# Patient Record
Sex: MALE | Race: WHITE | HISPANIC OR LATINO | Employment: PART TIME | URBAN - METROPOLITAN AREA
[De-identification: names, ages, dates, MRNs, and addresses within clinical notes are randomized per-mention and may not be internally consistent; named-entity substitution may affect disease eponyms.]

---

## 2017-10-11 ENCOUNTER — ALLSCRIPTS OFFICE VISIT (OUTPATIENT)
Dept: OTHER | Facility: OTHER | Age: 37
End: 2017-10-11

## 2017-10-11 ENCOUNTER — APPOINTMENT (OUTPATIENT)
Dept: LAB | Facility: CLINIC | Age: 37
End: 2017-10-11

## 2017-10-11 DIAGNOSIS — R10.13 EPIGASTRIC PAIN: ICD-10-CM

## 2017-10-11 DIAGNOSIS — R80.9 PROTEINURIA: ICD-10-CM

## 2017-10-11 DIAGNOSIS — E66.3 OVERWEIGHT: ICD-10-CM

## 2017-10-11 DIAGNOSIS — R39.9 UNSPECIFIED SYMPTOMS AND SIGNS INVOLVING THE GENITOURINARY SYSTEM: ICD-10-CM

## 2017-10-11 DIAGNOSIS — Z13.220 ENCOUNTER FOR SCREENING FOR LIPOID DISORDERS: ICD-10-CM

## 2017-10-11 DIAGNOSIS — R73.01 IMPAIRED FASTING GLUCOSE: ICD-10-CM

## 2017-10-11 LAB
ALBUMIN SERPL BCP-MCNC: 4.1 G/DL (ref 3.5–5)
ALP SERPL-CCNC: 101 U/L (ref 46–116)
ALT SERPL W P-5'-P-CCNC: 74 U/L (ref 12–78)
ANION GAP SERPL CALCULATED.3IONS-SCNC: 9 MMOL/L (ref 4–13)
AST SERPL W P-5'-P-CCNC: 42 U/L (ref 5–45)
BACTERIA UR QL AUTO: NORMAL /HPF
BILIRUB SERPL-MCNC: 0.94 MG/DL (ref 0.2–1)
BILIRUB UR QL STRIP: NEGATIVE
BUN SERPL-MCNC: 13 MG/DL (ref 5–25)
CALCIUM SERPL-MCNC: 9.1 MG/DL (ref 8.3–10.1)
CHLORIDE SERPL-SCNC: 106 MMOL/L (ref 100–108)
CHOLEST SERPL-MCNC: 157 MG/DL (ref 50–200)
CLARITY UR: ABNORMAL
CO2 SERPL-SCNC: 23 MMOL/L (ref 21–32)
COLOR UR: YELLOW
CREAT SERPL-MCNC: 0.73 MG/DL (ref 0.6–1.3)
GFR SERPL CREATININE-BSD FRML MDRD: 118 ML/MIN/1.73SQ M
GLUCOSE P FAST SERPL-MCNC: 111 MG/DL (ref 65–99)
GLUCOSE UR STRIP-MCNC: NEGATIVE MG/DL
HDLC SERPL-MCNC: 40 MG/DL (ref 40–60)
HGB UR QL STRIP.AUTO: NEGATIVE
HYALINE CASTS #/AREA URNS LPF: NORMAL /LPF
KETONES UR STRIP-MCNC: NEGATIVE MG/DL
LDLC SERPL CALC-MCNC: 88 MG/DL (ref 0–100)
LEUKOCYTE ESTERASE UR QL STRIP: NEGATIVE
NITRITE UR QL STRIP: NEGATIVE
NON-SQ EPI CELLS URNS QL MICRO: NORMAL /HPF
PH UR STRIP.AUTO: 6 [PH] (ref 4.5–8)
POTASSIUM SERPL-SCNC: 3.5 MMOL/L (ref 3.5–5.3)
PROT SERPL-MCNC: 8.8 G/DL (ref 6.4–8.2)
PROT UR STRIP-MCNC: ABNORMAL MG/DL
RBC #/AREA URNS AUTO: NORMAL /HPF
SODIUM SERPL-SCNC: 138 MMOL/L (ref 136–145)
SP GR UR STRIP.AUTO: 1.03 (ref 1–1.03)
T4 FREE SERPL-MCNC: 1.1 NG/DL (ref 0.76–1.46)
TRIGL SERPL-MCNC: 146 MG/DL
TSH SERPL DL<=0.05 MIU/L-ACNC: 4.5 UIU/ML (ref 0.36–3.74)
UROBILINOGEN UR QL STRIP.AUTO: 0.2 E.U./DL
WBC #/AREA URNS AUTO: NORMAL /HPF

## 2017-10-11 PROCEDURE — 80053 COMPREHEN METABOLIC PANEL: CPT

## 2017-10-11 PROCEDURE — 81001 URINALYSIS AUTO W/SCOPE: CPT

## 2017-10-11 PROCEDURE — 80061 LIPID PANEL: CPT

## 2017-10-11 PROCEDURE — 84439 ASSAY OF FREE THYROXINE: CPT

## 2017-10-11 PROCEDURE — 36415 COLL VENOUS BLD VENIPUNCTURE: CPT

## 2017-10-11 PROCEDURE — 84443 ASSAY THYROID STIM HORMONE: CPT

## 2017-10-13 NOTE — PROGRESS NOTES
Assessment  1  Pterygium of left eye (372 40) (H11 002)   2  Dyspepsia (536 8) (R10 13)   3  Symptoms involving urinary system (788 99) (R39 9)   4  Overweight (278 02) (E66 3)   5  Screening for lipoid disorders (V77 91) (Z13 220)   6  Acute bilateral low back pain without sciatica (724 2,338 19) (M54 5)   7  Decreased vision (369 9) (H54 7)    Plan   Dyspepsia    · RaNITidine HCl - 150 MG Oral Tablet; Take 1 tablet twice daily   · (1) COMPREHENSIVE METABOLIC PANEL; Status:Active; Requested for:11Oct2017; Overweight    · (1) TSH WITH FT4 REFLEX; Status:Active; Requested for:11Oct2017;   Pterygium of left eye    · Ophthalmology Referral Other Co-Management  *  Status: Hold For - Scheduling   Requested for: [id number]  are Referring to a non- Preferred Provider : Provider not listed in Allscripts  Care Summary provided  : Yes  Screening for lipoid disorders    · (1) LIPID PANEL FASTING W DIRECT LDL REFLEX; Status:Active; Requested  for:11Oct2017;   Symptoms involving urinary system    · (1) URINALYSIS w URINE C/S REFLEX (will reflex a microscopy if leukocytes, occult  blood, or nitrites are not within normal limits); Status:Active; Requested for:11Oct2017;     Flulaval Quadrivalent Intramuscular Suspension; INJECT 0 5  ML Intramuscular; To Be Done: [id number]; Status: Hold For - Administration, Retrospective By Protocol Authorization;  Ordered  For: Need for influenza vaccination; Ordered By:Leon Davenport; Effective Date:11Oct2017; Last Updated By: Grisel Garcia; 10/11/2017 11:50:39 AM     Discussion/Summary  Discussion Summary:   *Low back paintylenol prn, 650 mg PO  No NSAIDS (motrin/ibuprofen) until we r/o kidney disease  taking ranitidine 1-2 tabs PO BIDsymptoms improve   consider taking it 1 tablet daily at bedtimeto continue losing weight  visionto make appt with ophthalmologist  Counseling Documentation With Imm: The patient was counseled regarding instructions for [facility] factor reductions,-risks and benefits of treatment options,-importance of compliance with treatment  Patient Education: Educational resources provided:   Medication SE Review and Pt Understands Tx: Possible side effects of new medications were reviewed with the patient/guardian today  The treatment plan was reviewed with the patient/guardian  The patient/guardian understands and agrees with the treatment plan      Chief Complaint  Chief Complaint Free Text Note Form: Presents to the clinic to establish care      History of Present Illness  HPI: as aboveurinary symptoms including frequency, and discolored urine more like orange color  x 1 month  No dysuria, no hematuria  Occurred in the past about 5 months ago  Mother has a Hx of kidney disease and DM,  from complication from diabetes and kidney failure  [facility] Based Practices Required Assessment:   Pain Assessment   the patient states they have pain  The pain is located in the parveen back, epigastric  The patient describes the pain as aching and burning  (on a scale of 0 to 10, the patient rates the pain at 8 )       Review of Systems  Complete-Male:   Constitutional: No fever or chills, feels well, no tiredness, no recent weight gain or weight loss  Eyes: eyesight problems-and-decreased vision, more on the left side, but-as noted in HPI,-no eye pain,-eyes not red-and-no purulent discharge from the eyes  ENT: no complaints of earache, no hearing loss, no nosebleeds, no nasal discharge, no sore throat, no hoarseness  Cardiovascular: No complaints of slow heart rate, no fast heart rate, no chest pain, no palpitations, no leg claudication, no lower extremity  Respiratory: No complaints of shortness of breath, no wheezing, no cough, no SOB on exertion, no orthopnea or PND  Gastrointestinal: as noted in HPI,-no abdominal pain,-no nausea,-no vomiting,-no constipation,-no diarrhea-and-no blood in stools     Musculoskeletal: myalgias-and-low back pain, B/L, but-as noted in HPI,-no arthralgias,-no joint swelling-and-no joint stiffness  Integumentary: No complaints of skin rash or skin lesions, no itching, no skin wound, no dry skin  Psychiatric: Is not suicidal, no sleep disturbances, no anxiety or depression, no change in personality, no emotional problems  ROS Reviewed:   ROS reviewed  Active Problems  1  Pterygium of left eye (372 40) (H11 002)    Past Medical History  1  History of cardiomegaly (V12 59) (Z86 79)  Active Problems And Past Medical History Reviewed: The active problems and past medical history were reviewed and updated today  Surgical History  1  History of Eye Surgery  Surgical History Reviewed: The surgical history was reviewed and updated today  Family History  Mother    1  Family history of diabetes mellitus (V18 0) (Z83 3)   2  Family history of kidney disease (V18 69) (Z84 1)   3  Family history of Uterine cyst  Father    4  Family history unknown (V49 89) (Z78 9)  Family History    5  Denied: Family history of drug abuse   6  No family history of mental disorder  Family History Reviewed: The family history was reviewed and updated today  Social History   · Never a smoker  Social History Reviewed: The social history was reviewed and updated today  Current Meds   1  No Reported Medications Recorded    Allergies  1  No Known Drug Allergies    Vitals  Signs   Recorded: 50MYC6739 08:10AM   Temperature: 97 8 F  Heart Rate: 72  Systolic: 191  Diastolic: 82  Height: 5 ft 5 75 in  Weight: 183 lb   BMI Calculated: 29 76  BSA Calculated: 1 92    Physical Exam    Constitutional   General appearance: Abnormal   well developed,-appears healthy,-comfortable,-normal body odor,-does not smell of feces,-does not smell of urine,-well nourished,-overweight,-clothing appropriate-and-well groomed  Head and Face   Head and face: Normal     Palpation of the face and sinuses: No sinus tenderness      Eyes   Conjunctiva and lids: No erythema, swelling or discharge  Pupils and irises: Equal, round, reactive to light  Ears, Nose, Mouth, and Throat   External inspection of ears and nose: Normal     Otoscopic examination: Tympanic membranes translucent with normal light reflex  Canals patent without erythema  Nasal mucosa, septum, and turbinates: Normal without edema or erythema  Lips, teeth, and gums: Abnormal  -dental caries, some missing teeth  Oropharynx: Normal with no erythema, edema, exudate or lesions  Neck   Neck: Supple, symmetric, trachea midline, no masses  Thyroid: Normal, no thyromegaly  Pulmonary   Respiratory effort: No increased work of breathing or signs of respiratory distress  Auscultation of lungs: Clear to auscultation  Cardiovascular   Auscultation of heart: Normal rate and rhythm, normal S1 and S2, no murmurs  Pedal pulses: 2+ bilaterally  Abdomen   Abdomen: Abnormal  -mild tenderness on the epigatric area  Liver and spleen: No hepatomegaly or splenomegaly  Musculoskeletal   Gait and station: Normal     Inspection/palpation of joints, bones, and muscles: Normal     Range of motion: Normal     Muscle strength/tone: Normal     Psychiatric   Judgment and insight: Normal     Orientation to person, place and time: Normal     Recent and remote memory: Intact  Mood and affect: Normal        Results/Data  PHQ-2 Adult Depression Screening 11Oct2017 08:44AM User, Ahs     Test Name Result Flag Reference   PHQ-2 Adult Depression Score 0     Over the last two weeks, how often have you been bothered by any of the following problems? Little interest or pleasure in doing things: Not at all - 0  Feeling down, depressed, or hopeless: Not at all - 0   PHQ-2 Adult Depression Screening Negative         Attending Note  Collaborating Physician Note: Collaborating Physician: I supervised the Advanced Practitioner-and-I agree with the Advanced Practitioner note        Future Appointments    Date/Time Provider Specialty Site   10/18/2017 08:00 AM Lois Boyle 6 PCP     Signatures   Electronically signed by : JUVENTINO Shrestha; Oct 11 2017 11:47AM EST                       (Author)    Electronically signed by : Kaleb Rosa DO; Oct 11 2017 12:06PM EST                       (Review)

## 2017-10-18 ENCOUNTER — GENERIC CONVERSION - ENCOUNTER (OUTPATIENT)
Dept: OTHER | Facility: OTHER | Age: 37
End: 2017-10-18

## 2017-10-18 ENCOUNTER — APPOINTMENT (OUTPATIENT)
Dept: LAB | Facility: CLINIC | Age: 37
End: 2017-10-18

## 2017-10-18 DIAGNOSIS — R80.9 PROTEINURIA: ICD-10-CM

## 2017-10-18 DIAGNOSIS — R73.01 IMPAIRED FASTING GLUCOSE: ICD-10-CM

## 2017-10-18 LAB
EST. AVERAGE GLUCOSE BLD GHB EST-MCNC: 128 MG/DL
HBA1C MFR BLD: 6.1 % (ref 4.2–6.3)

## 2017-10-18 PROCEDURE — 86705 HEP B CORE ANTIBODY IGM: CPT

## 2017-10-18 PROCEDURE — 86038 ANTINUCLEAR ANTIBODIES: CPT

## 2017-10-18 PROCEDURE — 87340 HEPATITIS B SURFACE AG IA: CPT

## 2017-10-18 PROCEDURE — 83036 HEMOGLOBIN GLYCOSYLATED A1C: CPT

## 2017-10-18 PROCEDURE — 86704 HEP B CORE ANTIBODY TOTAL: CPT

## 2017-10-18 PROCEDURE — 86803 HEPATITIS C AB TEST: CPT

## 2017-10-18 PROCEDURE — 36415 COLL VENOUS BLD VENIPUNCTURE: CPT

## 2017-10-18 PROCEDURE — 87389 HIV-1 AG W/HIV-1&-2 AB AG IA: CPT

## 2017-10-19 LAB
HBV CORE AB SER QL: NORMAL
HBV CORE IGM SER QL: NORMAL
HBV SURFACE AG SER QL: NORMAL
HCV AB SER QL: NORMAL

## 2017-10-20 LAB
HIV 1+2 AB+HIV1 P24 AG SERPL QL IA: NORMAL
RYE IGE QN: NEGATIVE

## 2017-10-25 ENCOUNTER — HOSPITAL ENCOUNTER (OUTPATIENT)
Dept: RADIOLOGY | Facility: HOSPITAL | Age: 37
Discharge: HOME | End: 2017-10-25

## 2017-10-25 ENCOUNTER — TRANSCRIBE ORDERS (OUTPATIENT)
Dept: RADIOLOGY | Facility: HOSPITAL | Age: 37
End: 2017-10-25

## 2017-10-25 DIAGNOSIS — R80.9 PROTEINURIA: ICD-10-CM

## 2017-10-25 PROCEDURE — 74000 HB X-RAY EXAM OF ABDOMEN (SINGLE ANTEROPOSTERIOR VIEW): CPT

## 2017-11-01 ENCOUNTER — ALLSCRIPTS OFFICE VISIT (OUTPATIENT)
Dept: OTHER | Facility: OTHER | Age: 37
End: 2017-11-01

## 2017-11-01 LAB
BILIRUB UR QL STRIP: NEGATIVE
CLARITY UR: NORMAL
COLOR UR: YELLOW
GLUCOSE (HISTORICAL): NEGATIVE
HGB UR QL STRIP.AUTO: NORMAL
KETONES UR STRIP-MCNC: NEGATIVE MG/DL
LEUKOCYTE ESTERASE UR QL STRIP: NEGATIVE
NITRITE UR QL STRIP: NEGATIVE
PH UR STRIP.AUTO: 5 [PH]
PROT UR STRIP-MCNC: NORMAL MG/DL
SP GR UR STRIP.AUTO: 1.03
UROBILINOGEN UR QL STRIP.AUTO: 0.2

## 2017-11-08 ENCOUNTER — APPOINTMENT (OUTPATIENT)
Dept: LAB | Facility: CLINIC | Age: 37
End: 2017-11-08

## 2017-11-08 DIAGNOSIS — R80.9 PROTEINURIA: ICD-10-CM

## 2017-11-08 LAB
CREAT UR-MCNC: 285 MG/DL
MICROALBUMIN UR-MCNC: 311 MG/L (ref 0–20)
MICROALBUMIN/CREAT 24H UR: 109 MG/G CREATININE (ref 0–30)

## 2017-11-08 PROCEDURE — 82043 UR ALBUMIN QUANTITATIVE: CPT

## 2017-11-08 PROCEDURE — 82570 ASSAY OF URINE CREATININE: CPT

## 2018-01-13 VITALS
DIASTOLIC BLOOD PRESSURE: 82 MMHG | TEMPERATURE: 97.8 F | BODY MASS INDEX: 29.41 KG/M2 | HEIGHT: 66 IN | HEART RATE: 72 BPM | WEIGHT: 183 LBS | SYSTOLIC BLOOD PRESSURE: 118 MMHG

## 2018-01-22 VITALS
TEMPERATURE: 98 F | SYSTOLIC BLOOD PRESSURE: 122 MMHG | DIASTOLIC BLOOD PRESSURE: 84 MMHG | HEIGHT: 65 IN | HEART RATE: 62 BPM | BODY MASS INDEX: 31.66 KG/M2 | WEIGHT: 190.04 LBS

## 2018-01-22 VITALS
HEART RATE: 60 BPM | SYSTOLIC BLOOD PRESSURE: 96 MMHG | HEIGHT: 65 IN | BODY MASS INDEX: 31.44 KG/M2 | WEIGHT: 188.71 LBS | DIASTOLIC BLOOD PRESSURE: 80 MMHG | TEMPERATURE: 97.4 F

## 2018-02-27 RX ORDER — RANITIDINE 150 MG/1
1 TABLET ORAL 2 TIMES DAILY
COMMUNITY
Start: 2017-10-11 | End: 2018-02-28 | Stop reason: SDUPTHER

## 2018-02-28 ENCOUNTER — OFFICE VISIT (OUTPATIENT)
Dept: INTERNAL MEDICINE CLINIC | Facility: CLINIC | Age: 38
End: 2018-02-28

## 2018-02-28 VITALS
TEMPERATURE: 98.2 F | BODY MASS INDEX: 31.81 KG/M2 | HEART RATE: 74 BPM | DIASTOLIC BLOOD PRESSURE: 90 MMHG | SYSTOLIC BLOOD PRESSURE: 110 MMHG | HEIGHT: 65 IN | WEIGHT: 190.92 LBS

## 2018-02-28 DIAGNOSIS — R73.01 IMPAIRED FASTING GLUCOSE: ICD-10-CM

## 2018-02-28 DIAGNOSIS — R10.13 DYSPEPSIA: Primary | ICD-10-CM

## 2018-02-28 PROBLEM — H11.002 PTERYGIUM OF LEFT EYE: Status: ACTIVE | Noted: 2017-10-11

## 2018-02-28 PROBLEM — R80.9 PROTEINURIA: Status: ACTIVE | Noted: 2017-10-18

## 2018-02-28 PROBLEM — H54.7 DECREASED VISION: Status: ACTIVE | Noted: 2017-10-11

## 2018-02-28 PROBLEM — M54.50 ACUTE BILATERAL LOW BACK PAIN WITHOUT SCIATICA: Status: ACTIVE | Noted: 2017-10-11

## 2018-02-28 PROBLEM — E66.3 OVERWEIGHT: Status: ACTIVE | Noted: 2017-10-11

## 2018-02-28 PROBLEM — E03.8 SUBCLINICAL HYPOTHYROIDISM: Status: ACTIVE | Noted: 2017-10-12

## 2018-02-28 PROBLEM — R39.9 SYMPTOMS INVOLVING URINARY SYSTEM: Status: ACTIVE | Noted: 2017-10-11

## 2018-02-28 PROCEDURE — 99213 OFFICE O/P EST LOW 20 MIN: CPT | Performed by: NURSE PRACTITIONER

## 2018-02-28 RX ORDER — RANITIDINE 150 MG/1
150 TABLET ORAL 2 TIMES DAILY
Qty: 60 TABLET | Refills: 5 | Status: SHIPPED | OUTPATIENT
Start: 2018-02-28 | End: 2018-07-16 | Stop reason: SDUPTHER

## 2018-02-28 NOTE — PATIENT INSTRUCTIONS
Enfermedad por reflujo gastroesofágico   LO QUE NECESITA SABER:   La enfermedad por reflujo gastroesofágico ([place]) ocurre cuando el ácido y los alimentos en el estómago regresan al esófago  La enfermedad por reflujo gastroesofágico es el reflujo que se produce más de [address] a la semana alma varias semanas  Generalmente causa acidez y otros síntomas  La ERGE puede causar otros problemas de nathaniel con el tiempo si no es tratada  INSTRUCCIONES SOBRE EL [facility]NG HOSPITALARIA:   Regrese a la rosalva de emergencias si:   · Usted siente [place] y no puede eructar o vomitar  · Usted siente dolor estella en el pecho y dificultad repentina para respirar  · Imtiaz evacuaciones intestinales son de color rubio, con [place], o de apariencia alquitranada  · Bunch vómito parece paz café molido o contiene jasmine  Pregúntele a bunch [place] vitaminas y minerales son adecuados para usted  · Vomita grandes cantidades, o vomita con frecuencia  · Tiene dificultad para respirar después de vomitar  · Tiene dificultad para tragar o dolor al tragar  · Usted pierde peso sin proponérselo  · Imtiaz síntomas empeoran o no mejoran con el tratamiento  · Usted tiene preguntas o inquietudes acerca de bunch condición o cuidado  Medicamentos:   · Medicamentos,  se utilizan para disminuir el ácido [place] medicamentos también podrían usarse para ayudar a que bunch esfínter esofágico inferior y bunch estómago se contraigan (estrechen) más  · [place] imtiaz medicamentos paz se le haya indicado  Consulte con bunch médico si usted aye que bunch medicamento no le está ayudando o si presenta efectos secundarios  Infórmele si es alérgico a algún medicamento  Mantenga candido lista actualizada de los [place], las vitaminas y los productos herbales que jenae  Incluya los siguientes datos de los medicamentos: cantidad, frecuencia y motivo de administración  Traiga con usted la lista o los envases de la píldoras a imtiaz citas de seguimiento   Lleve la lista de los medicamentos con usted en susi de candido emergencia  Control de la ERGE:   · No consuma alimentos o bebidas que puedan aumentar la [place]  Estos incluyen chocolate, menta, comidas fritas o grasosas, bebidas que contienen cafeína o bebidas gaseosas  Otros alimentos incluyen comidas picantes, cebollas, tomates y alimentos a base de tomate  No consuma alimentos y bebidas que puedan irritar bunch esófago, paz las frutas cítricas, los jugos y las bebidas alcohólicas  · No ingiera comidas abundantes  Cuando usted come [employer] a la vez, bunch estómago necesita más ácido para digerirla  Consuma 6 comidas pequeñas al día en vez de 3 comidas grandes y coma lentamente  No consuma alimentos entre 2 y 3 horas antes de [place]  · Eleve la cabecera de bunch cama  Coloque bloques de 6 pulgadas debajo de la cabecera de la estructura de bunch cama  También podría usar más candido almohada para apoyar bunch christian y hombros mientras duerme  · Mantenga un peso saludable  Si usted tiene sobrepeso, la pérdida de peso podría ayudar a aliviar los síntomas de la [place]  · No fume  Fumar debilita el esfínter esofágico inferior y [place] el riesgo de [place]  Pida información a bunch médico si usted actualmente fuma y necesita ayuda para dejar de fumar  Los cigarrillos electrónicos o tabaco sin humo todavía contienen nicotina  Consulte con bunch médico antes de [employer]  · No use ropa que [place] alrededor de la cintura  La ropa apretada puede ejercer presión [employer] y causar o empeorar los síntomas de la [place]  Acuda a imtiaz consultas de control con bunch médico según le indicaron  Anote imtiaz preguntas para que se acuerde de hacerlas alma imtiaz visitas  © 2017 [address] Information is for End User's use only and may not be sold, redistributed or otherwise used for commercial purposes   All illustrations and images included in CareNotes® are the copyrighted property of [employer]  or Guy Dominguez  Esta información es sólo para uso en educación  Bunch intención no es darle un consejo médico sobre enfermedades o tratamientos  Colsulte con bunch Trudy Barclay farmacéutico antes de seguir cualquier régimen médico para saber si es seguro y efectivo para usted

## 2018-02-28 NOTE — PROGRESS NOTES
Assessment/Plan:     Diagnoses and all orders for this visit:    Dyspepsia       -     ranitidine (ZANTAC) 150 mg tablet; Take 1 tablet (150 mg total) by mouth 2 (two) times a day    Impaired fasting glucose  -     metFORMIN (GLUCOPHAGE) 500 mg tablet; Take 1 tablet (500 mg total) by mouth daily with breakfast          Subjective: Presents to the clinic for f/u abd pain     Patient ID: Lakshmi Castro is a 43 y o  male  HPI:  Patient was diagnosed with dyspepsia and started on ranitidine a few months ago  He reports the experienced about 80% improvement with taking the ranitidine 150 mg daily  He is made some changes to his diet, decreasing friend/fatty and processed foods  Also decreasing sugary drinks  Abd pain has completely stopped but he sometimes will still experience a burning sensation after eating  He was also diagnosed with prediabetes with an A1C of 6 1%, in October 2018  He reports taking the metformin 500 mg daily makes him to week  He is advised to take it every other day, instead of daily  Denies tremors, abnormal sweating or dizziness  Requesting refill of metformin  Abdominal Pain   Pertinent negatives include no constipation, diarrhea, frequency, headaches, hematuria, nausea or vomiting  The following portions of the patient's history were reviewed and updated as appropriate: allergies, current medications, past family history, past medical history, past social history, past surgical history and problem list     Review of Systems   Constitutional: Negative for appetite change, diaphoresis, fatigue and unexpected weight change  Respiratory: Negative for cough, chest tightness, shortness of breath and wheezing  Cardiovascular: Negative for chest pain and palpitations  Gastrointestinal: Positive for abdominal pain (burning pain after eating)  Negative for blood in stool, constipation, diarrhea, nausea and vomiting     Genitourinary: Negative for flank pain, frequency, hematuria and scrotal swelling  Neurological: Negative for dizziness, syncope, numbness and headaches  Objective:    /90 (BP Location: Right arm, Patient Position: Sitting, Cuff Size: Adult)   Pulse 74   Temp 98 2 °F (36 8 °C) (Oral)   Ht 5' 5" (1 651 m)   Wt 86 6 kg (190 lb 14 7 oz)   BMI 31 77 kg/m²        Physical Exam   Constitutional: He is oriented to person, place, and time  He appears well-developed and well-nourished  No distress  Cardiovascular: Normal rate, regular rhythm and normal heart sounds  No murmur heard  Pulmonary/Chest: Effort normal and breath sounds normal  No respiratory distress  He has no wheezes  Abdominal: Soft  Bowel sounds are normal  He exhibits no distension  There is no tenderness  There is no guarding  Neurological: He is alert and oriented to person, place, and time  Skin: Skin is warm and dry  He is not diaphoretic

## 2018-07-16 ENCOUNTER — TELEPHONE (OUTPATIENT)
Dept: INTERNAL MEDICINE CLINIC | Facility: CLINIC | Age: 38
End: 2018-07-16

## 2018-07-16 ENCOUNTER — APPOINTMENT (OUTPATIENT)
Dept: LAB | Facility: CLINIC | Age: 38
End: 2018-07-16

## 2018-07-16 ENCOUNTER — OFFICE VISIT (OUTPATIENT)
Dept: INTERNAL MEDICINE CLINIC | Facility: CLINIC | Age: 38
End: 2018-07-16

## 2018-07-16 VITALS
DIASTOLIC BLOOD PRESSURE: 88 MMHG | WEIGHT: 189.6 LBS | HEART RATE: 60 BPM | HEIGHT: 65 IN | TEMPERATURE: 97.8 F | BODY MASS INDEX: 31.59 KG/M2 | SYSTOLIC BLOOD PRESSURE: 130 MMHG

## 2018-07-16 DIAGNOSIS — H61.22 IMPACTED CERUMEN, LEFT EAR: ICD-10-CM

## 2018-07-16 DIAGNOSIS — R73.01 IMPAIRED FASTING GLUCOSE: ICD-10-CM

## 2018-07-16 DIAGNOSIS — R42 DIZZINESS: ICD-10-CM

## 2018-07-16 DIAGNOSIS — R80.9 PROTEINURIA, UNSPECIFIED TYPE: Primary | ICD-10-CM

## 2018-07-16 DIAGNOSIS — R74.8 ELEVATED LIVER ENZYMES: Primary | ICD-10-CM

## 2018-07-16 LAB
ALBUMIN SERPL BCP-MCNC: 4.1 G/DL (ref 3.5–5)
ALP SERPL-CCNC: 108 U/L (ref 46–116)
ALT SERPL W P-5'-P-CCNC: 126 U/L (ref 12–78)
ANION GAP SERPL CALCULATED.3IONS-SCNC: 9 MMOL/L (ref 4–13)
AST SERPL W P-5'-P-CCNC: 49 U/L (ref 5–45)
BILIRUB SERPL-MCNC: 0.53 MG/DL (ref 0.2–1)
BUN SERPL-MCNC: 12 MG/DL (ref 5–25)
CALCIUM SERPL-MCNC: 9 MG/DL (ref 8.3–10.1)
CHLORIDE SERPL-SCNC: 104 MMOL/L (ref 100–108)
CO2 SERPL-SCNC: 23 MMOL/L (ref 21–32)
CREAT SERPL-MCNC: 0.72 MG/DL (ref 0.6–1.3)
CREAT UR-MCNC: 263 MG/DL
ERYTHROCYTE [DISTWIDTH] IN BLOOD BY AUTOMATED COUNT: 12.3 % (ref 11.6–15.1)
EST. AVERAGE GLUCOSE BLD GHB EST-MCNC: 154 MG/DL
GFR SERPL CREATININE-BSD FRML MDRD: 118 ML/MIN/1.73SQ M
GLUCOSE P FAST SERPL-MCNC: 137 MG/DL (ref 65–99)
HBA1C MFR BLD: 7 % (ref 4.2–6.3)
HCT VFR BLD AUTO: 42.7 % (ref 36.5–49.3)
HGB BLD-MCNC: 14.3 G/DL (ref 12–17)
MCH RBC QN AUTO: 30.1 PG (ref 26.8–34.3)
MCHC RBC AUTO-ENTMCNC: 33.5 G/DL (ref 31.4–37.4)
MCV RBC AUTO: 90 FL (ref 82–98)
MICROALBUMIN UR-MCNC: 292 MG/L (ref 0–20)
MICROALBUMIN/CREAT 24H UR: 111 MG/G CREATININE (ref 0–30)
PLATELET # BLD AUTO: 271 THOUSANDS/UL (ref 149–390)
PMV BLD AUTO: 12.1 FL (ref 8.9–12.7)
POTASSIUM SERPL-SCNC: 3.7 MMOL/L (ref 3.5–5.3)
PROT SERPL-MCNC: 8.2 G/DL (ref 6.4–8.2)
RBC # BLD AUTO: 4.75 MILLION/UL (ref 3.88–5.62)
SODIUM SERPL-SCNC: 136 MMOL/L (ref 136–145)
WBC # BLD AUTO: 9.18 THOUSAND/UL (ref 4.31–10.16)

## 2018-07-16 PROCEDURE — 82043 UR ALBUMIN QUANTITATIVE: CPT | Performed by: NURSE PRACTITIONER

## 2018-07-16 PROCEDURE — 99214 OFFICE O/P EST MOD 30 MIN: CPT | Performed by: NURSE PRACTITIONER

## 2018-07-16 PROCEDURE — 83036 HEMOGLOBIN GLYCOSYLATED A1C: CPT

## 2018-07-16 PROCEDURE — 85027 COMPLETE CBC AUTOMATED: CPT

## 2018-07-16 PROCEDURE — 69209 REMOVE IMPACTED EAR WAX UNI: CPT | Performed by: NURSE PRACTITIONER

## 2018-07-16 PROCEDURE — 82570 ASSAY OF URINE CREATININE: CPT | Performed by: NURSE PRACTITIONER

## 2018-07-16 PROCEDURE — 36415 COLL VENOUS BLD VENIPUNCTURE: CPT

## 2018-07-16 PROCEDURE — 80053 COMPREHEN METABOLIC PANEL: CPT

## 2018-07-16 RX ORDER — RANITIDINE 150 MG/1
150 TABLET ORAL 2 TIMES DAILY
Qty: 60 TABLET | Refills: 0 | Status: SHIPPED | OUTPATIENT
Start: 2018-07-16 | End: 2018-09-18 | Stop reason: SDUPTHER

## 2018-07-16 NOTE — PROGRESS NOTES
Assessment/Plan:     Diagnoses and all orders for this visit:    Proteinuria, unspecified type  -     Microalbumin / creatinine urine ratio    Impaired fasting glucose  -     ranitidine (ZANTAC) 150 mg tablet; Take 1 tablet (150 mg total) by mouth 2 (two) times a day  -     metFORMIN (GLUCOPHAGE) 500 mg tablet; Take 1 tablet (500 mg total) by mouth daily with breakfast  -     CBC; Future  -     Comprehensive metabolic panel; Future    Dizziness  -     Hemoglobin A1C; Future    Impacted cerumen  -     Ear cerumen removal        Subjective: Patient presents to the clinic for f/u prediabetes     Patient ID: Tori Bryant is a 43 y o  male  HPI  He was diagnosed with prediabetes about 10 months ago with an A1C of 6 1%  Was started on metformin 500 mg daily  Also has diagnosis of dyspepsia and took ranitidine for a few months, accompanied with changes in his diet  He reports in the last few weeks the symptoms have returned, with burning pain in the upper abdomen after eating  No N/V/D  No CP or SOB  Does report intermittent dizziness, and rare mild headaches  He had previously been diagnoses with proteinuria  Will recheck now that his A1C has likely improved  We will treat based on results  The following portions of the patient's history were reviewed and updated as appropriate: allergies, current medications, past family history, past medical history, past social history, past surgical history and problem list     Review of Systems   Constitutional: Negative for appetite change, chills, fatigue and fever  HENT: Negative for congestion, ear pain, postnasal drip, sinus pressure, sore throat and trouble swallowing  Respiratory: Negative for cough, chest tightness, shortness of breath and wheezing  Cardiovascular: Negative for chest pain and palpitations  Gastrointestinal: Positive for abdominal pain (as above)  Negative for constipation, diarrhea, nausea and vomiting     Neurological: Positive for dizziness (as above) and headaches (as above  none at this time  when they occur are achy, mild, 2/10, generalized, gets better with OTC med  lasting less than 1 hr, no aura, no sensivity to light or noise  )  Negative for seizures, syncope, weakness and numbness  Objective:      /88 (BP Location: Left arm, Patient Position: Sitting, Cuff Size: Adult)   Pulse 60   Temp 97 8 °F (36 6 °C) (Oral)   Ht 5' 5" (1 651 m)   Wt 86 kg (189 lb 9 5 oz)   BMI 31 55 kg/m²            Physical Exam   Constitutional: He appears well-developed and well-nourished  No distress  HENT:   Head: Normocephalic and atraumatic  Right Ear: External ear normal    Nose: Nose normal    The left ear has impacted cerumen  R ear WNL  Eyes: Conjunctivae and EOM are normal  Pupils are equal, round, and reactive to light  Right eye exhibits no discharge  Left eye exhibits no discharge  Cardiovascular: Normal rate, regular rhythm and normal heart sounds  No murmur heard  Pulmonary/Chest: Effort normal and breath sounds normal  No respiratory distress  He has no wheezes  Abdominal: Soft  Bowel sounds are normal  He exhibits no distension  There is tenderness (mild TTP of the epigastric area  )  Skin: He is not diaphoretic  Psychiatric: He has a normal mood and affect  His behavior is normal  Judgment and thought content normal        Ear cerumen removal  Date/Time: 7/16/2018 8:34 AM  Performed by: Diamante Morgan by: Maxi Jones     Patient location:  Clinic  Consent:     Consent obtained:  Verbal    Consent given by:  Patient    Risks discussed:  Bleeding, infection, pain, incomplete removal, TM perforation and dizziness    Alternatives discussed:  No treatment  Procedure details:     Location:  L ear    Procedure type: irrigation    Post-procedure details:     Complication:  None    Hearing quality:  Normal    Patient tolerance of procedure:   Tolerated well, no immediate complications  Comments: Complete removal of cerumen in the left ear   Advised to avoid Q tips

## 2018-07-17 ENCOUNTER — TELEPHONE (OUTPATIENT)
Dept: INTERNAL MEDICINE CLINIC | Facility: CLINIC | Age: 38
End: 2018-07-17

## 2018-07-17 DIAGNOSIS — E08.21 DIABETES MELLITUS DUE TO UNDERLYING CONDITION WITH DIABETIC NEPHROPATHY, WITHOUT LONG-TERM CURRENT USE OF INSULIN (HCC): ICD-10-CM

## 2018-07-17 DIAGNOSIS — E11.9 TYPE 2 DIABETES MELLITUS WITHOUT COMPLICATION, WITHOUT LONG-TERM CURRENT USE OF INSULIN (HCC): Primary | ICD-10-CM

## 2018-07-17 PROBLEM — R73.01 IMPAIRED FASTING GLUCOSE: Status: RESOLVED | Noted: 2018-07-16 | Resolved: 2018-07-17

## 2018-07-17 RX ORDER — LISINOPRIL 2.5 MG/1
2.5 TABLET ORAL DAILY
Qty: 30 TABLET | Refills: 5 | Status: SHIPPED | OUTPATIENT
Start: 2018-07-17 | End: 2018-09-18 | Stop reason: SDUPTHER

## 2018-07-17 NOTE — TELEPHONE ENCOUNTER
FIRST ATTEMPT TO CONTACT PT BUT THERE WAS NO ANSWER SO VM WAS LEFT TO PT IN [place] TO CALL US BACK

## 2018-07-18 NOTE — TELEPHONE ENCOUNTER
PATIENT MADE AWARE OF ELEVATED LIVER ENZYMES AND TO COMPLETE LAB TEST FOR HEPATITIS  PT STATED HE WILL COMPLETE IT TOMORROW

## 2018-07-19 ENCOUNTER — APPOINTMENT (OUTPATIENT)
Dept: LAB | Facility: CLINIC | Age: 38
End: 2018-07-19

## 2018-07-19 DIAGNOSIS — R74.8 ELEVATED LIVER ENZYMES: ICD-10-CM

## 2018-07-19 PROCEDURE — 86704 HEP B CORE ANTIBODY TOTAL: CPT

## 2018-07-19 PROCEDURE — 86705 HEP B CORE ANTIBODY IGM: CPT

## 2018-07-19 PROCEDURE — 87340 HEPATITIS B SURFACE AG IA: CPT

## 2018-07-19 PROCEDURE — 36415 COLL VENOUS BLD VENIPUNCTURE: CPT

## 2018-07-19 PROCEDURE — 86803 HEPATITIS C AB TEST: CPT

## 2018-07-23 ENCOUNTER — TELEPHONE (OUTPATIENT)
Dept: INTERNAL MEDICINE CLINIC | Facility: CLINIC | Age: 38
End: 2018-07-23

## 2018-09-18 ENCOUNTER — OFFICE VISIT (OUTPATIENT)
Dept: INTERNAL MEDICINE CLINIC | Facility: CLINIC | Age: 38
End: 2018-09-18

## 2018-09-18 VITALS
BODY MASS INDEX: 31.37 KG/M2 | SYSTOLIC BLOOD PRESSURE: 116 MMHG | TEMPERATURE: 97.7 F | HEIGHT: 65 IN | HEART RATE: 64 BPM | DIASTOLIC BLOOD PRESSURE: 80 MMHG | WEIGHT: 188.27 LBS

## 2018-09-18 DIAGNOSIS — E08.21 DIABETES MELLITUS DUE TO UNDERLYING CONDITION WITH DIABETIC NEPHROPATHY, WITHOUT LONG-TERM CURRENT USE OF INSULIN (HCC): ICD-10-CM

## 2018-09-18 DIAGNOSIS — R10.13 DYSPEPSIA: Primary | ICD-10-CM

## 2018-09-18 DIAGNOSIS — E11.9 TYPE 2 DIABETES MELLITUS WITHOUT COMPLICATION, WITHOUT LONG-TERM CURRENT USE OF INSULIN (HCC): ICD-10-CM

## 2018-09-18 PROCEDURE — 99213 OFFICE O/P EST LOW 20 MIN: CPT | Performed by: NURSE PRACTITIONER

## 2018-09-18 RX ORDER — RANITIDINE 150 MG/1
150 TABLET ORAL 2 TIMES DAILY
Qty: 60 TABLET | Refills: 0 | Status: SHIPPED | OUTPATIENT
Start: 2018-09-18 | End: 2019-03-20

## 2018-09-18 RX ORDER — LISINOPRIL 2.5 MG/1
2.5 TABLET ORAL DAILY
Qty: 30 TABLET | Refills: 5 | Status: SHIPPED | OUTPATIENT
Start: 2018-09-18 | End: 2019-03-20 | Stop reason: SDUPTHER

## 2018-09-18 RX ORDER — RANITIDINE 150 MG/1
150 TABLET ORAL 2 TIMES DAILY
Qty: 60 TABLET | Refills: 5 | Status: SHIPPED | OUTPATIENT
Start: 2018-09-18 | End: 2018-09-18 | Stop reason: SDUPTHER

## 2018-09-18 NOTE — PROGRESS NOTES
Assessment/Plan:     Diagnoses and all orders for this visit:    Dyspepsia  -     ranitidine (ZANTAC) 150 mg tablet; Take 1 tablet (150 mg total) by mouth 2 (two) times a day    Type 2 diabetes mellitus without complication, without long-term current use of insulin (HCC)  -     metFORMIN (GLUCOPHAGE) 1000 MG tablet; Take 0 5 tablets (500 mg total) by mouth 2 (two) times a day with meals    Diabetes mellitus due to underlying condition with diabetic nephropathy, without long-term current use of insulin (HCC)  -     lisinopril (ZESTRIL) 2 5 mg tablet; Take 1 tablet (2 5 mg total) by mouth daily            Subjective: Patient presents to clinic for follow-up diabetes     Patient ID: Wei Denson is a 43 y o  male  HPI   patient is newly diagnosed diabetes mellitus type 2 and was started on metformin 500 mg 2 times a day  He reports that his serum glucose at Home runs from 140 mg/dL and higher  Denies any side effects from metformin  He is advised that we are going to increase his metformin from 500 mg 2 times a day to 1000 mg 2 times a day  He denies any chest pain, shortness of breath, abdominal pain, weakness, change in vision, or change in urination  Requesting refill for ranitidine 150 mg twice daily  Re-sent to the pharmacy with 5 refills  He has not been taking lisinopril 2 5 mg for protein in his urine  He is advised to  the medicine from the pharmacy and take 1 tablet daily  His liver enzymes were elevated  Test for hepatitis came back normal   He reports he takes Tylenol as needed for intermittent pain, and he is advised to stop using Tylenol  He drinks occasionally and he is advised to stop that too  Will monitor his liver enzymes      The following portions of the patient's history were reviewed and updated as appropriate: allergies, current medications, past family history, past medical history, past social history, past surgical history and problem list     Review of Systems   Constitutional: Negative for appetite change, chills, fatigue and fever  Respiratory: Negative for cough, chest tightness, shortness of breath and wheezing  Cardiovascular: Negative for chest pain and palpitations  Gastrointestinal: Negative for abdominal pain, constipation, diarrhea and nausea  Endocrine: Negative for cold intolerance, heat intolerance, polydipsia, polyphagia and polyuria  Genitourinary: Negative for dysuria, frequency, hematuria and urgency  Objective:      /80 (BP Location: Left arm, Patient Position: Sitting, Cuff Size: Standard)   Pulse 64   Temp 97 7 °F (36 5 °C) (Oral)   Ht 5' 5" (1 651 m)   Wt 85 4 kg (188 lb 4 4 oz)   BMI 31 33 kg/m²        Physical Exam   Constitutional: He appears well-developed and well-nourished  No distress  BMI 31 33 kg/m2   Cardiovascular: Normal rate, regular rhythm and normal heart sounds  No murmur heard  Pulmonary/Chest: Effort normal and breath sounds normal  No respiratory distress  He has no wheezes  Skin: He is not diaphoretic  Psychiatric: He has a normal mood and affect  His behavior is normal  Judgment and thought content normal    Vitals reviewed

## 2018-09-18 NOTE — PATIENT INSTRUCTIONS
La diabetes y bunch piel   CUIDADO AMBULATORIO:   Diabetes  puede afectar cualquier parte de bunch cuerpo, incluyendo bunch piel  Cuando la diabetes no está bajo control, los vasos sanguíneos y los nervios pueden dañarse  Si se dañan los vasos sanguíneos, es posible que la jasmine no circule saleem a los tejidos y [place]  Cuando la jasmine no circula saleem a la piel, pueden formarse llagas que no se curan con facilidad  [facility] se bruce en la piel también se conocen paz [place]  Las personas diabéticas tienden asimismo a tener más infecciones bacteriales que los demás  Si el nivel de azúcar en la jasmine está bajo control, es posible prevenir la mayor parte de las condiciones de la piel  Las llagas de la piel pueden tardar en sanar o pueden poner en peligro bunch idania o imtiaz extremidades si no recibe tratamiento de forma oportuna  Pregúntele a bunch Severiano Nones vitaminas y minerales son adecuados para usted  · Usted sufre candido quemadura o herida grave  · Usted tiene la piel enrojecida alrededor de candido llaga, la llaga le duele o está caliente al tacto  · Bunch llaga no mejora o parece empeorar  · Usted tiene fiebre  · Bunch nivel de azúcar en la jasmine continúa siendo más alto que lo recomendado  Cambios normales en la piel:  · La piel de bunch sabina, nyla, rachell o axilas se pone más gruesa y [place], yasmeen permanece [place]  · Piel seca y escamosa    · Usted tiene pólipos o excrecencias cutáneas en los párpados, el sabina o las [place]  · [employer] de imtiaz darwin, la planta de imtiaz pies y imtiaz uñas se ponen de color amarillento  · Usted tiene parches oscuros en las espinillas de imtiaz piernas  · Usted tiene el rachell y el sabina de color huerta en forma jeff  · Engrosamiento de la piel al dorso de imtiaz darwin o la parte superior de imtiaz pies  Evite la formación de llagas en la piel:   · Mantenga el azúcar en la jasmine dentro del nivel recomendado    Bunch médico le indicará cuál debe ser bunch nivel de azúcar en la jasmine  Usted corre mayor riesgo de contraer infecciones en la piel y que imtiaz heridas no sanen si el nivel de azúcar en alonso jasmine es alto  · Mantenga alonso piel limpia  No tome ravin ni duchas de Native  Alonso piel podría resecarse  No tome ravin de espuma si tiene la piel seca  Use un jabón humectante y póngase crema en la piel después del baño o Bran Font[place]  No se ponga crema humectante entre los dedos de [employer]  · Evite que alonos piel se reseque demasiado,  especialmente cuando hace frío y en climas secos  La piel seca puede abrirse y merle lugar a candido infección cuando usted se rasca para aliviar la comezón  Báñese con usha frecuencia cuando shyanne frío y póngase candido crema humectante  Use un humidificador de ambiente en alonso hogar  · Mantenga secas las áreas donde roza piel con piel  Use polvo talco en las partes de alonso cuerpo paz las axilas o la nyla  También podría necesitarlo debajo de los senos y [place] dedos de los pies  Usted podría contraer candido infección fúngica en estas partes de alonso cuerpo si permanecen húmedas  · Trate las heridas de inmediato  [address] [place] [address] heridas pequeñas con agua y Nicolas Millan  Cúbralas con candido gasa esterilizada  © 2017 [address] [employer] Information is for End User's use only and may not be sold, redistributed or otherwise used for commercial purposes  All illustrations and images included in CareNotes® are the copyrighted property of A D A M , Inc  or Guy Mix  Esta información es sólo para uso en educación  Alonso intención no es darle un consejo médico sobre enfermedades o tratamientos  Colsulte con alonso [place] farmacéutico antes de seguir cualquier régimen médico para saber si es seguro y efectivo para usted

## 2019-03-20 ENCOUNTER — OFFICE VISIT (OUTPATIENT)
Dept: INTERNAL MEDICINE CLINIC | Facility: CLINIC | Age: 39
End: 2019-03-20

## 2019-03-20 VITALS
HEART RATE: 60 BPM | HEIGHT: 65 IN | SYSTOLIC BLOOD PRESSURE: 100 MMHG | BODY MASS INDEX: 31.59 KG/M2 | TEMPERATURE: 97.9 F | WEIGHT: 189.6 LBS | DIASTOLIC BLOOD PRESSURE: 78 MMHG

## 2019-03-20 DIAGNOSIS — E08.21 DIABETES MELLITUS DUE TO UNDERLYING CONDITION WITH DIABETIC NEPHROPATHY, WITHOUT LONG-TERM CURRENT USE OF INSULIN (HCC): ICD-10-CM

## 2019-03-20 DIAGNOSIS — R10.13 DYSPEPSIA: ICD-10-CM

## 2019-03-20 DIAGNOSIS — E11.9 TYPE 2 DIABETES MELLITUS WITHOUT COMPLICATION, WITHOUT LONG-TERM CURRENT USE OF INSULIN (HCC): Primary | ICD-10-CM

## 2019-03-20 LAB — SL AMB POCT HEMOGLOBIN AIC: 7.1 (ref ?–6.5)

## 2019-03-20 PROCEDURE — 99213 OFFICE O/P EST LOW 20 MIN: CPT | Performed by: INTERNAL MEDICINE

## 2019-03-20 PROCEDURE — 83036 HEMOGLOBIN GLYCOSYLATED A1C: CPT | Performed by: INTERNAL MEDICINE

## 2019-03-20 RX ORDER — RANITIDINE 150 MG/1
150 TABLET ORAL 2 TIMES DAILY
Qty: 60 TABLET | Refills: 0 | Status: SHIPPED | OUTPATIENT
Start: 2019-03-20 | End: 2019-06-05

## 2019-03-20 RX ORDER — LISINOPRIL 2.5 MG/1
2.5 TABLET ORAL DAILY
Qty: 30 TABLET | Refills: 3 | Status: SHIPPED | OUTPATIENT
Start: 2019-03-20 | End: 2019-06-05 | Stop reason: SDUPTHER

## 2019-03-20 NOTE — PATIENT INSTRUCTIONS
Diabetes in the Older Adult   WHAT YOU NEED TO KNOW:   Older adults with diabetes are at risk for heart disease, stroke, kidney disease, blindness, and nerve damage  You may also be at risk for any of the following:  · Poor nutrition or low blood sugar levels    · Confusion or problems with memory, attention, or learning new things    · Trouble controlling urination or frequent urinary tract infections    · Trouble with coordination or balance    · Falls and injuries    · Pain    · Depression    · Open sores on your legs or feet  DISCHARGE INSTRUCTIONS:   Call 911 for any of the following:   · You have any of the following signs of a stroke:      ¨ Numbness or drooping on one side of your face     ¨ Weakness in an arm or leg    ¨ Confusion or difficulty speaking    ¨ Dizziness, a severe headache, or vision loss    · You have any of the following signs of a heart attack:      ¨ Squeezing, pressure, or pain in your chest that lasts longer than 5 minutes or returns    ¨ Discomfort or pain in your back, neck, jaw, stomach, or arm     ¨ Trouble breathing    ¨ Nausea or vomiting    ¨ Lightheadedness or a sudden cold sweat, especially with chest pain or trouble breathing  Return to the emergency department if:   · You have severe abdominal pain, or the pain spreads to your back  You may also be vomiting  · You have trouble staying awake or focusing  · You are shaking or sweating  · You have blurred or double vision  · Your breath has a fruity, sweet smell  · Your breathing is deep and labored, or rapid and shallow  · Your heartbeat is fast and weak  · You fall and get hurt  Contact your healthcare provider if:   · You are vomiting or have diarrhea  · You have an upset stomach and cannot eat the foods on your meal plan  · You feel weak or more tired than usual      · You feel dizzy, have headaches, or are easily irritated  · Your skin is red, warm, dry, or swollen       · You have a wound that does not heal      · You have numbness in your arms or legs  · You have trouble coping with your illness, or you feel anxious or depressed  · You have problems with your memory  · You have changes in your vision  · You have questions or concerns about your condition or care  Medicines  may be given to decrease the amount of sugar in your blood  You may also need medicine to lower your blood pressure or cholesterol, or medicine to prevent blood clots  Manage the ABCs and prevent problems caused by diabetes:   · Check your blood sugar levels as directed  Your healthcare provider will tell you when and how often to check during the day  Your healthcare provider will also tell you what your blood sugar levels should be before and after a meal  You may need to check for ketones in your urine or blood if your level is higher than directed  Write down your results and show them to your healthcare provider  Your provider may use the results to make changes to your medicine, food, or exercise schedules  Ask your healthcare provider for more information about how to treat a high or low blood sugar level  · Follow your meal plan as directed  A dietitian will help you make a meal plan to keep your blood sugar level steady and make sure you get enough nutrition  Do not skip meals  Your blood sugar level may drop too low if you have taken diabetes medicine and do not eat  Ask your healthcare provider about programs in your community that can deliver the meals to your home  · Try to be active for 30 to 60 minutes most days of the week  Exercise can help keep your blood sugar level steady, decrease your risk of heart disease, and help you lose weight  It can also help improve your balance and decrease your risk for falls  Work with your healthcare provider to create an exercise plan  Ask a family member or friend to exercise with you   Start slow and exercise for 5 to 10 minutes at a time  Examples of activities include walking or swimming  Include muscle strengthening activities 2 to 3 days each week  Include balance training 2 to 3 times each week  Activities that help increase balance include yoga and jian chi      · Maintain a healthy weight  Ask your healthcare provider how much you should weigh  A healthy weight can help you control your diabetes and prevent heart disease  Ask your provider to help you create a weight loss plan if you are overweight  Together you can set manageable weight loss goals  · Do not smoke  Ask your healthcare provider for information if you currently smoke and need help to quit  Do not use e-cigarettes or smokeless tobacco in place of cigarettes or to help you quit  They still contain nicotine  · Manage stress  Stress may increase your blood sugar level  Deep breathing, muscle relaxation, and music may help you relax  Ask your healthcare provider for more information about these practices  Other ways to manage your diabetes:   · Check your feet every day for sores  Look at your whole foot, including the bottom, and between and under your toes  Check for wounds, corns, and calluses  Use a mirror to see the bottom of your feet  The skin on your feet may be shiny, tight, dry, or darker than normal  Your feet may also be cold and pale  Feel your feet by running your hands along the tops, bottoms, sides, and between your toes  Redness, swelling, and warmth are signs of blood flow problems that can lead to a foot ulcer  Do not try to remove corns or calluses yourself  · Wear medical alert identification  Wear medical alert jewelry or carry a card that says you have diabetes  Ask your healthcare provider where to get these items  · Ask about vaccines  You have a higher risk for serious illness if you get the flu, pneumonia, or hepatitis   Ask your healthcare provider if you should get a flu, pneumonia, shingles, or hepatitis B vaccine, and when to get the vaccine  · Keep all appointments  You may need to return to have your A1c checked every 3 months  You will need to return at least once each year to have your feet checked  You will need an eye exam once a year to check for retinopathy  You will also need urine tests every year to check for kidney problems  You may need tests to monitor for heart disease  Write down your questions so you remember to ask them during your visits  · Get help from family and friends  You may need help checking your blood sugar level, giving insulin injections, or preparing your meals  Ask your family and friends to help you with these tasks  Talk to your healthcare provider if you do not have someone at home to help you  A healthcare provider can come to your home to help you with these tasks  Follow up with your healthcare provider as directed: You may need to return to have your A1c checked every 3 months  You will need to return at least once each year to have your feet checked  You will need an eye exam once a year to check for retinopathy  You will also need urine tests every year to check for kidney problems  You may need tests to monitor for heart disease  Write down your questions so you remember to ask them during your visits  © 2017 [address] Kevin Zelaya Information is for End User's use only and may not be sold, redistributed or otherwise used for commercial purposes  All illustrations and images included in CareNotes® are the copyrighted property of A D A M , Inc  or Guy Mix  The above information is an  only  It is not intended as medical advice for individual conditions or treatments  Talk to your doctor, nurse or pharmacist before following any medical regimen to see if it is safe and effective for you

## 2019-03-20 NOTE — PROGRESS NOTES
INTERNAL MEDICINE FOLLOW-UP OFFICE VISIT  [facility]  [address] [place] [address]    NAME: Ja Jones  AGE: 35 y o  SEX: male    DATE OF ENCOUNTER: 3/20/2019    Assessment and Plan     Health Maintenance:  -Patient provided with contact information for mirasol to help him obtain insurance  -Unable to vaccinate at this time secondary to insurance status  DM2:  -A1C and Foot exam complete  -Will consider adding Glimiperide 2mg pending results of patients A1C  -Will complete DM eye/microalbumin pending insurance   -Advised patient to take his prescribed Lisinopril    -Advised patient to adopt a healthy diet and exercise routine    GERD: Patient screens positive for depression, though when asked further about this describes symptoms of GERD  Has been on Ranitidine in the past, which he reports treated these symptoms well  I suspect that patient is misunderstanding the questions asked during depression screening    -Without red flag symptoms of gerd  -Will represcribe Ranitidine  -Will follow up on patients mental health at future visits  -Patient denies SI/HI  Orders Placed This Encounter   Procedures    POCT hemoglobin A1c           Chief Complaint     Chief Complaint   Patient presents with    Follow-up    Heartburn    Foot Pain     "bilateral"    Diabetes    Medication Refill       History of Present Illness     HPI    35year old male with PMH DM2  Patient presents for follow up of his diabetes  Patient reports that he checks his BG bid before breakfast and after supper  BGs run 160-240  His typical diet consists of grilled chicken and veggies  He exercises occasionally  Has not had an episode of hypoglycemia in several months  Patient screened positive for depression this morning  When asked about what is going on to make him feel this way, patient describes symptoms of GERD   He has been successfully treated with Ranitidine in the past for these symptoms  He denies red flag symptoms for gerd  Patient reports good compliance with his Metformin without side effects  He reports that he is not taking his Lisinopril, unable to give me a reason why  I discussed with patient the role of lisinopril in his disease and advised him to take his prescribed medicines  The following portions of the patient's history were reviewed and updated as appropriate: allergies, current medications, past family history, past medical history, past social history, past surgical history and problem list     Review of Systems     Review of Systems    Active Problem List     Patient Active Problem List   Diagnosis    Acute bilateral low back pain without sciatica    Decreased vision    Dyspepsia    Impaired fasting blood sugar    Overweight    Proteinuria    Pterygium of left eye    Subclinical hypothyroidism    Symptoms involving urinary system    Dizziness    Type 2 diabetes mellitus without complication, without long-term current use of insulin ([facility]    Diabetes mellitus due to underlying condition with diabetic nephropathy, without long-term current use of insulin ([facility])       Objective     /78 (BP Location: Right arm, Patient Position: Sitting, Cuff Size: Adult)   Pulse 60   Temp 97 9 °F (36 6 °C) (Oral)   Ht 5' 5" (1 651 m)   Wt 86 kg (189 lb 9 5 oz)   BMI 31 55 kg/m²        Physical Exam   Cardiovascular: Pulses are no weak pulses  Pulses:       Dorsalis pedis pulses are 2+ on the right side, and 2+ on the left side  Posterior tibial pulses are 2+ on the right side, and 2+ on the left side  Musculoskeletal:        Feet:    Feet:   Right Foot:   Skin Integrity: Positive for dry skin  Negative for ulcer, skin breakdown, erythema, warmth or callus  Left Foot:   Skin Integrity: Positive for dry skin  Negative for ulcer, skin breakdown, erythema, warmth or callus         Pertinent Laboratory/Diagnostic Studies:  CBC:   Lab Results   Component Value Date/Time    WBC 9 18 07/16/2018 08:51 AM    RBC 4 75 07/16/2018 08:51 AM    HGB 14 3 07/16/2018 08:51 AM    HCT 42 7 07/16/2018 08:51 AM    MCV 90 07/16/2018 08:51 AM    MCH 30 1 07/16/2018 08:51 AM    MCHC 33 5 07/16/2018 08:51 AM    RDW 12 3 07/16/2018 08:51 AM    MPV 12 1 07/16/2018 08:51 AM     07/16/2018 08:51 AM     Chemistry Profile:   Lab Results   Component Value Date/Time    K 3 7 07/16/2018 08:51 AM     07/16/2018 08:51 AM    CO2 23 07/16/2018 08:51 AM    BUN 12 07/16/2018 08:51 AM    CREATININE 0 72 07/16/2018 08:51 AM    GLUF 137 (H) 07/16/2018 08:51 AM    CALCIUM 9 0 07/16/2018 08:51 AM    AST 49 (H) 07/16/2018 08:51 AM     (H) 07/16/2018 08:51 AM    ALKPHOS 108 07/16/2018 08:51 AM    EGFR 118 07/16/2018 08:51 AM     CBC:     Chemistry Profile:       Invalid input(s): EXTSODIUM, EXTPOTASSIUM, EXTCO2, EXTANIONGAP, EXTBUN, EXTCREAT, EXTGLUBLD, GLU, SLAMBGLUCOSE, EXTCALCIUM, CAADJUST, ALBUMIN, SERUMALBUMIN, EXTEGFR    Current Medications     Current Outpatient Medications:     metFORMIN (GLUCOPHAGE) 1000 MG tablet, Take 0 5 tablets (500 mg total) by mouth 2 (two) times a day with meals, Disp: 60 tablet, Rfl: 3    lisinopril (ZESTRIL) 2 5 mg tablet, Take 1 tablet (2 5 mg total) by mouth daily, Disp: 30 tablet, Rfl: 3    ranitidine (ZANTAC) 150 mg tablet, Take 1 tablet (150 mg total) by mouth 2 (two) times a day, Disp: 60 tablet, Rfl: 0    Health Maintenance     Health Maintenance   Topic Date Due    Diabetic Foot Exam  01/01/1989    BMI: Followup Plan  01/01/1997    HEPATITIS B VACCINES (1 of 3 - Risk 3-dose series) 01/01/1998    HEMOGLOBIN A1C  01/16/2019    Depression Screening PHQ  02/28/2019    DTaP,Tdap,and Td Vaccines (1 - Tdap) 07/16/2019 (Originally 1/1/2000)    INFLUENZA VACCINE  03/20/2020 (Originally 7/1/2018)    DM Eye Exam  03/20/2020 (Originally 1/1/1989)    Pneumococcal PPSV23 Medium Risk Adult (1 of 1 - PPSV23) 03/20/2020 (Originally 1998)   [address] MICROALBUMIN  2019    BMI: Adult  2020     There is no immunization history for the selected administration types on file for this patient  Viet La  3/20/2019 3:30 PM      Diabetic Foot Exam    Patient's shoes and socks removed  Right Foot/Ankle   Right Foot Inspection  Skin Exam: skin normal, skin intact and dry skin no warmth, no callus, no erythema, no maceration, no abnormal color, no pre-ulcer, no ulcer and no callus                          Toe Exam: ROM and strength within normal limitsno swelling, no tenderness, erythema and  no right toe deformity  Sensory   Vibration: diminished  Proprioception: intact   Monofilament testing: diminished  Vascular  Capillary refills: < 3 seconds  The right DP pulse is 2+  The right PT pulse is 2+  Left Foot/Ankle  Left Foot Inspection  Skin Exam: skin normal, skin intact and dry skinno warmth, no erythema, no maceration, normal color, no pre-ulcer, no ulcer and no callus                         Toe Exam: ROM and strength within normal limitsno swelling, no tenderness, no erythema and no left toe deformity                   Sensory   Vibration: diminished  Proprioception: intact  Monofilament: diminished  Vascular  Capillary refills: < 3 seconds  The left DP pulse is 2+  The left PT pulse is 2+  Assign Risk Category:  No deformity present; Loss of protective sensation;  No weak pulses       Risk: 1          PHQ-9 Depression Screening    PHQ-9:    Frequency of the following problems over the past two weeks:       Little interest or pleasure in doing things:  1 - several days  Feeling down, depressed, or hopeless:  3 - nearly every day  Trouble falling or staying asleep, or sleeping too much:  3 - nearly every day  Feeling tired or having little energy:  3 - nearly every day  Poor appetite or overeatin - not at all  Feeling bad about yourself - or that you are a failure or have let yourself or your family down:  1 - several days  Trouble concentrating on things, such as reading the newspaper or watching television:  1 - several days  Moving or speaking so slowly that other people could have noticed   Or the opposite - being so fidgety or restless that you have been moving around a lot more than usual:  0 - not at all  Thoughts that you would be better off dead, or of hurting yourself in some way:  0 - not at all  PHQ-2 Score:  4  PHQ-9 Score:  12

## 2019-05-10 ENCOUNTER — HOSPITAL ENCOUNTER (EMERGENCY)
Facility: HOSPITAL | Age: 39
Discharge: HOME | End: 2019-05-10
Attending: EMERGENCY MEDICINE | Admitting: EMERGENCY MEDICINE
Payer: COMMERCIAL

## 2019-05-10 ENCOUNTER — APPOINTMENT (EMERGENCY)
Dept: RADIOLOGY | Facility: HOSPITAL | Age: 39
End: 2019-05-10
Payer: COMMERCIAL

## 2019-05-10 VITALS
DIASTOLIC BLOOD PRESSURE: 77 MMHG | SYSTOLIC BLOOD PRESSURE: 120 MMHG | BODY MASS INDEX: 29.99 KG/M2 | TEMPERATURE: 98.7 F | HEIGHT: 65 IN | OXYGEN SATURATION: 98 % | RESPIRATION RATE: 20 BRPM | WEIGHT: 180 LBS | HEART RATE: 66 BPM

## 2019-05-10 DIAGNOSIS — N20.1 RIGHT URETERAL STONE: Primary | ICD-10-CM

## 2019-05-10 LAB
ALBUMIN SERPL BCP-MCNC: 4.1 G/DL (ref 3.5–5)
ALP SERPL-CCNC: 97 U/L (ref 46–116)
ALT SERPL W P-5'-P-CCNC: 88 U/L (ref 12–78)
ANION GAP SERPL CALCULATED.3IONS-SCNC: 7 MMOL/L (ref 4–13)
AST SERPL W P-5'-P-CCNC: 42 U/L (ref 5–45)
BACTERIA UR QL AUTO: ABNORMAL /HPF
BASOPHILS # BLD AUTO: 0.03 THOUSANDS/ΜL (ref 0–0.1)
BASOPHILS NFR BLD AUTO: 0 % (ref 0–1)
BILIRUB SERPL-MCNC: 0.63 MG/DL (ref 0.2–1)
BILIRUB UR QL STRIP: NEGATIVE
BUN SERPL-MCNC: 17 MG/DL (ref 5–25)
CALCIUM SERPL-MCNC: 9.1 MG/DL (ref 8.3–10.1)
CHLORIDE SERPL-SCNC: 104 MMOL/L (ref 100–108)
CLARITY UR: ABNORMAL
CO2 SERPL-SCNC: 24 MMOL/L (ref 21–32)
COLOR UR: YELLOW
COLOR, POC: YELLOW
COLOR, POC: YELLOW
CREAT SERPL-MCNC: 1.11 MG/DL (ref 0.6–1.3)
EOSINOPHIL # BLD AUTO: 0.07 THOUSAND/ΜL (ref 0–0.61)
EOSINOPHIL NFR BLD AUTO: 1 % (ref 0–6)
ERYTHROCYTE [DISTWIDTH] IN BLOOD BY AUTOMATED COUNT: 12 % (ref 11.6–15.1)
GFR SERPL CREATININE-BSD FRML MDRD: 83 ML/MIN/1.73SQ M
GLUCOSE SERPL-MCNC: 145 MG/DL (ref 65–140)
GLUCOSE UR STRIP-MCNC: ABNORMAL MG/DL
HCT VFR BLD AUTO: 38.4 % (ref 36.5–49.3)
HGB BLD-MCNC: 13.4 G/DL (ref 12–17)
HGB UR QL STRIP.AUTO: ABNORMAL
IMM GRANULOCYTES # BLD AUTO: 0.04 THOUSAND/UL (ref 0–0.2)
IMM GRANULOCYTES NFR BLD AUTO: 0 % (ref 0–2)
KETONES UR STRIP-MCNC: NEGATIVE MG/DL
LEUKOCYTE ESTERASE UR QL STRIP: NEGATIVE
LIPASE SERPL-CCNC: 107 U/L (ref 73–393)
LYMPHOCYTES # BLD AUTO: 2.81 THOUSANDS/ΜL (ref 0.6–4.47)
LYMPHOCYTES NFR BLD AUTO: 24 % (ref 14–44)
MCH RBC QN AUTO: 30.5 PG (ref 26.8–34.3)
MCHC RBC AUTO-ENTMCNC: 34.9 G/DL (ref 31.4–37.4)
MCV RBC AUTO: 87 FL (ref 82–98)
MONOCYTES # BLD AUTO: 0.95 THOUSAND/ΜL (ref 0.17–1.22)
MONOCYTES NFR BLD AUTO: 8 % (ref 4–12)
NEUTROPHILS # BLD AUTO: 7.7 THOUSANDS/ΜL (ref 1.85–7.62)
NEUTS SEG NFR BLD AUTO: 67 % (ref 43–75)
NITRITE UR QL STRIP: NEGATIVE
NON-SQ EPI CELLS URNS QL MICRO: ABNORMAL /HPF
NRBC BLD AUTO-RTO: 0 /100 WBCS
PH UR STRIP.AUTO: 5.5 [PH] (ref 4.5–8)
PLATELET # BLD AUTO: 235 THOUSANDS/UL (ref 149–390)
PMV BLD AUTO: 11.6 FL (ref 8.9–12.7)
POTASSIUM SERPL-SCNC: 3.4 MMOL/L (ref 3.5–5.3)
PROT SERPL-MCNC: 8.1 G/DL (ref 6.4–8.2)
PROT UR STRIP-MCNC: ABNORMAL MG/DL
RBC # BLD AUTO: 4.4 MILLION/UL (ref 3.88–5.62)
RBC #/AREA URNS AUTO: ABNORMAL /HPF
SODIUM SERPL-SCNC: 135 MMOL/L (ref 136–145)
SP GR UR STRIP.AUTO: >=1.03 (ref 1–1.03)
UROBILINOGEN UR QL STRIP.AUTO: 0.2 E.U./DL
WBC # BLD AUTO: 11.6 THOUSAND/UL (ref 4.31–10.16)
WBC #/AREA URNS AUTO: ABNORMAL /HPF

## 2019-05-10 PROCEDURE — 80053 COMPREHEN METABOLIC PANEL: CPT | Performed by: EMERGENCY MEDICINE

## 2019-05-10 PROCEDURE — 99284 EMERGENCY DEPT VISIT MOD MDM: CPT

## 2019-05-10 PROCEDURE — 96375 TX/PRO/DX INJ NEW DRUG ADDON: CPT

## 2019-05-10 PROCEDURE — 85025 COMPLETE CBC W/AUTO DIFF WBC: CPT | Performed by: EMERGENCY MEDICINE

## 2019-05-10 PROCEDURE — 96374 THER/PROPH/DIAG INJ IV PUSH: CPT

## 2019-05-10 PROCEDURE — 74176 CT ABD & PELVIS W/O CONTRAST: CPT

## 2019-05-10 PROCEDURE — 83690 ASSAY OF LIPASE: CPT | Performed by: EMERGENCY MEDICINE

## 2019-05-10 PROCEDURE — 36415 COLL VENOUS BLD VENIPUNCTURE: CPT | Performed by: EMERGENCY MEDICINE

## 2019-05-10 PROCEDURE — 81001 URINALYSIS AUTO W/SCOPE: CPT

## 2019-05-10 PROCEDURE — 99284 EMERGENCY DEPT VISIT MOD MDM: CPT | Performed by: EMERGENCY MEDICINE

## 2019-05-10 PROCEDURE — 76870 US EXAM SCROTUM: CPT

## 2019-05-10 PROCEDURE — 96361 HYDRATE IV INFUSION ADD-ON: CPT

## 2019-05-10 RX ORDER — NAPROXEN 500 MG/1
500 TABLET ORAL EVERY 12 HOURS PRN
Qty: 30 TABLET | Refills: 0 | Status: SHIPPED | OUTPATIENT
Start: 2019-05-10 | End: 2019-06-05

## 2019-05-10 RX ORDER — KETOROLAC TROMETHAMINE 30 MG/ML
15 INJECTION, SOLUTION INTRAMUSCULAR; INTRAVENOUS ONCE
Status: COMPLETED | OUTPATIENT
Start: 2019-05-10 | End: 2019-05-10

## 2019-05-10 RX ORDER — ONDANSETRON 2 MG/ML
4 INJECTION INTRAMUSCULAR; INTRAVENOUS ONCE
Status: COMPLETED | OUTPATIENT
Start: 2019-05-10 | End: 2019-05-10

## 2019-05-10 RX ORDER — OXYCODONE HYDROCHLORIDE AND ACETAMINOPHEN 5; 325 MG/1; MG/1
1 TABLET ORAL EVERY 4 HOURS PRN
Qty: 8 TABLET | Refills: 0 | Status: SHIPPED | OUTPATIENT
Start: 2019-05-10 | End: 2019-06-05

## 2019-05-10 RX ADMIN — KETOROLAC TROMETHAMINE 15 MG: 30 INJECTION, SOLUTION INTRAMUSCULAR at 16:53

## 2019-05-10 RX ADMIN — ONDANSETRON 4 MG: 2 INJECTION INTRAMUSCULAR; INTRAVENOUS at 16:53

## 2019-05-10 RX ADMIN — SODIUM CHLORIDE 1000 ML: 0.9 INJECTION, SOLUTION INTRAVENOUS at 16:53

## 2019-05-27 ENCOUNTER — ANESTHESIA EVENT (OUTPATIENT)
Dept: PERIOP | Facility: HOSPITAL | Age: 39
End: 2019-05-27
Payer: COMMERCIAL

## 2019-05-27 ENCOUNTER — APPOINTMENT (EMERGENCY)
Dept: RADIOLOGY | Facility: HOSPITAL | Age: 39
End: 2019-05-27
Payer: COMMERCIAL

## 2019-05-27 ENCOUNTER — HOSPITAL ENCOUNTER (OUTPATIENT)
Facility: HOSPITAL | Age: 39
Setting detail: OBSERVATION
Discharge: HOME | End: 2019-05-29
Attending: EMERGENCY MEDICINE | Admitting: INTERNAL MEDICINE
Payer: COMMERCIAL

## 2019-05-27 DIAGNOSIS — R10.9 RIGHT FLANK PAIN: ICD-10-CM

## 2019-05-27 DIAGNOSIS — N20.1 RIGHT URETERAL STONE: Primary | ICD-10-CM

## 2019-05-27 PROBLEM — J06.9 VIRAL URI WITH COUGH: Status: ACTIVE | Noted: 2019-05-27

## 2019-05-27 LAB
ALBUMIN SERPL BCP-MCNC: 3.9 G/DL (ref 3.5–5)
ALP SERPL-CCNC: 92 U/L (ref 46–116)
ALT SERPL W P-5'-P-CCNC: 45 U/L (ref 12–78)
ANION GAP SERPL CALCULATED.3IONS-SCNC: 7 MMOL/L (ref 4–13)
AST SERPL W P-5'-P-CCNC: 19 U/L (ref 5–45)
BACTERIA UR QL AUTO: ABNORMAL /HPF
BASOPHILS # BLD AUTO: 0.03 THOUSANDS/ΜL (ref 0–0.1)
BASOPHILS NFR BLD AUTO: 0 % (ref 0–1)
BILIRUB SERPL-MCNC: 0.66 MG/DL (ref 0.2–1)
BILIRUB UR QL STRIP: NEGATIVE
BUN SERPL-MCNC: 18 MG/DL (ref 5–25)
CALCIUM SERPL-MCNC: 8.8 MG/DL (ref 8.3–10.1)
CHLORIDE SERPL-SCNC: 105 MMOL/L (ref 100–108)
CLARITY UR: CLEAR
CO2 SERPL-SCNC: 23 MMOL/L (ref 21–32)
COLOR UR: YELLOW
CREAT SERPL-MCNC: 1.13 MG/DL (ref 0.6–1.3)
EOSINOPHIL # BLD AUTO: 0.16 THOUSAND/ΜL (ref 0–0.61)
EOSINOPHIL NFR BLD AUTO: 2 % (ref 0–6)
ERYTHROCYTE [DISTWIDTH] IN BLOOD BY AUTOMATED COUNT: 11.9 % (ref 11.6–15.1)
GFR SERPL CREATININE-BSD FRML MDRD: 81 ML/MIN/1.73SQ M
GLUCOSE SERPL-MCNC: 103 MG/DL (ref 65–140)
GLUCOSE UR STRIP-MCNC: NEGATIVE MG/DL
HCT VFR BLD AUTO: 36.6 % (ref 36.5–49.3)
HGB BLD-MCNC: 12.8 G/DL (ref 12–17)
HGB UR QL STRIP.AUTO: ABNORMAL
HYALINE CASTS #/AREA URNS LPF: ABNORMAL /LPF
IMM GRANULOCYTES # BLD AUTO: 0.01 THOUSAND/UL (ref 0–0.2)
IMM GRANULOCYTES NFR BLD AUTO: 0 % (ref 0–2)
KETONES UR STRIP-MCNC: NEGATIVE MG/DL
LEUKOCYTE ESTERASE UR QL STRIP: NEGATIVE
LYMPHOCYTES # BLD AUTO: 2.41 THOUSANDS/ΜL (ref 0.6–4.47)
LYMPHOCYTES NFR BLD AUTO: 29 % (ref 14–44)
MCH RBC QN AUTO: 30.1 PG (ref 26.8–34.3)
MCHC RBC AUTO-ENTMCNC: 35 G/DL (ref 31.4–37.4)
MCV RBC AUTO: 86 FL (ref 82–98)
MONOCYTES # BLD AUTO: 0.69 THOUSAND/ΜL (ref 0.17–1.22)
MONOCYTES NFR BLD AUTO: 8 % (ref 4–12)
NEUTROPHILS # BLD AUTO: 5.05 THOUSANDS/ΜL (ref 1.85–7.62)
NEUTS SEG NFR BLD AUTO: 61 % (ref 43–75)
NITRITE UR QL STRIP: NEGATIVE
NON-SQ EPI CELLS URNS QL MICRO: ABNORMAL /HPF
NRBC BLD AUTO-RTO: 0 /100 WBCS
PH UR STRIP.AUTO: 5.5 [PH] (ref 4.5–8)
PLATELET # BLD AUTO: 243 THOUSANDS/UL (ref 149–390)
PMV BLD AUTO: 11.5 FL (ref 8.9–12.7)
POTASSIUM SERPL-SCNC: 3.6 MMOL/L (ref 3.5–5.3)
PROT SERPL-MCNC: 7.9 G/DL (ref 6.4–8.2)
PROT UR STRIP-MCNC: ABNORMAL MG/DL
RBC # BLD AUTO: 4.25 MILLION/UL (ref 3.88–5.62)
RBC #/AREA URNS AUTO: ABNORMAL /HPF
SODIUM SERPL-SCNC: 135 MMOL/L (ref 136–145)
SP GR UR STRIP.AUTO: 1.02 (ref 1–1.03)
TROPONIN I SERPL-MCNC: <0.02 NG/ML
UROBILINOGEN UR QL STRIP.AUTO: 0.2 E.U./DL
WBC # BLD AUTO: 8.35 THOUSAND/UL (ref 4.31–10.16)
WBC #/AREA URNS AUTO: ABNORMAL /HPF

## 2019-05-27 PROCEDURE — 99285 EMERGENCY DEPT VISIT HI MDM: CPT | Performed by: EMERGENCY MEDICINE

## 2019-05-27 PROCEDURE — 93005 ELECTROCARDIOGRAM TRACING: CPT

## 2019-05-27 PROCEDURE — 99285 EMERGENCY DEPT VISIT HI MDM: CPT

## 2019-05-27 PROCEDURE — 96374 THER/PROPH/DIAG INJ IV PUSH: CPT

## 2019-05-27 PROCEDURE — 36415 COLL VENOUS BLD VENIPUNCTURE: CPT | Performed by: EMERGENCY MEDICINE

## 2019-05-27 PROCEDURE — 80053 COMPREHEN METABOLIC PANEL: CPT | Performed by: EMERGENCY MEDICINE

## 2019-05-27 PROCEDURE — 74176 CT ABD & PELVIS W/O CONTRAST: CPT

## 2019-05-27 PROCEDURE — 71046 X-RAY EXAM CHEST 2 VIEWS: CPT

## 2019-05-27 PROCEDURE — 84484 ASSAY OF TROPONIN QUANT: CPT | Performed by: EMERGENCY MEDICINE

## 2019-05-27 PROCEDURE — 81001 URINALYSIS AUTO W/SCOPE: CPT

## 2019-05-27 PROCEDURE — 85025 COMPLETE CBC W/AUTO DIFF WBC: CPT | Performed by: EMERGENCY MEDICINE

## 2019-05-27 RX ORDER — TAMSULOSIN HYDROCHLORIDE 0.4 MG/1
0.4 CAPSULE ORAL
Status: DISCONTINUED | OUTPATIENT
Start: 2019-05-28 | End: 2019-05-27

## 2019-05-27 RX ORDER — FAMOTIDINE 20 MG/1
20 TABLET, FILM COATED ORAL DAILY
Status: DISCONTINUED | OUTPATIENT
Start: 2019-05-28 | End: 2019-05-29 | Stop reason: HOSPADM

## 2019-05-27 RX ORDER — KETOROLAC TROMETHAMINE 30 MG/ML
15 INJECTION, SOLUTION INTRAMUSCULAR; INTRAVENOUS EVERY 6 HOURS PRN
Status: DISCONTINUED | OUTPATIENT
Start: 2019-05-27 | End: 2019-05-29

## 2019-05-27 RX ORDER — KETOROLAC TROMETHAMINE 30 MG/ML
15 INJECTION, SOLUTION INTRAMUSCULAR; INTRAVENOUS ONCE
Status: COMPLETED | OUTPATIENT
Start: 2019-05-27 | End: 2019-05-27

## 2019-05-27 RX ORDER — LISINOPRIL 2.5 MG/1
2.5 TABLET ORAL DAILY
Status: DISCONTINUED | OUTPATIENT
Start: 2019-05-28 | End: 2019-05-29 | Stop reason: HOSPADM

## 2019-05-27 RX ORDER — SODIUM CHLORIDE 9 MG/ML
125 INJECTION, SOLUTION INTRAVENOUS CONTINUOUS
Status: DISCONTINUED | OUTPATIENT
Start: 2019-05-27 | End: 2019-05-29 | Stop reason: HOSPADM

## 2019-05-27 RX ORDER — ACETAMINOPHEN 325 MG/1
650 TABLET ORAL EVERY 6 HOURS PRN
Status: DISCONTINUED | OUTPATIENT
Start: 2019-05-27 | End: 2019-05-29 | Stop reason: HOSPADM

## 2019-05-27 RX ORDER — TAMSULOSIN HYDROCHLORIDE 0.4 MG/1
0.4 CAPSULE ORAL
Status: DISCONTINUED | OUTPATIENT
Start: 2019-05-27 | End: 2019-05-29 | Stop reason: HOSPADM

## 2019-05-27 RX ADMIN — SODIUM CHLORIDE 125 ML/HR: 0.9 INJECTION, SOLUTION INTRAVENOUS at 20:36

## 2019-05-27 RX ADMIN — CEFTRIAXONE SODIUM 1000 MG: 10 INJECTION, POWDER, FOR SOLUTION INTRAVENOUS at 17:47

## 2019-05-27 RX ADMIN — KETOROLAC TROMETHAMINE 15 MG: 30 INJECTION, SOLUTION INTRAMUSCULAR at 14:48

## 2019-05-27 RX ADMIN — TAMSULOSIN HYDROCHLORIDE 0.4 MG: 0.4 CAPSULE ORAL at 20:42

## 2019-05-28 ENCOUNTER — ANESTHESIA (OUTPATIENT)
Dept: PERIOP | Facility: HOSPITAL | Age: 39
End: 2019-05-28
Payer: COMMERCIAL

## 2019-05-28 ENCOUNTER — APPOINTMENT (OUTPATIENT)
Dept: RADIOLOGY | Facility: HOSPITAL | Age: 39
End: 2019-05-28
Payer: COMMERCIAL

## 2019-05-28 LAB
ANION GAP SERPL CALCULATED.3IONS-SCNC: 5 MMOL/L (ref 4–13)
ATRIAL RATE: 68 BPM
BUN SERPL-MCNC: 22 MG/DL (ref 5–25)
CALCIUM SERPL-MCNC: 8.4 MG/DL (ref 8.3–10.1)
CHLORIDE SERPL-SCNC: 108 MMOL/L (ref 100–108)
CO2 SERPL-SCNC: 23 MMOL/L (ref 21–32)
CREAT SERPL-MCNC: 1.11 MG/DL (ref 0.6–1.3)
ERYTHROCYTE [DISTWIDTH] IN BLOOD BY AUTOMATED COUNT: 11.8 % (ref 11.6–15.1)
GFR SERPL CREATININE-BSD FRML MDRD: 83 ML/MIN/1.73SQ M
GLUCOSE P FAST SERPL-MCNC: 117 MG/DL (ref 65–99)
GLUCOSE SERPL-MCNC: 105 MG/DL (ref 65–140)
GLUCOSE SERPL-MCNC: 114 MG/DL (ref 65–140)
GLUCOSE SERPL-MCNC: 117 MG/DL (ref 65–140)
GLUCOSE SERPL-MCNC: 123 MG/DL (ref 65–140)
GLUCOSE SERPL-MCNC: 129 MG/DL (ref 65–140)
GLUCOSE SERPL-MCNC: 90 MG/DL (ref 65–140)
GLUCOSE SERPL-MCNC: 92 MG/DL (ref 65–140)
HCT VFR BLD AUTO: 35.7 % (ref 36.5–49.3)
HGB BLD-MCNC: 12.3 G/DL (ref 12–17)
MCH RBC QN AUTO: 30.1 PG (ref 26.8–34.3)
MCHC RBC AUTO-ENTMCNC: 34.5 G/DL (ref 31.4–37.4)
MCV RBC AUTO: 88 FL (ref 82–98)
P AXIS: 50 DEGREES
PLATELET # BLD AUTO: 239 THOUSANDS/UL (ref 149–390)
PMV BLD AUTO: 11.4 FL (ref 8.9–12.7)
POTASSIUM SERPL-SCNC: 3.8 MMOL/L (ref 3.5–5.3)
PR INTERVAL: 168 MS
QRS AXIS: 18 DEGREES
QRSD INTERVAL: 72 MS
QT INTERVAL: 382 MS
QTC INTERVAL: 406 MS
RBC # BLD AUTO: 4.08 MILLION/UL (ref 3.88–5.62)
SODIUM SERPL-SCNC: 136 MMOL/L (ref 136–145)
T WAVE AXIS: 22 DEGREES
VENTRICULAR RATE: 68 BPM
WBC # BLD AUTO: 8.99 THOUSAND/UL (ref 4.31–10.16)

## 2019-05-28 PROCEDURE — 88300 SURGICAL PATH GROSS: CPT | Performed by: PATHOLOGY

## 2019-05-28 PROCEDURE — 85027 COMPLETE CBC AUTOMATED: CPT | Performed by: INTERNAL MEDICINE

## 2019-05-28 PROCEDURE — 52352 CYSTOURETERO W/STONE REMOVE: CPT | Performed by: UROLOGY

## 2019-05-28 PROCEDURE — 80048 BASIC METABOLIC PNL TOTAL CA: CPT | Performed by: INTERNAL MEDICINE

## 2019-05-28 PROCEDURE — 82948 REAGENT STRIP/BLOOD GLUCOSE: CPT

## 2019-05-28 PROCEDURE — 93010 ELECTROCARDIOGRAM REPORT: CPT | Performed by: INTERNAL MEDICINE

## 2019-05-28 PROCEDURE — C2617 STENT, NON-COR, TEM W/O DEL: HCPCS | Performed by: UROLOGY

## 2019-05-28 PROCEDURE — 99244 OFF/OP CNSLTJ NEW/EST MOD 40: CPT | Performed by: UROLOGY

## 2019-05-28 PROCEDURE — 52332 CYSTOSCOPY AND TREATMENT: CPT | Performed by: UROLOGY

## 2019-05-28 PROCEDURE — C1769 GUIDE WIRE: HCPCS | Performed by: UROLOGY

## 2019-05-28 PROCEDURE — 74018 RADEX ABDOMEN 1 VIEW: CPT

## 2019-05-28 DEVICE — STENT URETERAL 6 FR 26CM INLAY OPTIMA: Type: IMPLANTABLE DEVICE | Site: URETER | Status: FUNCTIONAL

## 2019-05-28 RX ORDER — FENTANYL CITRATE/PF 50 MCG/ML
50 SYRINGE (ML) INJECTION
Status: DISCONTINUED | OUTPATIENT
Start: 2019-05-28 | End: 2019-05-28 | Stop reason: HOSPADM

## 2019-05-28 RX ORDER — MEPERIDINE HYDROCHLORIDE 25 MG/ML
12.5 INJECTION INTRAMUSCULAR; INTRAVENOUS; SUBCUTANEOUS ONCE AS NEEDED
Status: DISCONTINUED | OUTPATIENT
Start: 2019-05-28 | End: 2019-05-28 | Stop reason: HOSPADM

## 2019-05-28 RX ORDER — PROPOFOL 10 MG/ML
INJECTION, EMULSION INTRAVENOUS AS NEEDED
Status: DISCONTINUED | OUTPATIENT
Start: 2019-05-28 | End: 2019-05-28 | Stop reason: SURG

## 2019-05-28 RX ORDER — ONDANSETRON 2 MG/ML
4 INJECTION INTRAMUSCULAR; INTRAVENOUS ONCE AS NEEDED
Status: DISCONTINUED | OUTPATIENT
Start: 2019-05-28 | End: 2019-05-28 | Stop reason: HOSPADM

## 2019-05-28 RX ORDER — ONDANSETRON 2 MG/ML
INJECTION INTRAMUSCULAR; INTRAVENOUS AS NEEDED
Status: DISCONTINUED | OUTPATIENT
Start: 2019-05-28 | End: 2019-05-28 | Stop reason: SURG

## 2019-05-28 RX ORDER — PHENAZOPYRIDINE HYDROCHLORIDE 100 MG/1
200 TABLET, FILM COATED ORAL ONCE
Status: COMPLETED | OUTPATIENT
Start: 2019-05-28 | End: 2019-05-28

## 2019-05-28 RX ORDER — CEFAZOLIN SODIUM 2 G/50ML
SOLUTION INTRAVENOUS AS NEEDED
Status: DISCONTINUED | OUTPATIENT
Start: 2019-05-28 | End: 2019-05-28 | Stop reason: SURG

## 2019-05-28 RX ORDER — LIDOCAINE HYDROCHLORIDE 10 MG/ML
INJECTION, SOLUTION INFILTRATION; PERINEURAL AS NEEDED
Status: DISCONTINUED | OUTPATIENT
Start: 2019-05-28 | End: 2019-05-28 | Stop reason: SURG

## 2019-05-28 RX ORDER — SODIUM CHLORIDE 9 MG/ML
INJECTION, SOLUTION INTRAVENOUS CONTINUOUS PRN
Status: DISCONTINUED | OUTPATIENT
Start: 2019-05-28 | End: 2019-05-28 | Stop reason: SURG

## 2019-05-28 RX ORDER — MAGNESIUM HYDROXIDE 1200 MG/15ML
LIQUID ORAL AS NEEDED
Status: DISCONTINUED | OUTPATIENT
Start: 2019-05-28 | End: 2019-05-28 | Stop reason: HOSPADM

## 2019-05-28 RX ORDER — FENTANYL CITRATE 50 UG/ML
INJECTION, SOLUTION INTRAMUSCULAR; INTRAVENOUS AS NEEDED
Status: DISCONTINUED | OUTPATIENT
Start: 2019-05-28 | End: 2019-05-28 | Stop reason: SURG

## 2019-05-28 RX ADMIN — LIDOCAINE HYDROCHLORIDE 50 MG: 10 INJECTION, SOLUTION INFILTRATION; PERINEURAL at 20:10

## 2019-05-28 RX ADMIN — TAMSULOSIN HYDROCHLORIDE 0.4 MG: 0.4 CAPSULE ORAL at 16:13

## 2019-05-28 RX ADMIN — PROPOFOL 200 MG: 10 INJECTION, EMULSION INTRAVENOUS at 20:10

## 2019-05-28 RX ADMIN — SODIUM CHLORIDE: 0.9 INJECTION, SOLUTION INTRAVENOUS at 19:55

## 2019-05-28 RX ADMIN — CEFAZOLIN SODIUM 2000 MG: 2 SOLUTION INTRAVENOUS at 20:15

## 2019-05-28 RX ADMIN — LISINOPRIL 2.5 MG: 2.5 TABLET ORAL at 09:56

## 2019-05-28 RX ADMIN — FAMOTIDINE 20 MG: 20 TABLET ORAL at 09:56

## 2019-05-28 RX ADMIN — PHENAZOPYRIDINE HYDROCHLORIDE 200 MG: 100 TABLET ORAL at 23:46

## 2019-05-28 RX ADMIN — PROPOFOL 100 MG: 10 INJECTION, EMULSION INTRAVENOUS at 20:12

## 2019-05-28 RX ADMIN — FENTANYL CITRATE 50 MCG: 50 INJECTION, SOLUTION INTRAMUSCULAR; INTRAVENOUS at 20:27

## 2019-05-28 RX ADMIN — SODIUM CHLORIDE 125 ML/HR: 0.9 INJECTION, SOLUTION INTRAVENOUS at 12:34

## 2019-05-28 RX ADMIN — SODIUM CHLORIDE 125 ML/HR: 0.9 INJECTION, SOLUTION INTRAVENOUS at 21:24

## 2019-05-28 RX ADMIN — FENTANYL CITRATE 50 MCG: 50 INJECTION, SOLUTION INTRAMUSCULAR; INTRAVENOUS at 20:22

## 2019-05-28 RX ADMIN — ONDANSETRON 4 MG: 2 INJECTION INTRAMUSCULAR; INTRAVENOUS at 20:15

## 2019-05-29 ENCOUNTER — TELEPHONE (OUTPATIENT)
Dept: UROLOGY | Facility: MEDICAL CENTER | Age: 39
End: 2019-05-29

## 2019-05-29 VITALS
SYSTOLIC BLOOD PRESSURE: 135 MMHG | TEMPERATURE: 98.3 F | HEART RATE: 63 BPM | DIASTOLIC BLOOD PRESSURE: 87 MMHG | RESPIRATION RATE: 16 BRPM | OXYGEN SATURATION: 99 % | WEIGHT: 185.19 LBS | BODY MASS INDEX: 30.85 KG/M2

## 2019-05-29 PROBLEM — N20.1 RIGHT URETERAL STONE: Status: RESOLVED | Noted: 2019-05-27 | Resolved: 2019-05-29

## 2019-05-29 PROBLEM — R10.9 RIGHT FLANK PAIN: Status: RESOLVED | Noted: 2019-05-27 | Resolved: 2019-05-29

## 2019-05-29 LAB
ANION GAP SERPL CALCULATED.3IONS-SCNC: 13 MMOL/L (ref 4–13)
BUN SERPL-MCNC: 16 MG/DL (ref 5–25)
CALCIUM SERPL-MCNC: 8.2 MG/DL (ref 8.3–10.1)
CHLORIDE SERPL-SCNC: 108 MMOL/L (ref 100–108)
CO2 SERPL-SCNC: 20 MMOL/L (ref 21–32)
CREAT SERPL-MCNC: 1.13 MG/DL (ref 0.6–1.3)
ERYTHROCYTE [DISTWIDTH] IN BLOOD BY AUTOMATED COUNT: 12 % (ref 11.6–15.1)
GFR SERPL CREATININE-BSD FRML MDRD: 81 ML/MIN/1.73SQ M
GLUCOSE SERPL-MCNC: 116 MG/DL (ref 65–140)
GLUCOSE SERPL-MCNC: 145 MG/DL (ref 65–140)
GLUCOSE SERPL-MCNC: 176 MG/DL (ref 65–140)
HCT VFR BLD AUTO: 35.3 % (ref 36.5–49.3)
HGB BLD-MCNC: 12 G/DL (ref 12–17)
MCH RBC QN AUTO: 30.2 PG (ref 26.8–34.3)
MCHC RBC AUTO-ENTMCNC: 34 G/DL (ref 31.4–37.4)
MCV RBC AUTO: 89 FL (ref 82–98)
PLATELET # BLD AUTO: 258 THOUSANDS/UL (ref 149–390)
PMV BLD AUTO: 12.4 FL (ref 8.9–12.7)
POTASSIUM SERPL-SCNC: 3.7 MMOL/L (ref 3.5–5.3)
RBC # BLD AUTO: 3.98 MILLION/UL (ref 3.88–5.62)
SODIUM SERPL-SCNC: 141 MMOL/L (ref 136–145)
WBC # BLD AUTO: 7.95 THOUSAND/UL (ref 4.31–10.16)

## 2019-05-29 PROCEDURE — 80048 BASIC METABOLIC PNL TOTAL CA: CPT | Performed by: UROLOGY

## 2019-05-29 PROCEDURE — 82360 CALCULUS ASSAY QUANT: CPT | Performed by: UROLOGY

## 2019-05-29 PROCEDURE — 82948 REAGENT STRIP/BLOOD GLUCOSE: CPT

## 2019-05-29 PROCEDURE — 85027 COMPLETE CBC AUTOMATED: CPT | Performed by: UROLOGY

## 2019-05-29 PROCEDURE — 99225 PR SBSQ OBSERVATION CARE/DAY 25 MINUTES: CPT | Performed by: NURSE PRACTITIONER

## 2019-05-29 RX ORDER — KETOROLAC TROMETHAMINE 10 MG/1
10 TABLET, FILM COATED ORAL EVERY 6 HOURS PRN
Status: DISCONTINUED | OUTPATIENT
Start: 2019-05-29 | End: 2019-05-29 | Stop reason: HOSPADM

## 2019-05-29 RX ORDER — TAMSULOSIN HYDROCHLORIDE 0.4 MG/1
0.4 CAPSULE ORAL
Qty: 7 CAPSULE | Refills: 0 | Status: SHIPPED | OUTPATIENT
Start: 2019-05-29 | End: 2019-05-29

## 2019-05-29 RX ORDER — OXYBUTYNIN CHLORIDE 5 MG/1
5 TABLET ORAL 3 TIMES DAILY PRN
Qty: 15 TABLET | Refills: 0 | Status: SHIPPED | OUTPATIENT
Start: 2019-05-29 | End: 2019-05-31 | Stop reason: SDUPTHER

## 2019-05-29 RX ORDER — TAMSULOSIN HYDROCHLORIDE 0.4 MG/1
0.4 CAPSULE ORAL
Qty: 7 CAPSULE | Refills: 0 | Status: SHIPPED | OUTPATIENT
Start: 2019-05-29 | End: 2019-06-05

## 2019-05-29 RX ORDER — OXYBUTYNIN CHLORIDE 5 MG/1
5 TABLET ORAL 3 TIMES DAILY PRN
Status: DISCONTINUED | OUTPATIENT
Start: 2019-05-29 | End: 2019-05-29 | Stop reason: HOSPADM

## 2019-05-29 RX ADMIN — FAMOTIDINE 20 MG: 20 TABLET ORAL at 09:47

## 2019-05-29 RX ADMIN — LISINOPRIL 2.5 MG: 2.5 TABLET ORAL at 09:47

## 2019-05-30 ENCOUNTER — TRANSITIONAL CARE MANAGEMENT (OUTPATIENT)
Dept: INTERNAL MEDICINE CLINIC | Facility: CLINIC | Age: 39
End: 2019-05-30

## 2019-05-31 DIAGNOSIS — N20.1 RIGHT URETERAL STONE: ICD-10-CM

## 2019-05-31 RX ORDER — OXYBUTYNIN CHLORIDE 5 MG/1
5 TABLET ORAL 3 TIMES DAILY PRN
Qty: 15 TABLET | Refills: 0 | Status: SHIPPED | OUTPATIENT
Start: 2019-05-31 | End: 2019-05-31 | Stop reason: SDUPTHER

## 2019-05-31 RX ORDER — OXYBUTYNIN CHLORIDE 5 MG/1
5 TABLET ORAL 3 TIMES DAILY PRN
Qty: 60 TABLET | Refills: 1 | Status: SHIPPED | OUTPATIENT
Start: 2019-05-31 | End: 2019-07-18 | Stop reason: ALTCHOICE

## 2019-06-02 ENCOUNTER — HOSPITAL ENCOUNTER (EMERGENCY)
Facility: HOSPITAL | Age: 39
Discharge: HOME | End: 2019-06-02
Attending: EMERGENCY MEDICINE
Payer: COMMERCIAL

## 2019-06-02 ENCOUNTER — APPOINTMENT (EMERGENCY)
Dept: RADIOLOGY | Facility: HOSPITAL | Age: 39
End: 2019-06-02
Payer: COMMERCIAL

## 2019-06-02 VITALS
OXYGEN SATURATION: 97 % | WEIGHT: 185.19 LBS | HEART RATE: 68 BPM | BODY MASS INDEX: 30.85 KG/M2 | RESPIRATION RATE: 18 BRPM | DIASTOLIC BLOOD PRESSURE: 77 MMHG | SYSTOLIC BLOOD PRESSURE: 118 MMHG | TEMPERATURE: 97.5 F

## 2019-06-02 DIAGNOSIS — R51.9 HEADACHE: Primary | ICD-10-CM

## 2019-06-02 DIAGNOSIS — B34.9 VIRAL SYNDROME: ICD-10-CM

## 2019-06-02 LAB
ALBUMIN SERPL BCP-MCNC: 4.1 G/DL (ref 3.5–5)
ALP SERPL-CCNC: 88 U/L (ref 46–116)
ALT SERPL W P-5'-P-CCNC: 56 U/L (ref 12–78)
ANION GAP SERPL CALCULATED.3IONS-SCNC: 11 MMOL/L (ref 4–13)
AST SERPL W P-5'-P-CCNC: 26 U/L (ref 5–45)
BACTERIA UR QL AUTO: ABNORMAL /HPF
BASOPHILS # BLD AUTO: 0.04 THOUSANDS/ΜL (ref 0–0.1)
BASOPHILS NFR BLD AUTO: 0 % (ref 0–1)
BILIRUB SERPL-MCNC: 0.51 MG/DL (ref 0.2–1)
BILIRUB UR QL STRIP: NEGATIVE
BUN SERPL-MCNC: 14 MG/DL (ref 5–25)
CALCIUM SERPL-MCNC: 8.4 MG/DL (ref 8.3–10.1)
CHLORIDE SERPL-SCNC: 104 MMOL/L (ref 100–108)
CLARITY UR: CLEAR
CO2 SERPL-SCNC: 20 MMOL/L (ref 21–32)
COLOR UR: YELLOW
COLOR, POC: YELLOW
CREAT SERPL-MCNC: 1.04 MG/DL (ref 0.6–1.3)
EOSINOPHIL # BLD AUTO: 0.17 THOUSAND/ΜL (ref 0–0.61)
EOSINOPHIL NFR BLD AUTO: 2 % (ref 0–6)
ERYTHROCYTE [DISTWIDTH] IN BLOOD BY AUTOMATED COUNT: 12 % (ref 11.6–15.1)
GFR SERPL CREATININE-BSD FRML MDRD: 89 ML/MIN/1.73SQ M
GLUCOSE SERPL-MCNC: 148 MG/DL (ref 65–140)
GLUCOSE UR STRIP-MCNC: NEGATIVE MG/DL
HCT VFR BLD AUTO: 39.7 % (ref 36.5–49.3)
HGB BLD-MCNC: 14 G/DL (ref 12–17)
HGB UR QL STRIP.AUTO: ABNORMAL
HYALINE CASTS #/AREA URNS LPF: ABNORMAL /LPF
IMM GRANULOCYTES # BLD AUTO: 0.05 THOUSAND/UL (ref 0–0.2)
IMM GRANULOCYTES NFR BLD AUTO: 1 % (ref 0–2)
KETONES UR STRIP-MCNC: NEGATIVE MG/DL
LEUKOCYTE ESTERASE UR QL STRIP: ABNORMAL
LIPASE SERPL-CCNC: 76 U/L (ref 73–393)
LYMPHOCYTES # BLD AUTO: 2.45 THOUSANDS/ΜL (ref 0.6–4.47)
LYMPHOCYTES NFR BLD AUTO: 22 % (ref 14–44)
MCH RBC QN AUTO: 30.4 PG (ref 26.8–34.3)
MCHC RBC AUTO-ENTMCNC: 35.3 G/DL (ref 31.4–37.4)
MCV RBC AUTO: 86 FL (ref 82–98)
MONOCYTES # BLD AUTO: 0.68 THOUSAND/ΜL (ref 0.17–1.22)
MONOCYTES NFR BLD AUTO: 6 % (ref 4–12)
NEUTROPHILS # BLD AUTO: 7.72 THOUSANDS/ΜL (ref 1.85–7.62)
NEUTS SEG NFR BLD AUTO: 69 % (ref 43–75)
NITRITE UR QL STRIP: NEGATIVE
NON-SQ EPI CELLS URNS QL MICRO: ABNORMAL /HPF
NRBC BLD AUTO-RTO: 0 /100 WBCS
PH UR STRIP.AUTO: 5.5 [PH] (ref 4.5–8)
PLATELET # BLD AUTO: 266 THOUSANDS/UL (ref 149–390)
PMV BLD AUTO: 11.4 FL (ref 8.9–12.7)
POTASSIUM SERPL-SCNC: 3.4 MMOL/L (ref 3.5–5.3)
PROT SERPL-MCNC: 7.9 G/DL (ref 6.4–8.2)
PROT UR STRIP-MCNC: ABNORMAL MG/DL
RBC # BLD AUTO: 4.61 MILLION/UL (ref 3.88–5.62)
RBC #/AREA URNS AUTO: ABNORMAL /HPF
SODIUM SERPL-SCNC: 135 MMOL/L (ref 136–145)
SP GR UR STRIP.AUTO: 1.01 (ref 1–1.03)
TROPONIN I SERPL-MCNC: <0.02 NG/ML
UROBILINOGEN UR QL STRIP.AUTO: 0.2 E.U./DL
WBC # BLD AUTO: 11.11 THOUSAND/UL (ref 4.31–10.16)
WBC #/AREA URNS AUTO: ABNORMAL /HPF

## 2019-06-02 PROCEDURE — 36415 COLL VENOUS BLD VENIPUNCTURE: CPT | Performed by: EMERGENCY MEDICINE

## 2019-06-02 PROCEDURE — 80053 COMPREHEN METABOLIC PANEL: CPT | Performed by: EMERGENCY MEDICINE

## 2019-06-02 PROCEDURE — 84484 ASSAY OF TROPONIN QUANT: CPT | Performed by: EMERGENCY MEDICINE

## 2019-06-02 PROCEDURE — 83690 ASSAY OF LIPASE: CPT | Performed by: EMERGENCY MEDICINE

## 2019-06-02 PROCEDURE — 85025 COMPLETE CBC W/AUTO DIFF WBC: CPT | Performed by: EMERGENCY MEDICINE

## 2019-06-02 PROCEDURE — 74177 CT ABD & PELVIS W/CONTRAST: CPT

## 2019-06-02 PROCEDURE — 96361 HYDRATE IV INFUSION ADD-ON: CPT

## 2019-06-02 PROCEDURE — 81001 URINALYSIS AUTO W/SCOPE: CPT

## 2019-06-02 PROCEDURE — 96374 THER/PROPH/DIAG INJ IV PUSH: CPT

## 2019-06-02 PROCEDURE — 99284 EMERGENCY DEPT VISIT MOD MDM: CPT

## 2019-06-02 PROCEDURE — 93005 ELECTROCARDIOGRAM TRACING: CPT

## 2019-06-02 PROCEDURE — 99284 EMERGENCY DEPT VISIT MOD MDM: CPT | Performed by: EMERGENCY MEDICINE

## 2019-06-02 PROCEDURE — 96375 TX/PRO/DX INJ NEW DRUG ADDON: CPT

## 2019-06-02 RX ORDER — KETOROLAC TROMETHAMINE 30 MG/ML
15 INJECTION, SOLUTION INTRAMUSCULAR; INTRAVENOUS ONCE
Status: COMPLETED | OUTPATIENT
Start: 2019-06-02 | End: 2019-06-02

## 2019-06-02 RX ORDER — DIPHENHYDRAMINE HYDROCHLORIDE 50 MG/ML
25 INJECTION INTRAMUSCULAR; INTRAVENOUS ONCE
Status: COMPLETED | OUTPATIENT
Start: 2019-06-02 | End: 2019-06-02

## 2019-06-02 RX ORDER — MECLIZINE HYDROCHLORIDE 25 MG/1
25 TABLET ORAL ONCE
Status: COMPLETED | OUTPATIENT
Start: 2019-06-02 | End: 2019-06-02

## 2019-06-02 RX ORDER — METOCLOPRAMIDE HYDROCHLORIDE 5 MG/ML
10 INJECTION INTRAMUSCULAR; INTRAVENOUS ONCE
Status: COMPLETED | OUTPATIENT
Start: 2019-06-02 | End: 2019-06-02

## 2019-06-02 RX ADMIN — MECLIZINE HYDROCHLORIDE 25 MG: 25 TABLET ORAL at 07:41

## 2019-06-02 RX ADMIN — DIPHENHYDRAMINE HYDROCHLORIDE 25 MG: 50 INJECTION, SOLUTION INTRAMUSCULAR; INTRAVENOUS at 07:45

## 2019-06-02 RX ADMIN — KETOROLAC TROMETHAMINE 15 MG: 30 INJECTION, SOLUTION INTRAMUSCULAR at 07:44

## 2019-06-02 RX ADMIN — SODIUM CHLORIDE 1000 ML: 0.9 INJECTION, SOLUTION INTRAVENOUS at 07:35

## 2019-06-02 RX ADMIN — METOCLOPRAMIDE 10 MG: 5 INJECTION, SOLUTION INTRAMUSCULAR; INTRAVENOUS at 07:42

## 2019-06-02 RX ADMIN — IOHEXOL 100 ML: 350 INJECTION, SOLUTION INTRAVENOUS at 08:26

## 2019-06-03 LAB
ATRIAL RATE: 64 BPM
P AXIS: 44 DEGREES
PR INTERVAL: 176 MS
QRS AXIS: 66 DEGREES
QRSD INTERVAL: 80 MS
QT INTERVAL: 402 MS
QTC INTERVAL: 414 MS
T WAVE AXIS: 40 DEGREES
VENTRICULAR RATE: 64 BPM

## 2019-06-03 PROCEDURE — 93010 ELECTROCARDIOGRAM REPORT: CPT | Performed by: INTERNAL MEDICINE

## 2019-06-04 LAB
CA PHOS MFR STONE: 5 %
CALCIUM OXALATE DIHYDRATE MFR STONE IR: 40 %
COLOR STONE: NORMAL
COM MFR STONE: 55 %
COMMENT-STONE3: NORMAL
COMPOSITION: NORMAL
LABORATORY COMMENT REPORT: NORMAL
NIDUS STONE QL: NORMAL
PHOTO: NORMAL
SIZE STONE: NORMAL MM
STONE ANALYSIS-IMP: NORMAL
STONE ANALYSIS-IMP: NORMAL
WT STONE: 14.6 MG

## 2019-06-05 ENCOUNTER — CLINICAL SUPPORT (OUTPATIENT)
Dept: UROLOGY | Facility: MEDICAL CENTER | Age: 39
End: 2019-06-05
Payer: COMMERCIAL

## 2019-06-05 ENCOUNTER — OFFICE VISIT (OUTPATIENT)
Dept: INTERNAL MEDICINE CLINIC | Facility: CLINIC | Age: 39
End: 2019-06-05

## 2019-06-05 VITALS — HEART RATE: 101 BPM | WEIGHT: 186 LBS | HEIGHT: 65 IN | TEMPERATURE: 98.8 F | BODY MASS INDEX: 30.99 KG/M2

## 2019-06-05 VITALS
HEART RATE: 68 BPM | WEIGHT: 186.07 LBS | DIASTOLIC BLOOD PRESSURE: 80 MMHG | BODY MASS INDEX: 31 KG/M2 | TEMPERATURE: 98 F | SYSTOLIC BLOOD PRESSURE: 110 MMHG | HEIGHT: 65 IN

## 2019-06-05 DIAGNOSIS — E11.9 TYPE 2 DIABETES MELLITUS WITHOUT COMPLICATION, WITHOUT LONG-TERM CURRENT USE OF INSULIN (HCC): ICD-10-CM

## 2019-06-05 DIAGNOSIS — E08.21 DIABETES MELLITUS DUE TO UNDERLYING CONDITION WITH DIABETIC NEPHROPATHY, WITHOUT LONG-TERM CURRENT USE OF INSULIN (HCC): ICD-10-CM

## 2019-06-05 DIAGNOSIS — Z96.0 STATUS POST CYSTOSCOPY WITH URETERAL STENT PLACEMENT: Chronic | ICD-10-CM

## 2019-06-05 DIAGNOSIS — N20.0 NEPHROLITHIASIS: Primary | ICD-10-CM

## 2019-06-05 DIAGNOSIS — N20.1 RIGHT URETERAL STONE: Primary | ICD-10-CM

## 2019-06-05 PROCEDURE — 99496 TRANSJ CARE MGMT HIGH F2F 7D: CPT | Performed by: HOSPITALIST

## 2019-06-05 PROCEDURE — 99211 OFF/OP EST MAY X REQ PHY/QHP: CPT

## 2019-06-05 RX ORDER — LISINOPRIL 2.5 MG/1
2.5 TABLET ORAL DAILY
Qty: 30 TABLET | Refills: 3 | Status: SHIPPED | OUTPATIENT
Start: 2019-06-05 | End: 2020-05-12 | Stop reason: SDUPTHER

## 2019-06-05 RX ORDER — TAMSULOSIN HYDROCHLORIDE 0.4 MG/1
0.4 CAPSULE ORAL
Qty: 7 CAPSULE | Refills: 0 | Status: SHIPPED | OUTPATIENT
Start: 2019-06-05 | End: 2019-07-18 | Stop reason: ALTCHOICE

## 2019-07-18 ENCOUNTER — OFFICE VISIT (OUTPATIENT)
Dept: UROLOGY | Facility: MEDICAL CENTER | Age: 39
End: 2019-07-18
Payer: COMMERCIAL

## 2019-07-18 VITALS
HEART RATE: 53 BPM | HEIGHT: 65 IN | DIASTOLIC BLOOD PRESSURE: 60 MMHG | SYSTOLIC BLOOD PRESSURE: 112 MMHG | BODY MASS INDEX: 30.49 KG/M2 | WEIGHT: 183 LBS

## 2019-07-18 DIAGNOSIS — N20.0 NEPHROLITHIASIS: Primary | ICD-10-CM

## 2019-07-18 PROCEDURE — 99213 OFFICE O/P EST LOW 20 MIN: CPT | Performed by: UROLOGY

## 2019-07-18 NOTE — PROGRESS NOTES
[address] [facility]  [facility]  [address] [facility], [address] [place] [address]  [phone]  www  [facility]  org      NAME: Be Del Rio  AGE: 35 y o  SEX: male  : 1980   MRN: [id number]    DATE: 2019  TIME: 9:07 AM    Assessment and Plan:    Right ureteral calculus status post extraction  Calcium oxalate  Consult regarding diet  He is to drink more water with lemon  Follow-up 6 months with a renal ultrasound  Chief Complaint   No chief complaint on file  History of Present Illness   History of Right ureteral calculus:  Status post cystoscopy, right ureteroscopy stone basket extraction and right ureteral stent insertion by me 2019, the stent has since been removed  Here for follow-up regarding this  He had abdominal pain after the procedure and underwent another CT scan which showed no stones no urinoma or any other issues, just constipation  The stone was 55% calcium oxalate monohydrate 40% calcium oxalate dihydrate  It was only 5% calcium phosphate  He is voiding well and he has some bilateral flank pain which he feels may be inflammation occasionally  No problems with his bowels  The following portions of the patient's history were reviewed and updated as appropriate: allergies, current medications, past family history, past medical history, past social history, past surgical history and problem list   Past Medical History:   Diagnosis Date    Cardiomegaly     Diabetes mellitus ([address] )      Past Surgical History:   Procedure Laterality Date    EYE SURGERY      FOR DECREASED VISION, PTERYGIUM    [place] CYSTO/URETERO W/LITHOTRIPSY &INDWELL STENT INSRT Right 2019    Procedure: CYSTOSCOPY, URETEROSCOPY, STONE BASKET RETRIEVAL, AND INSERTION STENT URETERAL;  Surgeon: Jackson Doe MD;  Location: [facility];  Service: Urology     shoulder  Review of Systems   Review of Systems   Gastrointestinal: Negative  Genitourinary: Negative  Active Problem List     Patient Active Problem List   Diagnosis    Acute bilateral low back pain without sciatica    Decreased vision    Dyspepsia    Impaired fasting blood sugar    Overweight    Proteinuria    Pterygium of left eye    Subclinical hypothyroidism    Symptoms involving urinary system    Dizziness    Type 2 diabetes mellitus without complication, without long-term current use of insulin (HCC)    Diabetes mellitus due to underlying condition with diabetic nephropathy, without long-term current use of insulin (HCC)    Viral URI with cough    Status post cystoscopy with ureteral stent placement       Objective   /60   Pulse (!) 53   Ht 5' 5" (1 651 m)   Wt 83 kg (183 lb)   BMI 30 45 kg/m²     Physical Exam   Constitutional: He is oriented to person, place, and time  He appears well-developed and well-nourished  HENT:   Head: Normocephalic and atraumatic  Eyes: EOM are normal    Neck: Normal range of motion  Pulmonary/Chest: Effort normal    Musculoskeletal: Normal range of motion  Neurological: He is alert and oriented to person, place, and time  Skin: Skin is warm and dry  Psychiatric: He has a normal mood and affect   His behavior is normal  Judgment and thought content normal            Current Medications     Current Outpatient Medications:     lisinopril (ZESTRIL) 2 5 mg tablet, Take 1 tablet (2 5 mg total) by mouth daily, Disp: 30 tablet, Rfl: 3    metFORMIN (GLUCOPHAGE) 1000 MG tablet, Take 0 5 tablets (500 mg total) by mouth 2 (two) times a day with meals, Disp: 60 tablet, Rfl: 3    tamsulosin (FLOMAX) 0 4 mg, Take 1 capsule (0 4 mg total) by mouth daily with dinner, Disp: 7 capsule, Rfl: 0    oxybutynin (DITROPAN) 5 mg tablet, Take 1 tablet (5 mg total) by mouth 3 (three) times a day as needed (bladder spasms) (Patient not taking: Reported on 7/18/2019), Disp: 60 tablet, Rfl: 1        Mac Shah MD

## 2019-12-18 ENCOUNTER — HOSPITAL ENCOUNTER (OUTPATIENT)
Dept: RADIOLOGY | Facility: HOSPITAL | Age: 39
Discharge: HOME | End: 2019-12-18
Attending: UROLOGY

## 2019-12-18 DIAGNOSIS — N20.0 NEPHROLITHIASIS: ICD-10-CM

## 2019-12-18 PROCEDURE — 76770 US EXAM ABDO BACK WALL COMP: CPT

## 2019-12-30 NOTE — PROGRESS NOTES
[address] [facility]  [facility]  [address] [facility] [place]  [place], [address] [facility]  [phone]  www  [facility]  org      NAME: Thea Wilhelm  AGE: 35 y o  SEX: male  : 1980   MRN: [id number]    DATE: 2019  TIME: 9:09 AM    Assessment and Plan:    Right ureteral calculus, status post extraction doing well with no sign of stricture or recurrence of stones  Follow-up p r n  Encounter for prostate cancer screening:  Given that he is 35years old we will get a baseline PSA  I recommend prostate cancer screening starting at age 48 if this 1 is normal                Chief Complaint   No chief complaint on file  History of Present Illness   Six-month follow-up status post cystoscopy, right ureteroscopy stone basket extraction right ureteral stent placement by me 2019  The stone was 55% calcium oxalate monohydrate and 40% calcium oxalate dihydrate  Renal ultrasound 2019 was completely normal       The following portions of the patient's history were reviewed and updated as appropriate: allergies, current medications, past family history, past medical history, past social history, past surgical history and problem list   Past Medical History:   Diagnosis Date    Cardiomegaly     Diabetes mellitus ([address] )      Past Surgical History:   Procedure Laterality Date    EYE SURGERY      FOR DECREASED VISION, PTERYGIUM    ME CYSTO/URETERO W/LITHOTRIPSY &INDWELL STENT INSRT Right 2019    Procedure: CYSTOSCOPY, URETEROSCOPY, STONE BASKET RETRIEVAL, AND INSERTION STENT URETERAL;  Surgeon: Yann Alicea MD;  [facility];  Service: Urology     shoulder  Review of Systems   Review of Systems   Genitourinary: Negative          Active Problem List     Patient Active Problem List   Diagnosis    Acute bilateral low back pain without sciatica    Decreased vision    Dyspepsia    Impaired fasting blood sugar    Overweight    Proteinuria  Pterygium of left eye    Subclinical hypothyroidism    Symptoms involving urinary system    Dizziness    Type 2 diabetes mellitus without complication, without long-term current use of insulin (HCC)    Diabetes mellitus due to underlying condition with diabetic nephropathy, without long-term current use of insulin (HCC)    Viral URI with cough    Status post cystoscopy with ureteral stent placement       Objective   /78   Pulse 60   Ht 5' 5" (1 651 m)   Wt 83 kg (183 lb)   BMI 30 45 kg/m²     Physical Exam   Constitutional: He is oriented to person, place, and time  He appears well-developed and well-nourished  HENT:   Head: Normocephalic and atraumatic  Eyes: EOM are normal    Neck: Normal range of motion  Pulmonary/Chest: Effort normal    Musculoskeletal: Normal range of motion  Neurological: He is alert and oriented to person, place, and time  Skin: Skin is warm and dry  Psychiatric: He has a normal mood and affect   His behavior is normal  Judgment and thought content normal            Current Medications     Current Outpatient Medications:     lisinopril (ZESTRIL) 2 5 mg tablet, Take 1 tablet (2 5 mg total) by mouth daily, Disp: 30 tablet, Rfl: 3    metFORMIN (GLUCOPHAGE) 1000 MG tablet, Take 0 5 tablets (500 mg total) by mouth 2 (two) times a day with meals, Disp: 60 tablet, Rfl: 3        Ginny Yang MD

## 2019-12-31 ENCOUNTER — OFFICE VISIT (OUTPATIENT)
Dept: UROLOGY | Facility: MEDICAL CENTER | Age: 39
End: 2019-12-31

## 2019-12-31 VITALS
SYSTOLIC BLOOD PRESSURE: 138 MMHG | HEART RATE: 60 BPM | WEIGHT: 183 LBS | DIASTOLIC BLOOD PRESSURE: 78 MMHG | HEIGHT: 65 IN | BODY MASS INDEX: 30.49 KG/M2

## 2019-12-31 DIAGNOSIS — Z12.5 ENCOUNTER FOR PROSTATE CANCER SCREENING: Primary | ICD-10-CM

## 2019-12-31 PROCEDURE — 99213 OFFICE O/P EST LOW 20 MIN: CPT | Performed by: UROLOGY

## 2020-01-14 ENCOUNTER — TELEPHONE (OUTPATIENT)
Dept: UROLOGY | Facility: MEDICAL CENTER | Age: 40
End: 2020-01-14

## 2020-01-15 ENCOUNTER — OFFICE VISIT (OUTPATIENT)
Dept: UROLOGY | Facility: MEDICAL CENTER | Age: 40
End: 2020-01-15

## 2020-01-15 VITALS
WEIGHT: 188.8 LBS | HEIGHT: 65 IN | BODY MASS INDEX: 31.46 KG/M2 | SYSTOLIC BLOOD PRESSURE: 124 MMHG | DIASTOLIC BLOOD PRESSURE: 82 MMHG | HEART RATE: 66 BPM

## 2020-01-15 DIAGNOSIS — N48.1 BALANITIS: Primary | ICD-10-CM

## 2020-01-15 PROCEDURE — 99213 OFFICE O/P EST LOW 20 MIN: CPT | Performed by: PHYSICIAN ASSISTANT

## 2020-01-15 RX ORDER — CLOTRIMAZOLE AND BETAMETHASONE DIPROPIONATE 10; .64 MG/G; MG/G
CREAM TOPICAL 2 TIMES DAILY
Qty: 30 G | Refills: 2 | Status: SHIPPED | OUTPATIENT
Start: 2020-01-15 | End: 2020-09-30 | Stop reason: SDUPTHER

## 2020-01-15 NOTE — PROGRESS NOTES
1/15/2020      Chief Complaint   Patient presents with    Nephrolithiasis    balanitis       Assessment and Plan    38 y o  male managed by Dr Steph Gaxiola    1  Balanitis  - will start the patient on clotrimazole betamethasone 2 times daily, discussed with the patient that as his balanitis resolves he can cut down to 1 time daily and as needed from there  - will see the patient back in the next month to ensure the clotrimazole betamethasone is working well for him    2  Nephrolithiasis s/p right ureteroscopy 5/20/19  - proper hydration was 60 ounces of water daily  - can obtain occasional imaging if needed    Follow up in the next month for wound check      History of Present Illness  Kiara Ludwig is a 38 y o  male here for evaluation of a red bumpy rash on the patient's penis  He states that there is not significant pain with this but there is some burning  He is not circumcised  He is not diabetic  This is the 1st time this has occurred  The patient is known to us for nephrolithiasis  He underwent right ureteroscopy 5/20/2019 has been doing well from a stone standpoint  The patient is Citizen of [place]-speaking only and a  via [email or web address] was used      Review of Systems   Constitutional: Negative for activity change, chills and fever  Gastrointestinal: Negative for abdominal distention and abdominal pain  Musculoskeletal: Negative for back pain and gait problem  Psychiatric/Behavioral: Negative for behavioral problems and confusion         Past Medical History  Past Medical History:   Diagnosis Date    Cardiomegaly     Diabetes mellitus ([address] )        Past Social History  Past Surgical History:   Procedure Laterality Date    EYE SURGERY      FOR DECREASED VISION, PTERYGIUM    TX CYSTO/URETERO W/LITHOTRIPSY &INDWELL STENT INSRT Right 5/28/2019    Procedure: CYSTOSCOPY, URETEROSCOPY, STONE BASKET RETRIEVAL, AND INSERTION STENT URETERAL;  Surgeon: Aracely Wright MD;  [facility]; Service: Urology     Social History     Tobacco Use   Smoking Status Former Smoker   Smokeless Tobacco Former User   Tobacco Comment    pt "quit 5 years ago"       Past Family History  Family History   Problem Relation Age of Onset    Diabetes Mother     Kidney disease Mother     Other Mother         UTERINE CYST    No Known Problems Father        Past Social history  Social History     Socioeconomic History    Marital status: Single     Spouse name: Not on file    Number of children: 2    Years of education: Not on file    Highest education level: Not on file   Occupational History    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; currently working   Social Needs    Financial resource strain: Not on file    Food insecurity:     Worry: Not on file     Inability: Not on file   [employer] needs:     Medical: Not on file     Non-medical: Not on file   Tobacco Use    Smoking status: Former Smoker    Smokeless tobacco: Former User    Tobacco comment: pt "quit 5 years ago"   Substance and Sexual Activity    Alcohol use: No    Drug use: No    Sexual activity: Not Currently     Comment: SEXUAL ABSTINENCE   Lifestyle    Physical activity:     Days per week: Not on file     Minutes per session: Not on file    Stress: Not on file   Relationships    Social connections:     Talks on phone: Not on file     Gets together: Not on file     Attends Rastafari service: Not on file     Active member of club or organization: Not on file     Attends meetings of clubs or organizations: Not on file     Relationship status: Not on file    Intimate partner violence:     Fear of current or ex partner: Not on file     Emotionally abused: Not on file     Physically abused: Not on file     Forced sexual activity: Not on file   Other Topics Concern    Not on file   Social History Narrative    Caffeine use: 1 cup a day    Does not exercise        Current Medications  Current Outpatient Medications   Medication Sig Dispense Refill    lisinopril (ZESTRIL) 2 5 mg tablet Take 1 tablet (2 5 mg total) by mouth daily 30 tablet 3    metFORMIN (GLUCOPHAGE) 1000 MG tablet Take 0 5 tablets (500 mg total) by mouth 2 (two) times a day with meals 60 tablet 3    clotrimazole-betamethasone (LOTRISONE) 1-0 05 % cream Apply topically 2 (two) times a day 30 g 2     No current facility-administered medications for this visit  Allergies  Allergies   Allergen Reactions    Other      Annotation - [id number]: tilapia fish - swelling of throat/eyes         The following portions of the patient's history were reviewed and updated as appropriate: allergies, current medications, past medical history, past social history, past surgical history and problem list       Vitals  Vitals:    01/15/20 0804   BP: 124/82   BP Location: Left arm   Patient Position: Sitting   Cuff Size: Standard   Pulse: 66   Weight: 85 6 kg (188 lb 12 8 oz)   Height: 5' 5" (1 651 m)         Physical Exam  Constitutional   General appearance: Patient is seated and in no acute distress, well appearing and well nourished  Head and Face   Head and face: Normal     Eyes   Conjunctiva and lids: No erythema, swelling or discharge  Ears, Nose, Mouth, and Throat   Hearing: Normal     Pulmonary   Respiratory effort: No increased work of breathing or signs of respiratory distress  Cardiovascular   Examination of extremities for edema and/or varicosities: Normal     Abdomen   Abdomen: Non-tender, no masses  Genitourinary   Penis uncircumcised, erythematous rash on the retracted foreskin and distal penis  Musculoskeletal   Gait and station: Normal    Skin   Skin and subcutaneous tissue: Warm, dry, and intact  No visible lesions or rashes  Psychiatric   Judgment and insight: Normal  Recent and remote memory:  Normal  Mood and affect: Normal      Results  No results found for this or any previous visit (from the past 1 hour(s))  ]  No results found for: PSA  Lab Results Component Value Date    CALCIUM 8 4 06/02/2019    K 3 4 (L) 06/02/2019    CO2 20 (L) 06/02/2019     06/02/2019    BUN 14 06/02/2019    CREATININE 1 04 06/02/2019     Lab Results   Component Value Date    WBC 11 11 (H) 06/02/2019    HGB 14 0 06/02/2019    HCT 39 7 06/02/2019    MCV 86 06/02/2019     06/02/2019       Orders  No orders of the defined types were placed in this encounter

## 2020-01-21 ENCOUNTER — LAB (OUTPATIENT)
Dept: LAB | Facility: HOSPITAL | Age: 40
End: 2020-01-21
Attending: UROLOGY

## 2020-01-21 DIAGNOSIS — Z12.5 ENCOUNTER FOR PROSTATE CANCER SCREENING: ICD-10-CM

## 2020-01-21 LAB — PSA SERPL-MCNC: 0.5 NG/ML (ref 0–4)

## 2020-01-21 PROCEDURE — G0103 PSA SCREENING: HCPCS

## 2020-01-21 PROCEDURE — 36415 COLL VENOUS BLD VENIPUNCTURE: CPT

## 2020-01-22 ENCOUNTER — TELEPHONE (OUTPATIENT)
Dept: UROLOGY | Facility: CLINIC | Age: 40
End: 2020-01-22

## 2020-01-22 NOTE — TELEPHONE ENCOUNTER
Called and informed patient of test results patient had no additional questions and will follow up as planned   ----- Message from Ginny Yang MD sent at 1/21/2020  5:03 PM EST -----  Please inform patient that the results are normal

## 2020-01-30 NOTE — PROGRESS NOTES
2/4/2020      Chief Complaint   Patient presents with    balanitis       Assessment and Plan    38 y o  male managed by Dr Savanah Tapia     1  Balanitis  - resolved (per report patient deferred examination) with clotrimazole betamethasone, will continue this as needed only      2  Nephrolithiasis s/p right ureteroscopy 5/20/19  - proper hydration was 60 ounces of water daily    Will obtain a KUB in 1 year, if KUB negative and balanitis controlled can FU PRN        History of Present Illness  Myriam Peña is a 38 y o  male here for follow up evaluation of balanitis and nephrolithiasis  The patient was started on clotrimazole betamethasone mid January  He has been using this with resolution of his balanitis  He does have a history of nephrolithiasis status post right ureteroscopy 5/20/2019  This was the patient's 1 and only stone episode  Lung surgery revealed no further stone burden  Review of Systems   Constitutional: Negative for activity change, chills and fever  Gastrointestinal: Negative for abdominal distention and abdominal pain  Musculoskeletal: Negative for back pain and gait problem  Psychiatric/Behavioral: Negative for behavioral problems and confusion         Past Medical History  Past Medical History:   Diagnosis Date    Cardiomegaly     Diabetes mellitus (HCC)        Past Social History  Past Surgical History:   Procedure Laterality Date    EYE SURGERY      FOR DECREASED VISION, PTERYGIUM    OR CYSTO/URETERO W/LITHOTRIPSY &INDWELL STENT INSRT Right 5/28/2019    Procedure: CYSTOSCOPY, URETEROSCOPY, STONE BASKET RETRIEVAL, AND INSERTION STENT URETERAL;  Surgeon: Charmaine Louise MD;  [facility];  Service: Urology     Social History     Tobacco Use   Smoking Status Former Smoker   Smokeless Tobacco Former User   Tobacco Comment    pt "quit 5 years ago"       Past Family History  Family History   Problem Relation Age of Onset    Diabetes Mother     Kidney disease Mother  Other Mother         UTERINE CYST    No Known Problems Father        Past Social history  Social History     Socioeconomic History    Marital status: Single     Spouse name: Not on file    Number of children: 2    Years of education: Not on file    Highest education level: Not on file   Occupational History    Occupation:      Comment: in restaurant; currently working   Social Needs    Financial resource strain: Not on file    Food insecurity:     Worry: Not on file     Inability: Not on file   [employer] needs:     Medical: Not on file     Non-medical: Not on file   Tobacco Use    Smoking status: Former Smoker    Smokeless tobacco: Former User    Tobacco comment: pt "quit 5 years ago"   Substance and Sexual Activity    Alcohol use: No    Drug use: No    Sexual activity: Not Currently     Comment: SEXUAL ABSTINENCE   Lifestyle    Physical activity:     Days per week: Not on file     Minutes per session: Not on file    Stress: Not on file   Relationships    Social connections:     Talks on phone: Not on file     Gets together: Not on file     Attends Latter-day service: Not on file     Active member of club or organization: Not on file     Attends meetings of clubs or organizations: Not on file     Relationship status: Not on file    Intimate partner violence:     Fear of current or ex partner: Not on file     Emotionally abused: Not on file     Physically abused: Not on file     Forced sexual activity: Not on file   Other Topics Concern    Not on file   Social History Narrative    Caffeine use: 1 cup a day    Does not exercise        Current Medications  Current Outpatient Medications   Medication Sig Dispense Refill    clotrimazole-betamethasone (LOTRISONE) 1-0 05 % cream Apply topically 2 (two) times a day 30 g 2    lisinopril (ZESTRIL) 2 5 mg tablet Take 1 tablet (2 5 mg total) by mouth daily 30 tablet 3    metFORMIN (GLUCOPHAGE) 1000 MG tablet Take 0 5 tablets (500 mg total) by mouth 2 (two) times a day with meals 60 tablet 3     No current facility-administered medications for this visit  Allergies  Allergies   Allergen Reactions    Other      Annotation - [id number]: tilapia fish - swelling of throat/eyes         The following portions of the patient's history were reviewed and updated as appropriate: allergies, current medications, past medical history, past social history, past surgical history and problem list       Vitals  Vitals:    02/04/20 0848   BP: 118/80   BP Location: Left arm   Patient Position: Sitting   Cuff Size: Adult   Pulse: 62   Weight: 83 5 kg (184 lb)   Height: 5' 5" (1 651 m)       Physical Exam  Constitutional   General appearance: Patient is seated and in no acute distress, well appearing and well nourished  Head and Face   Head and face: Normal     Eyes   Conjunctiva and lids: No erythema, swelling or discharge  Ears, Nose, Mouth, and Throat   Hearing: Normal     Pulmonary   Respiratory effort: No increased work of breathing or signs of respiratory distress  Cardiovascular   Examination of extremities for edema and/or varicosities: Normal     Abdomen   Abdomen: Non-tender, no masses  Genitourinary   Deferred  Musculoskeletal   Gait and station: Normal     Skin   Skin and subcutaneous tissue: Warm, dry, and intact  No visible lesions or rashes    Psychiatric   Judgment and insight: Normal  Recent and remote memory:  Normal  Mood and affect: Normal      Results  No results found for this or any previous visit (from the past 1 hour(s)) ]  Lab Results   Component Value Date    PSA 0 5 01/21/2020     Lab Results   Component Value Date    CALCIUM 8 4 06/02/2019    K 3 4 (L) 06/02/2019    CO2 20 (L) 06/02/2019     06/02/2019    BUN 14 06/02/2019    CREATININE 1 04 06/02/2019     Lab Results   Component Value Date    WBC 11 11 (H) 06/02/2019    HGB 14 0 06/02/2019    HCT 39 7 06/02/2019    MCV 86 06/02/2019     06/02/2019 Orders  Orders Placed This Encounter   Procedures    XR abdomen 1 view kub     Standing Status:   Future     Standing Expiration Date:   2/4/2024     Scheduling Instructions:      Bring along any outside films relating to this procedure             Order Specific Question:   Reason for Exam:     Answer:   nephrolithiasis

## 2020-02-04 ENCOUNTER — OFFICE VISIT (OUTPATIENT)
Dept: UROLOGY | Facility: MEDICAL CENTER | Age: 40
End: 2020-02-04

## 2020-02-04 VITALS
SYSTOLIC BLOOD PRESSURE: 118 MMHG | HEART RATE: 62 BPM | DIASTOLIC BLOOD PRESSURE: 80 MMHG | WEIGHT: 184 LBS | HEIGHT: 65 IN | BODY MASS INDEX: 30.66 KG/M2

## 2020-02-04 DIAGNOSIS — N20.0 NEPHROLITHIASIS: Primary | ICD-10-CM

## 2020-02-04 PROCEDURE — 99213 OFFICE O/P EST LOW 20 MIN: CPT | Performed by: PHYSICIAN ASSISTANT

## 2020-02-04 PROCEDURE — 3008F BODY MASS INDEX DOCD: CPT | Performed by: PHYSICIAN ASSISTANT

## 2020-02-04 PROCEDURE — 1036F TOBACCO NON-USER: CPT | Performed by: PHYSICIAN ASSISTANT

## 2020-05-06 ENCOUNTER — APPOINTMENT (EMERGENCY)
Dept: RADIOLOGY | Facility: HOSPITAL | Age: 40
DRG: 137 | End: 2020-05-06
Payer: COMMERCIAL

## 2020-05-06 ENCOUNTER — HOSPITAL ENCOUNTER (INPATIENT)
Facility: HOSPITAL | Age: 40
LOS: 2 days | Discharge: HOME | DRG: 137 | End: 2020-05-08
Attending: EMERGENCY MEDICINE | Admitting: INTERNAL MEDICINE
Payer: COMMERCIAL

## 2020-05-06 DIAGNOSIS — J12.82 PNEUMONIA DUE TO COVID-19 VIRUS: ICD-10-CM

## 2020-05-06 DIAGNOSIS — U07.1 COVID-19: ICD-10-CM

## 2020-05-06 DIAGNOSIS — E11.9 TYPE 2 DIABETES MELLITUS WITHOUT COMPLICATION, WITHOUT LONG-TERM CURRENT USE OF INSULIN (HCC): ICD-10-CM

## 2020-05-06 DIAGNOSIS — R06.00 DYSPNEA, UNSPECIFIED TYPE: Primary | ICD-10-CM

## 2020-05-06 DIAGNOSIS — R06.82 TACHYPNEA: ICD-10-CM

## 2020-05-06 DIAGNOSIS — U07.1 PNEUMONIA DUE TO COVID-19 VIRUS: ICD-10-CM

## 2020-05-06 LAB
ABO GROUP BLD: NORMAL
ALBUMIN SERPL BCP-MCNC: 3.5 G/DL (ref 3.5–5)
ALP SERPL-CCNC: 101 U/L (ref 46–116)
ALT SERPL W P-5'-P-CCNC: 108 U/L (ref 12–78)
ANION GAP SERPL CALCULATED.3IONS-SCNC: 7 MMOL/L (ref 4–13)
AST SERPL W P-5'-P-CCNC: 87 U/L (ref 5–45)
BASOPHILS # BLD AUTO: 0.01 THOUSANDS/ΜL (ref 0–0.1)
BASOPHILS NFR BLD AUTO: 0 % (ref 0–1)
BILIRUB SERPL-MCNC: 0.68 MG/DL (ref 0.2–1)
BUN SERPL-MCNC: 9 MG/DL (ref 5–25)
CALCIUM SERPL-MCNC: 8.7 MG/DL (ref 8.3–10.1)
CHLORIDE SERPL-SCNC: 99 MMOL/L (ref 100–108)
CK SERPL-CCNC: 55 U/L (ref 39–308)
CO2 SERPL-SCNC: 26 MMOL/L (ref 21–32)
CREAT SERPL-MCNC: 0.84 MG/DL (ref 0.6–1.3)
CRP SERPL QL: 48.1 MG/L
D DIMER PPP FEU-MCNC: 0.54 UG/ML FEU
EOSINOPHIL # BLD AUTO: 0 THOUSAND/ΜL (ref 0–0.61)
EOSINOPHIL NFR BLD AUTO: 0 % (ref 0–6)
ERYTHROCYTE [DISTWIDTH] IN BLOOD BY AUTOMATED COUNT: 11.7 % (ref 11.6–15.1)
FERRITIN SERPL-MCNC: 726 NG/ML (ref 8–388)
GFR SERPL CREATININE-BSD FRML MDRD: 109 ML/MIN/1.73SQ M
GLUCOSE SERPL-MCNC: 289 MG/DL (ref 65–140)
GLUCOSE SERPL-MCNC: 312 MG/DL (ref 65–140)
HCT VFR BLD AUTO: 42.6 % (ref 36.5–49.3)
HGB BLD-MCNC: 15 G/DL (ref 12–17)
IMM GRANULOCYTES # BLD AUTO: 0.01 THOUSAND/UL (ref 0–0.2)
IMM GRANULOCYTES NFR BLD AUTO: 0 % (ref 0–2)
LYMPHOCYTES # BLD AUTO: 0.96 THOUSANDS/ΜL (ref 0.6–4.47)
LYMPHOCYTES NFR BLD AUTO: 19 % (ref 14–44)
MCH RBC QN AUTO: 30.2 PG (ref 26.8–34.3)
MCHC RBC AUTO-ENTMCNC: 35.2 G/DL (ref 31.4–37.4)
MCV RBC AUTO: 86 FL (ref 82–98)
MONOCYTES # BLD AUTO: 0.38 THOUSAND/ΜL (ref 0.17–1.22)
MONOCYTES NFR BLD AUTO: 8 % (ref 4–12)
NEUTROPHILS # BLD AUTO: 3.59 THOUSANDS/ΜL (ref 1.85–7.62)
NEUTS SEG NFR BLD AUTO: 73 % (ref 43–75)
NRBC BLD AUTO-RTO: 0 /100 WBCS
NT-PROBNP SERPL-MCNC: <5 PG/ML
PLATELET # BLD AUTO: 189 THOUSANDS/UL (ref 149–390)
PMV BLD AUTO: 11.1 FL (ref 8.9–12.7)
POTASSIUM SERPL-SCNC: 3.6 MMOL/L (ref 3.5–5.3)
PROCALCITONIN SERPL-MCNC: 0.08 NG/ML
PROT SERPL-MCNC: 8.2 G/DL (ref 6.4–8.2)
RBC # BLD AUTO: 4.97 MILLION/UL (ref 3.88–5.62)
RH BLD: POSITIVE
SARS-COV-2 RNA RESP QL NAA+PROBE: POSITIVE
SODIUM SERPL-SCNC: 132 MMOL/L (ref 136–145)
TROPONIN I SERPL-MCNC: <0.02 NG/ML
WBC # BLD AUTO: 4.95 THOUSAND/UL (ref 4.31–10.16)

## 2020-05-06 PROCEDURE — 86901 BLOOD TYPING SEROLOGIC RH(D): CPT | Performed by: INTERNAL MEDICINE

## 2020-05-06 PROCEDURE — 86140 C-REACTIVE PROTEIN: CPT | Performed by: INTERNAL MEDICINE

## 2020-05-06 PROCEDURE — 84484 ASSAY OF TROPONIN QUANT: CPT | Performed by: EMERGENCY MEDICINE

## 2020-05-06 PROCEDURE — 85025 COMPLETE CBC W/AUTO DIFF WBC: CPT | Performed by: EMERGENCY MEDICINE

## 2020-05-06 PROCEDURE — 82728 ASSAY OF FERRITIN: CPT | Performed by: INTERNAL MEDICINE

## 2020-05-06 PROCEDURE — 84145 PROCALCITONIN (PCT): CPT | Performed by: INTERNAL MEDICINE

## 2020-05-06 PROCEDURE — 36415 COLL VENOUS BLD VENIPUNCTURE: CPT | Performed by: EMERGENCY MEDICINE

## 2020-05-06 PROCEDURE — 71045 X-RAY EXAM CHEST 1 VIEW: CPT

## 2020-05-06 PROCEDURE — 84484 ASSAY OF TROPONIN QUANT: CPT | Performed by: INTERNAL MEDICINE

## 2020-05-06 PROCEDURE — 86900 BLOOD TYPING SEROLOGIC ABO: CPT | Performed by: INTERNAL MEDICINE

## 2020-05-06 PROCEDURE — 99285 EMERGENCY DEPT VISIT HI MDM: CPT

## 2020-05-06 PROCEDURE — 82948 REAGENT STRIP/BLOOD GLUCOSE: CPT

## 2020-05-06 PROCEDURE — 85379 FIBRIN DEGRADATION QUANT: CPT | Performed by: INTERNAL MEDICINE

## 2020-05-06 PROCEDURE — 93005 ELECTROCARDIOGRAM TRACING: CPT

## 2020-05-06 PROCEDURE — 82550 ASSAY OF CK (CPK): CPT | Performed by: INTERNAL MEDICINE

## 2020-05-06 PROCEDURE — 83880 ASSAY OF NATRIURETIC PEPTIDE: CPT | Performed by: INTERNAL MEDICINE

## 2020-05-06 PROCEDURE — U0003 INFECTIOUS AGENT DETECTION BY NUCLEIC ACID (DNA OR RNA); SEVERE ACUTE RESPIRATORY SYNDROME CORONAVIRUS 2 (SARS-COV-2) (CORONAVIRUS DISEASE [COVID-19]), AMPLIFIED PROBE TECHNIQUE, MAKING USE OF HIGH THROUGHPUT TECHNOLOGIES AS DESCRIBED BY CMS-2020-01-R: HCPCS | Performed by: EMERGENCY MEDICINE

## 2020-05-06 PROCEDURE — 99285 EMERGENCY DEPT VISIT HI MDM: CPT | Performed by: EMERGENCY MEDICINE

## 2020-05-06 PROCEDURE — 80053 COMPREHEN METABOLIC PANEL: CPT | Performed by: EMERGENCY MEDICINE

## 2020-05-06 PROCEDURE — 83520 IMMUNOASSAY QUANT NOS NONAB: CPT | Performed by: INTERNAL MEDICINE

## 2020-05-06 RX ORDER — ATORVASTATIN CALCIUM 40 MG/1
40 TABLET, FILM COATED ORAL
Status: DISCONTINUED | OUTPATIENT
Start: 2020-05-06 | End: 2020-05-08 | Stop reason: HOSPADM

## 2020-05-06 RX ORDER — MULTIVITAMIN/IRON/FOLIC ACID 18MG-0.4MG
1 TABLET ORAL DAILY
Status: DISCONTINUED | OUTPATIENT
Start: 2020-05-14 | End: 2020-05-08 | Stop reason: HOSPADM

## 2020-05-06 RX ORDER — ASCORBIC ACID 500 MG
1000 TABLET ORAL EVERY 12 HOURS SCHEDULED
Status: DISCONTINUED | OUTPATIENT
Start: 2020-05-06 | End: 2020-05-08 | Stop reason: HOSPADM

## 2020-05-06 RX ORDER — ACETAMINOPHEN 325 MG/1
650 TABLET ORAL EVERY 6 HOURS PRN
Status: DISCONTINUED | OUTPATIENT
Start: 2020-05-06 | End: 2020-05-08 | Stop reason: HOSPADM

## 2020-05-06 RX ORDER — HYDROXYCHLOROQUINE SULFATE 200 MG/1
800 TABLET, FILM COATED ORAL EVERY 24 HOURS
Status: COMPLETED | OUTPATIENT
Start: 2020-05-06 | End: 2020-05-06

## 2020-05-06 RX ORDER — MELATONIN
2000 DAILY
Status: DISCONTINUED | OUTPATIENT
Start: 2020-05-07 | End: 2020-05-08 | Stop reason: HOSPADM

## 2020-05-06 RX ORDER — HYDROXYCHLOROQUINE SULFATE 200 MG/1
200 TABLET, FILM COATED ORAL EVERY 12 HOURS
Status: DISCONTINUED | OUTPATIENT
Start: 2020-05-07 | End: 2020-05-08 | Stop reason: HOSPADM

## 2020-05-06 RX ORDER — ZINC SULFATE 50(220)MG
220 CAPSULE ORAL DAILY
Status: DISCONTINUED | OUTPATIENT
Start: 2020-05-07 | End: 2020-05-08 | Stop reason: HOSPADM

## 2020-05-06 RX ORDER — LISINOPRIL 2.5 MG/1
2.5 TABLET ORAL DAILY
Status: DISCONTINUED | OUTPATIENT
Start: 2020-05-07 | End: 2020-05-08 | Stop reason: HOSPADM

## 2020-05-06 RX ORDER — DOXYCYCLINE HYCLATE 100 MG/1
100 CAPSULE ORAL EVERY 12 HOURS
Status: DISCONTINUED | OUTPATIENT
Start: 2020-05-06 | End: 2020-05-07

## 2020-05-06 RX ADMIN — HYDROXYCHLOROQUINE SULFATE 800 MG: 200 TABLET, FILM COATED ORAL at 16:56

## 2020-05-06 RX ADMIN — ATORVASTATIN CALCIUM 40 MG: 40 TABLET, FILM COATED ORAL at 22:35

## 2020-05-06 RX ADMIN — ACETAMINOPHEN 650 MG: 325 TABLET ORAL at 19:48

## 2020-05-06 RX ADMIN — INSULIN LISPRO 4 UNITS: 100 INJECTION, SOLUTION INTRAVENOUS; SUBCUTANEOUS at 22:35

## 2020-05-06 RX ADMIN — OXYCODONE HYDROCHLORIDE AND ACETAMINOPHEN 1000 MG: 500 TABLET ORAL at 22:35

## 2020-05-06 RX ADMIN — CEFTRIAXONE SODIUM 1000 MG: 10 INJECTION, POWDER, FOR SOLUTION INTRAVENOUS at 17:31

## 2020-05-06 RX ADMIN — DOXYCYCLINE 100 MG: 100 CAPSULE ORAL at 16:57

## 2020-05-07 LAB
ANION GAP SERPL CALCULATED.3IONS-SCNC: 9 MMOL/L (ref 4–13)
ATRIAL RATE: 83 BPM
ATRIAL RATE: 85 BPM
ATRIAL RATE: 86 BPM
BASOPHILS # BLD AUTO: 0.01 THOUSANDS/ΜL (ref 0–0.1)
BASOPHILS NFR BLD AUTO: 0 % (ref 0–1)
BUN SERPL-MCNC: 11 MG/DL (ref 5–25)
CALCIUM SERPL-MCNC: 8.6 MG/DL (ref 8.3–10.1)
CHLORIDE SERPL-SCNC: 99 MMOL/L (ref 100–108)
CO2 SERPL-SCNC: 26 MMOL/L (ref 21–32)
CREAT SERPL-MCNC: 0.68 MG/DL (ref 0.6–1.3)
EOSINOPHIL # BLD AUTO: 0 THOUSAND/ΜL (ref 0–0.61)
EOSINOPHIL NFR BLD AUTO: 0 % (ref 0–6)
ERYTHROCYTE [DISTWIDTH] IN BLOOD BY AUTOMATED COUNT: 11.7 % (ref 11.6–15.1)
EST. AVERAGE GLUCOSE BLD GHB EST-MCNC: 260 MG/DL
GFR SERPL CREATININE-BSD FRML MDRD: 119 ML/MIN/1.73SQ M
GLUCOSE SERPL-MCNC: 230 MG/DL (ref 65–140)
GLUCOSE SERPL-MCNC: 248 MG/DL (ref 65–140)
GLUCOSE SERPL-MCNC: 251 MG/DL (ref 65–140)
GLUCOSE SERPL-MCNC: 259 MG/DL (ref 65–140)
GLUCOSE SERPL-MCNC: 263 MG/DL (ref 65–140)
GLUCOSE SERPL-MCNC: 268 MG/DL (ref 65–140)
HBA1C MFR BLD: 10.7 %
HCT VFR BLD AUTO: 40.8 % (ref 36.5–49.3)
HGB BLD-MCNC: 14.2 G/DL (ref 12–17)
IMM GRANULOCYTES # BLD AUTO: 0.03 THOUSAND/UL (ref 0–0.2)
IMM GRANULOCYTES NFR BLD AUTO: 1 % (ref 0–2)
LYMPHOCYTES # BLD AUTO: 1.27 THOUSANDS/ΜL (ref 0.6–4.47)
LYMPHOCYTES NFR BLD AUTO: 20 % (ref 14–44)
MCH RBC QN AUTO: 30.1 PG (ref 26.8–34.3)
MCHC RBC AUTO-ENTMCNC: 34.8 G/DL (ref 31.4–37.4)
MCV RBC AUTO: 86 FL (ref 82–98)
MONOCYTES # BLD AUTO: 0.44 THOUSAND/ΜL (ref 0.17–1.22)
MONOCYTES NFR BLD AUTO: 7 % (ref 4–12)
NEUTROPHILS # BLD AUTO: 4.77 THOUSANDS/ΜL (ref 1.85–7.62)
NEUTS SEG NFR BLD AUTO: 72 % (ref 43–75)
NRBC BLD AUTO-RTO: 0 /100 WBCS
P AXIS: 42 DEGREES
P AXIS: 50 DEGREES
P AXIS: 51 DEGREES
PLATELET # BLD AUTO: 184 THOUSANDS/UL (ref 149–390)
PMV BLD AUTO: 10.8 FL (ref 8.9–12.7)
POTASSIUM SERPL-SCNC: 3.5 MMOL/L (ref 3.5–5.3)
PR INTERVAL: 152 MS
PR INTERVAL: 160 MS
PR INTERVAL: 160 MS
PROCALCITONIN SERPL-MCNC: 0.07 NG/ML
QRS AXIS: 63 DEGREES
QRS AXIS: 65 DEGREES
QRS AXIS: 75 DEGREES
QRSD INTERVAL: 74 MS
QRSD INTERVAL: 76 MS
QRSD INTERVAL: 80 MS
QT INTERVAL: 350 MS
QT INTERVAL: 352 MS
QT INTERVAL: 366 MS
QTC INTERVAL: 413 MS
QTC INTERVAL: 418 MS
QTC INTERVAL: 435 MS
RBC # BLD AUTO: 4.72 MILLION/UL (ref 3.88–5.62)
SODIUM SERPL-SCNC: 134 MMOL/L (ref 136–145)
T WAVE AXIS: 35 DEGREES
T WAVE AXIS: 37 DEGREES
T WAVE AXIS: 51 DEGREES
VENTRICULAR RATE: 83 BPM
VENTRICULAR RATE: 85 BPM
VENTRICULAR RATE: 86 BPM
WBC # BLD AUTO: 6.52 THOUSAND/UL (ref 4.31–10.16)

## 2020-05-07 PROCEDURE — 93010 ELECTROCARDIOGRAM REPORT: CPT | Performed by: INTERNAL MEDICINE

## 2020-05-07 PROCEDURE — 82948 REAGENT STRIP/BLOOD GLUCOSE: CPT

## 2020-05-07 PROCEDURE — 83036 HEMOGLOBIN GLYCOSYLATED A1C: CPT | Performed by: STUDENT IN AN ORGANIZED HEALTH CARE EDUCATION/TRAINING PROGRAM

## 2020-05-07 PROCEDURE — 99223 1ST HOSP IP/OBS HIGH 75: CPT | Performed by: INTERNAL MEDICINE

## 2020-05-07 PROCEDURE — 93005 ELECTROCARDIOGRAM TRACING: CPT

## 2020-05-07 PROCEDURE — 85025 COMPLETE CBC W/AUTO DIFF WBC: CPT | Performed by: INTERNAL MEDICINE

## 2020-05-07 PROCEDURE — NC001 PR NO CHARGE: Performed by: INTERNAL MEDICINE

## 2020-05-07 PROCEDURE — 84145 PROCALCITONIN (PCT): CPT | Performed by: INTERNAL MEDICINE

## 2020-05-07 PROCEDURE — 80048 BASIC METABOLIC PNL TOTAL CA: CPT | Performed by: INTERNAL MEDICINE

## 2020-05-07 RX ORDER — BLOOD SUGAR DIAGNOSTIC
STRIP MISCELLANEOUS
Refills: 0 | Status: CANCELLED | OUTPATIENT
Start: 2020-05-07

## 2020-05-07 RX ORDER — LANCETS
EACH MISCELLANEOUS
Refills: 0 | Status: CANCELLED | OUTPATIENT
Start: 2020-05-07

## 2020-05-07 RX ORDER — INSULIN GLARGINE 100 [IU]/ML
10 INJECTION, SOLUTION SUBCUTANEOUS
Status: DISCONTINUED | OUTPATIENT
Start: 2020-05-07 | End: 2020-05-08 | Stop reason: HOSPADM

## 2020-05-07 RX ORDER — BLOOD-GLUCOSE METER
KIT MISCELLANEOUS
Refills: 0 | Status: CANCELLED | OUTPATIENT
Start: 2020-05-07

## 2020-05-07 RX ADMIN — INSULIN LISPRO 3 UNITS: 100 INJECTION, SOLUTION INTRAVENOUS; SUBCUTANEOUS at 17:34

## 2020-05-07 RX ADMIN — INSULIN LISPRO 3 UNITS: 100 INJECTION, SOLUTION INTRAVENOUS; SUBCUTANEOUS at 12:12

## 2020-05-07 RX ADMIN — INSULIN LISPRO 3 UNITS: 100 INJECTION, SOLUTION INTRAVENOUS; SUBCUTANEOUS at 21:41

## 2020-05-07 RX ADMIN — ZINC SULFATE 220 MG (50 MG) CAPSULE 220 MG: CAPSULE at 09:04

## 2020-05-07 RX ADMIN — HYDROXYCHLOROQUINE SULFATE 200 MG: 200 TABLET, FILM COATED ORAL at 17:34

## 2020-05-07 RX ADMIN — INSULIN GLARGINE 10 UNITS: 100 INJECTION, SOLUTION SUBCUTANEOUS at 21:40

## 2020-05-07 RX ADMIN — OXYCODONE HYDROCHLORIDE AND ACETAMINOPHEN 1000 MG: 500 TABLET ORAL at 09:04

## 2020-05-07 RX ADMIN — LISINOPRIL 2.5 MG: 2.5 TABLET ORAL at 09:04

## 2020-05-07 RX ADMIN — MELATONIN 2000 UNITS: at 09:04

## 2020-05-07 RX ADMIN — ACETAMINOPHEN 650 MG: 325 TABLET ORAL at 16:04

## 2020-05-07 RX ADMIN — OXYCODONE HYDROCHLORIDE AND ACETAMINOPHEN 1000 MG: 500 TABLET ORAL at 20:12

## 2020-05-07 RX ADMIN — INSULIN LISPRO 3 UNITS: 100 INJECTION, SOLUTION INTRAVENOUS; SUBCUTANEOUS at 09:13

## 2020-05-07 RX ADMIN — DOXYCYCLINE 100 MG: 100 CAPSULE ORAL at 06:04

## 2020-05-07 RX ADMIN — ATORVASTATIN CALCIUM 40 MG: 40 TABLET, FILM COATED ORAL at 21:40

## 2020-05-08 VITALS
SYSTOLIC BLOOD PRESSURE: 119 MMHG | BODY MASS INDEX: 30.67 KG/M2 | HEIGHT: 65 IN | WEIGHT: 184.08 LBS | HEART RATE: 87 BPM | RESPIRATION RATE: 20 BRPM | TEMPERATURE: 99.8 F | OXYGEN SATURATION: 95 % | DIASTOLIC BLOOD PRESSURE: 74 MMHG

## 2020-05-08 LAB
ALBUMIN SERPL BCP-MCNC: 3 G/DL (ref 3.5–5)
ALP SERPL-CCNC: 103 U/L (ref 46–116)
ALT SERPL W P-5'-P-CCNC: 88 U/L (ref 12–78)
ANION GAP SERPL CALCULATED.3IONS-SCNC: 9 MMOL/L (ref 4–13)
AST SERPL W P-5'-P-CCNC: 76 U/L (ref 5–45)
ATRIAL RATE: 82 BPM
BASOPHILS # BLD AUTO: 0 THOUSANDS/ΜL (ref 0–0.1)
BASOPHILS NFR BLD AUTO: 0 % (ref 0–1)
BILIRUB SERPL-MCNC: 0.84 MG/DL (ref 0.2–1)
BUN SERPL-MCNC: 9 MG/DL (ref 5–25)
CALCIUM SERPL-MCNC: 9 MG/DL (ref 8.3–10.1)
CHLORIDE SERPL-SCNC: 99 MMOL/L (ref 100–108)
CO2 SERPL-SCNC: 23 MMOL/L (ref 21–32)
CREAT SERPL-MCNC: 0.74 MG/DL (ref 0.6–1.3)
CRP SERPL QL: 99.5 MG/L
D DIMER PPP FEU-MCNC: 0.56 UG/ML FEU
EOSINOPHIL # BLD AUTO: 0 THOUSAND/ΜL (ref 0–0.61)
EOSINOPHIL NFR BLD AUTO: 0 % (ref 0–6)
ERYTHROCYTE [DISTWIDTH] IN BLOOD BY AUTOMATED COUNT: 11.6 % (ref 11.6–15.1)
FERRITIN SERPL-MCNC: 646 NG/ML (ref 8–388)
GFR SERPL CREATININE-BSD FRML MDRD: 115 ML/MIN/1.73SQ M
GLUCOSE SERPL-MCNC: 253 MG/DL (ref 65–140)
GLUCOSE SERPL-MCNC: 254 MG/DL (ref 65–140)
HCT VFR BLD AUTO: 41.8 % (ref 36.5–49.3)
HGB BLD-MCNC: 14.6 G/DL (ref 12–17)
IMM GRANULOCYTES # BLD AUTO: 0.02 THOUSAND/UL (ref 0–0.2)
IMM GRANULOCYTES NFR BLD AUTO: 0 % (ref 0–2)
LYMPHOCYTES # BLD AUTO: 1.05 THOUSANDS/ΜL (ref 0.6–4.47)
LYMPHOCYTES NFR BLD AUTO: 15 % (ref 14–44)
MCH RBC QN AUTO: 29.6 PG (ref 26.8–34.3)
MCHC RBC AUTO-ENTMCNC: 34.9 G/DL (ref 31.4–37.4)
MCV RBC AUTO: 85 FL (ref 82–98)
MONOCYTES # BLD AUTO: 0.36 THOUSAND/ΜL (ref 0.17–1.22)
MONOCYTES NFR BLD AUTO: 5 % (ref 4–12)
NEUTROPHILS # BLD AUTO: 5.72 THOUSANDS/ΜL (ref 1.85–7.62)
NEUTS SEG NFR BLD AUTO: 80 % (ref 43–75)
NRBC BLD AUTO-RTO: 0 /100 WBCS
P AXIS: 41 DEGREES
PLATELET # BLD AUTO: 217 THOUSANDS/UL (ref 149–390)
PMV BLD AUTO: 10.5 FL (ref 8.9–12.7)
POTASSIUM SERPL-SCNC: 3.4 MMOL/L (ref 3.5–5.3)
PR INTERVAL: 158 MS
PROT SERPL-MCNC: 8 G/DL (ref 6.4–8.2)
QRS AXIS: 48 DEGREES
QRSD INTERVAL: 84 MS
QT INTERVAL: 368 MS
QTC INTERVAL: 429 MS
RBC # BLD AUTO: 4.93 MILLION/UL (ref 3.88–5.62)
SODIUM SERPL-SCNC: 131 MMOL/L (ref 136–145)
T WAVE AXIS: 24 DEGREES
TSH SERPL DL<=0.05 MIU/L-ACNC: 2.85 UIU/ML (ref 0.36–3.74)
VENTRICULAR RATE: 82 BPM
WBC # BLD AUTO: 7.15 THOUSAND/UL (ref 4.31–10.16)

## 2020-05-08 PROCEDURE — 93005 ELECTROCARDIOGRAM TRACING: CPT

## 2020-05-08 PROCEDURE — 82728 ASSAY OF FERRITIN: CPT | Performed by: STUDENT IN AN ORGANIZED HEALTH CARE EDUCATION/TRAINING PROGRAM

## 2020-05-08 PROCEDURE — NC001 PR NO CHARGE: Performed by: INTERNAL MEDICINE

## 2020-05-08 PROCEDURE — 99239 HOSP IP/OBS DSCHRG MGMT >30: CPT | Performed by: INTERNAL MEDICINE

## 2020-05-08 PROCEDURE — 80053 COMPREHEN METABOLIC PANEL: CPT | Performed by: STUDENT IN AN ORGANIZED HEALTH CARE EDUCATION/TRAINING PROGRAM

## 2020-05-08 PROCEDURE — 86140 C-REACTIVE PROTEIN: CPT | Performed by: STUDENT IN AN ORGANIZED HEALTH CARE EDUCATION/TRAINING PROGRAM

## 2020-05-08 PROCEDURE — 93010 ELECTROCARDIOGRAM REPORT: CPT | Performed by: INTERNAL MEDICINE

## 2020-05-08 PROCEDURE — 82948 REAGENT STRIP/BLOOD GLUCOSE: CPT

## 2020-05-08 PROCEDURE — 84443 ASSAY THYROID STIM HORMONE: CPT | Performed by: STUDENT IN AN ORGANIZED HEALTH CARE EDUCATION/TRAINING PROGRAM

## 2020-05-08 PROCEDURE — 85379 FIBRIN DEGRADATION QUANT: CPT | Performed by: STUDENT IN AN ORGANIZED HEALTH CARE EDUCATION/TRAINING PROGRAM

## 2020-05-08 PROCEDURE — 85025 COMPLETE CBC W/AUTO DIFF WBC: CPT | Performed by: STUDENT IN AN ORGANIZED HEALTH CARE EDUCATION/TRAINING PROGRAM

## 2020-05-08 RX ORDER — ATORVASTATIN CALCIUM 40 MG/1
40 TABLET, FILM COATED ORAL
Qty: 13 TABLET | Refills: 0 | Status: SHIPPED | OUTPATIENT
Start: 2020-05-08 | End: 2020-05-13

## 2020-05-08 RX ORDER — ZINC SULFATE 50(220)MG
220 CAPSULE ORAL DAILY
Qty: 6 CAPSULE | Refills: 0 | Status: SHIPPED | OUTPATIENT
Start: 2020-05-08 | End: 2020-06-16

## 2020-05-08 RX ORDER — HYDROXYCHLOROQUINE SULFATE 200 MG/1
200 TABLET, FILM COATED ORAL EVERY 12 HOURS
Qty: 10 TABLET | Refills: 0 | Status: SHIPPED | OUTPATIENT
Start: 2020-05-08 | End: 2020-06-16

## 2020-05-08 RX ORDER — MELATONIN
2000 DAILY
Qty: 14 TABLET | Refills: 0 | Status: SHIPPED | OUTPATIENT
Start: 2020-05-08 | End: 2020-06-16

## 2020-05-08 RX ORDER — SYRINGE AND NEEDLE,INSULIN,1ML
SYRINGE, EMPTY DISPOSABLE MISCELLANEOUS
Qty: 100 EACH | Refills: 2 | Status: SHIPPED | OUTPATIENT
Start: 2020-05-08 | End: 2021-10-19 | Stop reason: SDUPTHER

## 2020-05-08 RX ORDER — POTASSIUM CHLORIDE 20 MEQ/1
40 TABLET, EXTENDED RELEASE ORAL ONCE
Status: COMPLETED | OUTPATIENT
Start: 2020-05-08 | End: 2020-05-08

## 2020-05-08 RX ADMIN — POTASSIUM CHLORIDE 40 MEQ: 1500 TABLET, EXTENDED RELEASE ORAL at 08:21

## 2020-05-08 RX ADMIN — OXYCODONE HYDROCHLORIDE AND ACETAMINOPHEN 1000 MG: 500 TABLET ORAL at 07:40

## 2020-05-08 RX ADMIN — ZINC SULFATE 220 MG (50 MG) CAPSULE 220 MG: CAPSULE at 07:40

## 2020-05-08 RX ADMIN — MELATONIN 2000 UNITS: at 07:41

## 2020-05-08 RX ADMIN — LISINOPRIL 2.5 MG: 2.5 TABLET ORAL at 07:40

## 2020-05-08 RX ADMIN — INSULIN LISPRO 3 UNITS: 100 INJECTION, SOLUTION INTRAVENOUS; SUBCUTANEOUS at 08:20

## 2020-05-08 RX ADMIN — HYDROXYCHLOROQUINE SULFATE 200 MG: 200 TABLET, FILM COATED ORAL at 05:30

## 2020-05-08 RX ADMIN — INSULIN LISPRO 3 UNITS: 100 INJECTION, SOLUTION INTRAVENOUS; SUBCUTANEOUS at 12:14

## 2020-05-09 LAB — IL6 SERPL-MCNC: 57.5 PG/ML (ref 0–15.5)

## 2020-05-11 ENCOUNTER — TRANSITIONAL CARE MANAGEMENT (OUTPATIENT)
Dept: INTERNAL MEDICINE CLINIC | Facility: CLINIC | Age: 40
End: 2020-05-11

## 2020-05-12 ENCOUNTER — TELEMEDICINE (OUTPATIENT)
Dept: INTERNAL MEDICINE CLINIC | Facility: CLINIC | Age: 40
End: 2020-05-12

## 2020-05-12 DIAGNOSIS — U07.1 PNEUMONIA DUE TO COVID-19 VIRUS: Primary | ICD-10-CM

## 2020-05-12 DIAGNOSIS — J12.82 PNEUMONIA DUE TO COVID-19 VIRUS: Primary | ICD-10-CM

## 2020-05-12 DIAGNOSIS — E08.21 DIABETES MELLITUS DUE TO UNDERLYING CONDITION WITH DIABETIC NEPHROPATHY, WITHOUT LONG-TERM CURRENT USE OF INSULIN (HCC): ICD-10-CM

## 2020-05-12 PROCEDURE — G2012 BRIEF CHECK IN BY MD/QHP: HCPCS | Performed by: INTERNAL MEDICINE

## 2020-05-12 RX ORDER — LISINOPRIL 2.5 MG/1
2.5 TABLET ORAL DAILY
Qty: 30 TABLET | Refills: 3 | Status: SHIPPED | OUTPATIENT
Start: 2020-05-12 | End: 2020-06-16 | Stop reason: SDUPTHER

## 2020-05-13 ENCOUNTER — TELEMEDICINE (OUTPATIENT)
Dept: INTERNAL MEDICINE CLINIC | Facility: CLINIC | Age: 40
End: 2020-05-13

## 2020-05-13 DIAGNOSIS — U07.1 COVID-19: ICD-10-CM

## 2020-05-13 DIAGNOSIS — U07.1 PNEUMONIA DUE TO COVID-19 VIRUS: ICD-10-CM

## 2020-05-13 DIAGNOSIS — J12.82 PNEUMONIA DUE TO COVID-19 VIRUS: ICD-10-CM

## 2020-05-13 PROCEDURE — 99496 TRANSJ CARE MGMT HIGH F2F 7D: CPT | Performed by: INTERNAL MEDICINE

## 2020-05-13 RX ORDER — ATORVASTATIN CALCIUM 40 MG/1
20 TABLET, FILM COATED ORAL
Qty: 30 TABLET | Refills: 3 | Status: SHIPPED | OUTPATIENT
Start: 2020-05-13 | End: 2020-06-16 | Stop reason: SDUPTHER

## 2020-05-13 RX ORDER — ATORVASTATIN CALCIUM 40 MG/1
20 TABLET, FILM COATED ORAL
Qty: 30 TABLET | Refills: 3 | Status: SHIPPED | OUTPATIENT
Start: 2020-05-13 | End: 2020-05-13

## 2020-05-15 ENCOUNTER — TELEMEDICINE (OUTPATIENT)
Dept: INTERNAL MEDICINE CLINIC | Facility: CLINIC | Age: 40
End: 2020-05-15

## 2020-05-15 DIAGNOSIS — U07.1 PNEUMONIA DUE TO COVID-19 VIRUS: Primary | ICD-10-CM

## 2020-05-15 DIAGNOSIS — J12.82 PNEUMONIA DUE TO COVID-19 VIRUS: Primary | ICD-10-CM

## 2020-05-15 PROCEDURE — G2012 BRIEF CHECK IN BY MD/QHP: HCPCS | Performed by: INTERNAL MEDICINE

## 2020-05-19 LAB
ATRIAL RATE: 82 BPM
P AXIS: 41 DEGREES
PR INTERVAL: 158 MS
QRS AXIS: 48 DEGREES
QRSD INTERVAL: 84 MS
QT INTERVAL: 368 MS
QTC INTERVAL: 429 MS
T WAVE AXIS: 24 DEGREES
VENTRICULAR RATE: 82 BPM

## 2020-05-19 PROCEDURE — 93010 ELECTROCARDIOGRAM REPORT: CPT | Performed by: INTERNAL MEDICINE

## 2020-06-15 ENCOUNTER — TELEPHONE (OUTPATIENT)
Dept: INTERNAL MEDICINE CLINIC | Facility: CLINIC | Age: 40
End: 2020-06-15

## 2020-06-16 ENCOUNTER — OFFICE VISIT (OUTPATIENT)
Dept: INTERNAL MEDICINE CLINIC | Facility: CLINIC | Age: 40
End: 2020-06-16

## 2020-06-16 VITALS
DIASTOLIC BLOOD PRESSURE: 70 MMHG | BODY MASS INDEX: 29.02 KG/M2 | HEIGHT: 65 IN | SYSTOLIC BLOOD PRESSURE: 124 MMHG | WEIGHT: 174.16 LBS | TEMPERATURE: 97 F | HEART RATE: 84 BPM

## 2020-06-16 DIAGNOSIS — E08.21 DIABETES MELLITUS DUE TO UNDERLYING CONDITION WITH DIABETIC NEPHROPATHY, WITHOUT LONG-TERM CURRENT USE OF INSULIN (HCC): ICD-10-CM

## 2020-06-16 DIAGNOSIS — U07.1 COVID-19: ICD-10-CM

## 2020-06-16 DIAGNOSIS — J12.82 PNEUMONIA DUE TO COVID-19 VIRUS: ICD-10-CM

## 2020-06-16 DIAGNOSIS — U07.1 PNEUMONIA DUE TO COVID-19 VIRUS: ICD-10-CM

## 2020-06-16 DIAGNOSIS — E11.9 TYPE 2 DIABETES MELLITUS WITHOUT COMPLICATION, WITHOUT LONG-TERM CURRENT USE OF INSULIN (HCC): Primary | ICD-10-CM

## 2020-06-16 PROCEDURE — 3008F BODY MASS INDEX DOCD: CPT | Performed by: INTERNAL MEDICINE

## 2020-06-16 PROCEDURE — 99213 OFFICE O/P EST LOW 20 MIN: CPT | Performed by: INTERNAL MEDICINE

## 2020-06-16 PROCEDURE — 1036F TOBACCO NON-USER: CPT | Performed by: INTERNAL MEDICINE

## 2020-06-16 PROCEDURE — 1111F DSCHRG MED/CURRENT MED MERGE: CPT | Performed by: INTERNAL MEDICINE

## 2020-06-16 RX ORDER — LISINOPRIL 2.5 MG/1
2.5 TABLET ORAL DAILY
Qty: 30 TABLET | Refills: 3 | Status: SHIPPED | OUTPATIENT
Start: 2020-06-16 | End: 2021-01-05

## 2020-06-16 RX ORDER — ATORVASTATIN CALCIUM 40 MG/1
20 TABLET, FILM COATED ORAL
Qty: 30 TABLET | Refills: 3 | Status: SHIPPED | OUTPATIENT
Start: 2020-06-16 | End: 2021-10-19 | Stop reason: ALTCHOICE

## 2020-09-30 ENCOUNTER — PATIENT OUTREACH (OUTPATIENT)
Dept: INTERNAL MEDICINE CLINIC | Facility: CLINIC | Age: 40
End: 2020-09-30

## 2020-09-30 ENCOUNTER — OFFICE VISIT (OUTPATIENT)
Dept: INTERNAL MEDICINE CLINIC | Facility: CLINIC | Age: 40
End: 2020-09-30

## 2020-09-30 VITALS
SYSTOLIC BLOOD PRESSURE: 118 MMHG | BODY MASS INDEX: 30.49 KG/M2 | WEIGHT: 182.98 LBS | HEART RATE: 69 BPM | DIASTOLIC BLOOD PRESSURE: 84 MMHG | OXYGEN SATURATION: 98 % | HEIGHT: 65 IN | TEMPERATURE: 96 F

## 2020-09-30 DIAGNOSIS — E11.9 TYPE 2 DIABETES MELLITUS WITHOUT COMPLICATION, WITHOUT LONG-TERM CURRENT USE OF INSULIN (HCC): Primary | ICD-10-CM

## 2020-09-30 DIAGNOSIS — N48.1 BALANITIS: ICD-10-CM

## 2020-09-30 DIAGNOSIS — Z23 NEED FOR INFLUENZA VACCINATION: ICD-10-CM

## 2020-09-30 DIAGNOSIS — Z71.89 COMPLEX CARE COORDINATION: Primary | ICD-10-CM

## 2020-09-30 PROCEDURE — 99213 OFFICE O/P EST LOW 20 MIN: CPT | Performed by: INTERNAL MEDICINE

## 2020-09-30 PROCEDURE — 90682 RIV4 VACC RECOMBINANT DNA IM: CPT | Performed by: INTERNAL MEDICINE

## 2020-09-30 PROCEDURE — 90471 IMMUNIZATION ADMIN: CPT | Performed by: INTERNAL MEDICINE

## 2020-09-30 RX ORDER — CLOTRIMAZOLE AND BETAMETHASONE DIPROPIONATE 10; .64 MG/G; MG/G
CREAM TOPICAL 2 TIMES DAILY
Qty: 30 G | Refills: 2 | Status: SHIPPED | OUTPATIENT
Start: 2020-09-30 | End: 2022-06-08

## 2020-09-30 RX ORDER — HUMAN INSULIN 100 [IU]/ML
15 INJECTION, SUSPENSION SUBCUTANEOUS
Qty: 10 ML | Refills: 3 | Status: SHIPPED | OUTPATIENT
Start: 2020-09-30 | End: 2020-10-01 | Stop reason: SDUPTHER

## 2020-09-30 NOTE — PROGRESS NOTES
Outpatient Care Management Note:    Patient at PCP office today to follow up with Dr Bindu Pineda requested for nurse care manager and  to see patient as PCP has concern that patient has not been adherent with him medication including insulin and concern for lack of financial resources  Met with patient along with  Emely Amor  Patient is [place] speak and used [employer]  # [id number]  I explained my role to patient and reason we are seeing him was explained  Patient is not a citizen and unable to get ongoing medical assistance  Patient does work part-time  Patient lives with his brother and a friend in an apartment  Patient does not drive and relies on friends to take him to work and to places  Patient is on atorvastatin, lisinopril, metformin, and insulin (Relion Novolon 70/30) was prescrbed today for patient to take 15 units twice a day  Patient not eligible for assistance programs for insulin  Patient made aware [facility] would be the most affordable to get his insulin at about $25 a vial  Patient also made aware of [employer] to check cash prices of medications at pharmacies  Patient did show us a [facility] discount prescription card he has  Insulin was sent to [facility] and I requested Dr Pineda to send it electronically to [facility] on [address] added to chart  Patient reports may still use [facility] to get his pills  Patient was asked for a refill on clotrimazole cream  Patient had box with him and made him aware 2 refills left at [facility]  Per chart PCP did send new script to pharmacy  I did make patient aware with [facility] insulin no prescription is needed and when he is running low can go to [facility] and request more  I did review with patient where to inject insulin, rotating sites, and cleaning site before injecting  Patient reports when he was in the hospital last the nurses educated him on this   Patient has working glucometer and is checking blood sugars twice a day and keeping a log  Does not have log with him today and requested he bring to next appointment  I did remind patient that taking his medication as prescribed, checking his blood sugars and keeping a log and coming to his appointments will help to better manage his diabetes along with daily exercise and eating a healthy diet  Patient does need to follow up with our Christine Mcdaniel and he needs to apply again for financial assistance with [employer]  Patient previously had sliding scale fee  Patient was walked over to the financial counselors by  so they can tell patient when information is needed  Patient does not have any further questions or concerns for me at this time  Patient encouraged to call PCP office with further questions or concerns  This was a one time outreach

## 2020-09-30 NOTE — PROGRESS NOTES
AMBREEN has met with pt along with Yimi Turcios RN/CM as per MD request to assist with insurance and obtaining medications  SW spoke with pt via  ID # [id number]  Pt is not a citizen and not eligible for on going [place] or [place]  Pt resides with a brother and friend  His friend helps him get to work and places as needed  Pt unfortunately not eligible for the Indigent insulin Program    He ia already getting his Insulin through [facility] which should be his most affordable option  Pt relates he does get his Insulin there and will continue  He also had their discount card  AMBREEN has referred him to our dscovered and has willian him to their office this date  He had their assistance in the past but needs to renew it  Pt to f/u with Simona Tripp next Tuesday  She spoke to [place] and told him what to bring in  Pt appears to have a low literacy level or Language barrier  Yimi Turcios RN did clearly review with pt how to get renewals  Pt encouraged to f/u wit AMBREEN if needed

## 2020-09-30 NOTE — PROGRESS NOTES
INTERNAL MEDICINE FOLLOW-UP OFFICE VISIT  [facility]  [address] [place] [address]    NAME: Benjie Rachel  AGE: 38 y o  SEX: male    DATE OF ENCOUNTER: 9/30/2020    Assessment and Plan     DM: Poor compliance with prescribed medications  Patient reports not taking his insulin in over one month as he did not know he could call for refills  Reports compliance with other related meds, but Adherence data suggest otherwise  Concern for lack of financial resources as a potential cause of his non compliance    -Continue DM medications as prescribed, will refill insulin  -A1c ordered  -Referral to ophthalmology   -Asked patient to bring in BG logs to next appointment    -Will consult social work care management program for further assistance  Dysuria: Intermittent over the past couple months  Without frequency, urgency, urethral discharge, or signs of systemic infection  Patient denies sexual activity within the past two years  -UA  -GC/Chlamydia      Orders Placed This Encounter   Procedures    Chlamydia/GC amplified DNA by PCR    influenza vaccine, quadrivalent, recombinant, PF, 0 5 mL, for patients 18 yr+ ([id number])    Hemoglobin A1C    Urinalysis with microscopic    Ambulatory referral to Ophthalmology    Ambulatory referral to social work care management program           Chief Complaint     Chief Complaint   Patient presents with    Follow-up       History of Present Illness     HPI     38year old male presents for further care  He has poorly controlled DM2 and has not been taking his insulin for the past 1+ months reporting that he was unaware he could have a refill called in  He reports otherwise good compliance with related meds including metformin, lisinopril, lipitor though compliance date suggest otherwise  He did not bring in written BG logs to this appointment  Does report his BG was 128 this mourning       Patient reports occasional dysuria occurring over the last couple months  Denies frequency, urgency, urethral discharge, recent sexual activity, or any other symptoms of infection  Reports that the last episode was 1 wk ago  The following portions of the patient's history were reviewed and updated as appropriate: allergies, current medications, past family history, past medical history, past social history, past surgical history and problem list     Review of Systems     Review of Systems   Constitutional: Negative for activity change, appetite change, chills, diaphoresis, fatigue and fever  Respiratory: Negative for apnea, cough, choking, chest tightness, shortness of breath, wheezing and stridor  Cardiovascular: Negative for chest pain, palpitations and leg swelling  Gastrointestinal: Negative for abdominal distention, abdominal pain, anal bleeding, blood in stool, constipation, diarrhea, nausea, rectal pain and vomiting  Genitourinary: Positive for dysuria  Negative for decreased urine volume, difficulty urinating, discharge, enuresis, flank pain, frequency, genital sores, hematuria, penile pain, penile swelling, scrotal swelling, testicular pain and urgency  Musculoskeletal: Negative for arthralgias, back pain, gait problem, joint swelling, myalgias, neck pain and neck stiffness  Skin: Negative for color change, pallor, rash and wound  Neurological: Negative for dizziness, seizures, facial asymmetry, light-headedness, numbness and headaches  Hematological: Negative for adenopathy  Does not bruise/bleed easily  Psychiatric/Behavioral: Negative for agitation, behavioral problems, confusion and decreased concentration         Active Problem List     Patient Active Problem List   Diagnosis    Acute bilateral low back pain without sciatica    Decreased vision    Dyspepsia    Impaired fasting blood sugar    Overweight    Proteinuria    Pterygium of left eye    Subclinical hypothyroidism    Symptoms involving urinary system    Dizziness    Type 2 diabetes mellitus without complication, without long-term current use of insulin (HCC)    Diabetes mellitus due to underlying condition with diabetic nephropathy, without long-term current use of insulin (HCC)    Viral URI with cough    Status post cystoscopy with ureteral stent placement    Pneumonia due to COVID-19 virus       Objective     /84 (BP Location: Left arm, Patient Position: Sitting, Cuff Size: Standard)   Pulse 69   Temp (!) 96 °F (35 6 °C) (Temporal)   Ht 5' 5" (1 651 m)   Wt 83 kg (182 lb 15 7 oz)   SpO2 98%   BMI 30 45 kg/m²     Physical Exam  Constitutional:       Appearance: Normal appearance  HENT:      Head: Normocephalic and atraumatic  Cardiovascular:      Rate and Rhythm: Normal rate and regular rhythm  Pulses: Normal pulses  Heart sounds: Normal heart sounds  No murmur  No friction rub  No gallop  Pulmonary:      Effort: Pulmonary effort is normal  No respiratory distress  Breath sounds: Normal breath sounds  No stridor  No wheezing or rhonchi  Abdominal:      General: Abdomen is flat  Bowel sounds are normal  There is no distension  Palpations: Abdomen is soft  There is no mass  Tenderness: There is no abdominal tenderness  Hernia: No hernia is present  Musculoskeletal: Normal range of motion  General: No swelling, tenderness, deformity or signs of injury  Skin:     General: Skin is warm and dry  Coloration: Skin is not jaundiced or pale  Findings: No bruising or erythema  Neurological:      General: No focal deficit present  Mental Status: He is alert           Pertinent Laboratory/Diagnostic Studies:  CBC:   Lab Results   Component Value Date/Time    WBC 7 15 05/08/2020 07:06 AM    RBC 4 93 05/08/2020 07:06 AM    HGB 14 6 05/08/2020 07:06 AM    HCT 41 8 05/08/2020 07:06 AM    MCV 85 05/08/2020 07:06 AM    MCH 29 6 05/08/2020 07:06 AM    MCHC 34 9 05/08/2020 07:06 AM RDW 11 6 05/08/2020 07:06 AM    MPV 10 5 05/08/2020 07:06 AM     05/08/2020 07:06 AM    NRBC 0 05/08/2020 07:06 AM    NEUTOPHILPCT 80 (H) 05/08/2020 07:06 AM    LYMPHOPCT 15 05/08/2020 07:06 AM    MONOPCT 5 05/08/2020 07:06 AM    EOSPCT 0 05/08/2020 07:06 AM    BASOPCT 0 05/08/2020 07:06 AM    NEUTROABS 5 72 05/08/2020 07:06 AM    LYMPHSABS 1 05 05/08/2020 07:06 AM    MONOSABS 0 36 05/08/2020 07:06 AM    EOSABS 0 00 05/08/2020 07:06 AM     Chemistry Profile:   Lab Results   Component Value Date/Time    K 3 4 (L) 05/08/2020 07:06 AM    CL 99 (L) 05/08/2020 07:06 AM    CO2 23 05/08/2020 07:06 AM    BUN 9 05/08/2020 07:06 AM    CREATININE 0 74 05/08/2020 07:06 AM    GLUC 253 (H) 05/08/2020 07:06 AM    GLUF 117 (H) 05/28/2019 05:17 AM    CALCIUM 9 0 05/08/2020 07:06 AM    AST 76 (H) 05/08/2020 07:06 AM    ALT 88 (H) 05/08/2020 07:06 AM    ALKPHOS 103 05/08/2020 07:06 AM    EGFR 115 05/08/2020 07:06 AM     CBC:   Results from Last 12 Months   Lab Units 05/08/20  0706   WBC Thousand/uL 7 15   RBC Million/uL 4 93   HEMOGLOBIN g/dL 14 6   HEMATOCRIT % 41 8   MCV fL 85   MCH pg 29 6   MCHC g/dL 34 9   RDW % 11 6   MPV fL 10 5   PLATELETS Thousands/uL 217   NRBC AUTO /100 WBCs 0   NEUTROS PCT % 80*   LYMPHS PCT % 15   MONOS PCT % 5   EOS PCT % 0   BASOS PCT % 0   NEUTROS ABS Thousands/µL 5 72   LYMPHS ABS Thousands/µL 1 05   MONOS ABS Thousand/µL 0 36   EOS ABS Thousand/µL 0 00     Chemistry Profile:   Results from Last 12 Months   Lab Units 05/08/20  0706   POTASSIUM mmol/L 3 4*   CHLORIDE mmol/L 99*   CO2 mmol/L 23   BUN mg/dL 9   CREATININE mg/dL 0 74   GLUCOSE RANDOM mg/dL 253*   CALCIUM mg/dL 9 0   AST U/L 76*   ALT U/L 88*   ALK PHOS U/L 103   EGFR ml/min/1 73sq m 115       Current Medications     Current Outpatient Medications:     atorvastatin (LIPITOR) 40 mg tablet, Take 0 5 tablets (20 mg total) by mouth daily at bedtime, Disp: 30 tablet, Rfl: 3    clotrimazole-betamethasone (LOTRISONE) 1-0 05 % cream, Apply topically 2 (two) times a day, Disp: 30 g, Rfl: 2    INS SYRINGE/NEEDLE 1CC/28G 28G X 1/2" 1 ML MISC, by Does not apply route 2 (two) times a day, Disp: 100 each, Rfl: 2    lisinopril (ZESTRIL) 2 5 mg tablet, Take 1 tablet (2 5 mg total) by mouth daily, Disp: 30 tablet, Rfl: 3    metFORMIN (GLUCOPHAGE) 1000 MG tablet, Take 0 5 tablets (500 mg total) by mouth 2 (two) times a day with meals, Disp: 60 tablet, Rfl: 3    insulin NPH-insulin regular (NovoLIN 70/30 ReliOn) 100 units/mL subcutaneous injection, Inject 15 Units under the skin 2 (two) times a day before meals, Disp: 10 mL, Rfl: 3    Health Maintenance     Health Maintenance   Topic Date Due    Pneumococcal Vaccine: Pediatrics (0 to 5 Years) and At-Risk Patients (6 to 59 Years) (1 of 1 - PPSV23) 01/01/1985    DM Eye Exam  01/01/1989    BMI: Followup Plan  01/01/1997    Annual Physical  01/01/1997    DTaP,Tdap,and Td Vaccines (1 - Tdap) 01/01/2000    Diabetic Foot Exam  03/20/2020    Influenza Vaccine  07/01/2020    HEMOGLOBIN A1C  11/07/2020    Depression Screening PHQ  06/16/2021    BMI: Adult  09/30/2021    HIV Screening  Completed    HIB Vaccine  Aged Out    Hepatitis B Vaccine  Aged Out    IPV Vaccine  Aged Out    Hepatitis A Vaccine  Aged Out    Meningococcal ACWY Vaccine  Aged Out    HPV Vaccine  Aged [employer] History   Administered Date(s) Administered    Influenza, recombinant, quadrivalent,injectable, preservative free 09/30/2020       Harper End  9/30/2020 9:12 AM

## 2020-10-01 DIAGNOSIS — E11.9 TYPE 2 DIABETES MELLITUS WITHOUT COMPLICATION, WITHOUT LONG-TERM CURRENT USE OF INSULIN (HCC): ICD-10-CM

## 2020-10-01 RX ORDER — HUMAN INSULIN 100 [IU]/ML
15 INJECTION, SUSPENSION SUBCUTANEOUS
Qty: 10 ML | Refills: 3 | Status: SHIPPED | OUTPATIENT
Start: 2020-10-01 | End: 2020-10-01 | Stop reason: SDUPTHER

## 2020-10-01 RX ORDER — HUMAN INSULIN 100 [IU]/ML
15 INJECTION, SUSPENSION SUBCUTANEOUS
Qty: 10 ML | Refills: 3 | Status: SHIPPED | OUTPATIENT
Start: 2020-10-01 | End: 2021-10-19 | Stop reason: SDUPTHER

## 2020-10-26 DIAGNOSIS — J12.82 PNEUMONIA DUE TO COVID-19 VIRUS: ICD-10-CM

## 2020-10-26 DIAGNOSIS — U07.1 PNEUMONIA DUE TO COVID-19 VIRUS: ICD-10-CM

## 2020-10-26 DIAGNOSIS — U07.1 COVID-19: ICD-10-CM

## 2020-11-04 RX ORDER — ATORVASTATIN CALCIUM 20 MG/1
TABLET, FILM COATED ORAL
Qty: 30 TABLET | Refills: 3 | Status: SHIPPED | OUTPATIENT
Start: 2020-11-04 | End: 2021-06-17

## 2020-11-18 LAB
LEFT EYE DIABETIC RETINOPATHY: NORMAL
RIGHT EYE DIABETIC RETINOPATHY: NORMAL

## 2020-12-31 DIAGNOSIS — E08.21 DIABETES MELLITUS DUE TO UNDERLYING CONDITION WITH DIABETIC NEPHROPATHY, WITHOUT LONG-TERM CURRENT USE OF INSULIN (HCC): ICD-10-CM

## 2020-12-31 DIAGNOSIS — E11.9 TYPE 2 DIABETES MELLITUS WITHOUT COMPLICATION, WITHOUT LONG-TERM CURRENT USE OF INSULIN (HCC): ICD-10-CM

## 2021-01-05 RX ORDER — LISINOPRIL 2.5 MG/1
TABLET ORAL
Qty: 30 TABLET | Refills: 3 | Status: SHIPPED | OUTPATIENT
Start: 2021-01-05 | End: 2021-05-23

## 2021-01-13 ENCOUNTER — OFFICE VISIT (OUTPATIENT)
Dept: INTERNAL MEDICINE CLINIC | Facility: CLINIC | Age: 41
End: 2021-01-13

## 2021-01-13 VITALS
HEIGHT: 66 IN | DIASTOLIC BLOOD PRESSURE: 79 MMHG | TEMPERATURE: 97.3 F | BODY MASS INDEX: 30.63 KG/M2 | HEART RATE: 66 BPM | SYSTOLIC BLOOD PRESSURE: 121 MMHG | WEIGHT: 190.6 LBS

## 2021-01-13 DIAGNOSIS — Z23 NEED FOR PNEUMOCOCCAL VACCINE: ICD-10-CM

## 2021-01-13 DIAGNOSIS — E11.9 TYPE 2 DIABETES MELLITUS WITHOUT COMPLICATION, WITHOUT LONG-TERM CURRENT USE OF INSULIN (HCC): Primary | ICD-10-CM

## 2021-01-13 DIAGNOSIS — Z23 NEED FOR TETANUS, DIPHTHERIA, AND ACELLULAR PERTUSSIS (TDAP) VACCINE: ICD-10-CM

## 2021-01-13 PROBLEM — E03.8 SUBCLINICAL HYPOTHYROIDISM: Status: RESOLVED | Noted: 2017-10-12 | Resolved: 2021-01-13

## 2021-01-13 LAB — SL AMB POCT HEMOGLOBIN AIC: 7.9 (ref ?–6.5)

## 2021-01-13 PROCEDURE — 99213 OFFICE O/P EST LOW 20 MIN: CPT | Performed by: INTERNAL MEDICINE

## 2021-01-13 PROCEDURE — 90472 IMMUNIZATION ADMIN EACH ADD: CPT | Performed by: INTERNAL MEDICINE

## 2021-01-13 PROCEDURE — 90715 TDAP VACCINE 7 YRS/> IM: CPT | Performed by: INTERNAL MEDICINE

## 2021-01-13 PROCEDURE — 83036 HEMOGLOBIN GLYCOSYLATED A1C: CPT | Performed by: INTERNAL MEDICINE

## 2021-01-13 PROCEDURE — 90732 PPSV23 VACC 2 YRS+ SUBQ/IM: CPT | Performed by: INTERNAL MEDICINE

## 2021-01-13 PROCEDURE — 90471 IMMUNIZATION ADMIN: CPT | Performed by: INTERNAL MEDICINE

## 2021-01-13 NOTE — PROGRESS NOTES
Patient's shoes and socks removed  Right Foot/Ankle   Right Foot Inspection  Skin Exam: skin normal and skin intact no dry skin, no warmth, no callus, no erythema, no maceration, no abnormal color, no pre-ulcer, no ulcer and no callus                          Toe Exam: ROM and strength within normal limitsno swelling, no tenderness, erythema and  no right toe deformity  Sensory   Vibration: intact  Proprioception: intact   Monofilament testing: intact  Vascular  Capillary refills: < 3 seconds  The right DP pulse is 1+  The right PT pulse is 1+  Left Foot/Ankle  Left Foot Inspection  Skin Exam: skin normal and skin intactno dry skin, no warmth, no erythema, no maceration, normal color, no pre-ulcer, no ulcer and no callus                         Toe Exam: ROM and strength within normal limitsno swelling, no tenderness, no erythema and no left toe deformity                   Sensory   Vibration: intact  Proprioception: intact  Monofilament: intact  Vascular  Capillary refills: < 3 seconds  The left DP pulse is 1+  The left PT pulse is 1+  Assign Risk Category:  No deformity present; No loss of protective sensation; No weak pulses       Risk: 0    INTERNAL MEDICINE FOLLOW-UP OFFICE VISIT  [facility]  [address] [place] [address]    NAME: Hemant Mendez  AGE: 42 y o  SEX: male    DATE OF ENCOUNTER: 1/13/2021    Assessment and Plan     DM2: With concerns for non compliance in the past secondary to lack of financial resources  Patient reports full compliance with meds a this time though is not sure if he has discussed his situation with our SW  Per chart patient not eligible for any program to help with paying for meds  BG readings typically low to mid 100s both pre breakfast and pre dinner  Patient without hypoglycemic episodes  BG sometimes elevated secondary to big lunches     -Repeat A1c ordered  -Patient follows with ophthalmology  -Continue BG logs  -Continue metformin, lisinopril, lipitor    -Foot exam performed   -advised good diet/exercise  Orders Placed This Encounter   Procedures    TDAP VACCINE GREATER THAN OR EQUAL TO 6YO IM    PNEUMOCOCCAL POLYSACCHARIDE VACCINE 23-VALENT =>3YO SQ IM    POCT hemoglobin A1c         Chief Complaint     Chief Complaint   Patient presents with    Follow-up       History of Present Illness     HPI     42year old male presents for routing follow up  He is without acute complaint  He reports compliance with all related DM2 medications  Denies hypoglycemic episodes with insulin  Reports typically good dietary compliance  BG readings are typically in th elow to mid 100s both prior to breakfast and prior to dinner  Patient states that he walks approx 30 daily  He has no concerns at this time  The following portions of the patient's history were reviewed and updated as appropriate: allergies, current medications, past family history, past medical history, past social history, past surgical history and problem list     Review of Systems     Review of Systems   Constitutional: Negative for activity change, appetite change, chills, diaphoresis, fatigue, fever and unexpected weight change  Respiratory: Negative for apnea, cough, choking, chest tightness, shortness of breath, wheezing and stridor  Cardiovascular: Negative for chest pain, palpitations and leg swelling  Gastrointestinal: Negative for abdominal distention, abdominal pain, anal bleeding, blood in stool, constipation, diarrhea, nausea, rectal pain and vomiting  Genitourinary: Negative for difficulty urinating, dysuria, enuresis, flank pain, frequency, genital sores and hematuria  Musculoskeletal: Negative for arthralgias, back pain, gait problem, joint swelling, myalgias, neck pain and neck stiffness  Skin: Negative for color change, pallor, rash and wound     Neurological: Negative for dizziness, facial asymmetry, light-headedness, numbness and headaches  Psychiatric/Behavioral: Negative for agitation, behavioral problems, confusion, decreased concentration, dysphoric mood and hallucinations  Active Problem List     Patient Active Problem List   Diagnosis    Acute bilateral low back pain without sciatica    Decreased vision    Dyspepsia    Impaired fasting blood sugar    Overweight    Proteinuria    Pterygium of left eye    Symptoms involving urinary system    Dizziness    Type 2 diabetes mellitus without complication, without long-term current use of insulin (HCC)    Diabetes mellitus due to underlying condition with diabetic nephropathy, without long-term current use of insulin (HCC)    Viral URI with cough    Status post cystoscopy with ureteral stent placement    Pneumonia due to COVID-19 virus       Objective     /79 (BP Location: Right arm, Patient Position: Sitting, Cuff Size: Standard)   Pulse 66   Temp (!) 97 3 °F (36 3 °C) (Temporal)   Ht 5' 6" (1 676 m)   Wt 86 5 kg (190 lb 9 6 oz)   BMI 30 76 kg/m²     Physical Exam  Vitals signs reviewed  Constitutional:       General: He is not in acute distress  Appearance: Normal appearance  He is obese  He is not ill-appearing, toxic-appearing or diaphoretic  HENT:      Head: Normocephalic and atraumatic  Nose: Nose normal  No congestion or rhinorrhea  Mouth/Throat:      Mouth: Mucous membranes are moist       Pharynx: Oropharynx is clear  No posterior oropharyngeal erythema  Eyes:      Extraocular Movements: Extraocular movements intact  Pupils: Pupils are equal, round, and reactive to light  Cardiovascular:      Rate and Rhythm: Normal rate and regular rhythm  Pulses: no weak pulses          Dorsalis pedis pulses are 1+ on the right side and 1+ on the left side  Posterior tibial pulses are 1+ on the right side and 1+ on the left side  Heart sounds: Normal heart sounds  No murmur  No friction rub  No gallop      Pulmonary: Effort: Pulmonary effort is normal  No respiratory distress  Breath sounds: Normal breath sounds  No stridor  No wheezing, rhonchi or rales  Chest:      Chest wall: No tenderness  Abdominal:      General: Abdomen is flat  Bowel sounds are normal  There is no distension  Palpations: Abdomen is soft  There is no mass  Tenderness: There is no abdominal tenderness  There is no guarding or rebound  Hernia: No hernia is present  Musculoskeletal: Normal range of motion  General: No swelling, tenderness, deformity or signs of injury  Feet:      Right foot:      Skin integrity: No ulcer, skin breakdown, erythema, warmth, callus or dry skin  Left foot:      Skin integrity: No ulcer, skin breakdown, erythema, warmth, callus or dry skin  Skin:     General: Skin is warm and dry  Neurological:      General: No focal deficit present  Mental Status: He is alert and oriented to person, place, and time     Psychiatric:         Mood and Affect: Mood normal            Pertinent Laboratory/Diagnostic Studies:  CBC:   Lab Results   Component Value Date/Time    WBC 7 15 05/08/2020 07:06 AM    RBC 4 93 05/08/2020 07:06 AM    HGB 14 6 05/08/2020 07:06 AM    HCT 41 8 05/08/2020 07:06 AM    MCV 85 05/08/2020 07:06 AM    MCH 29 6 05/08/2020 07:06 AM    MCHC 34 9 05/08/2020 07:06 AM    RDW 11 6 05/08/2020 07:06 AM    MPV 10 5 05/08/2020 07:06 AM     05/08/2020 07:06 AM    NRBC 0 05/08/2020 07:06 AM    NEUTOPHILPCT 80 (H) 05/08/2020 07:06 AM    LYMPHOPCT 15 05/08/2020 07:06 AM    MONOPCT 5 05/08/2020 07:06 AM    EOSPCT 0 05/08/2020 07:06 AM    BASOPCT 0 05/08/2020 07:06 AM    NEUTROABS 5 72 05/08/2020 07:06 AM    LYMPHSABS 1 05 05/08/2020 07:06 AM    MONOSABS 0 36 05/08/2020 07:06 AM    EOSABS 0 00 05/08/2020 07:06 AM     Chemistry Profile:   Lab Results   Component Value Date/Time    K 3 4 (L) 05/08/2020 07:06 AM    CL 99 (L) 05/08/2020 07:06 AM    CO2 23 05/08/2020 07:06 AM    BUN 9 05/08/2020 07:06 AM    CREATININE 0 74 05/08/2020 07:06 AM    GLUC 253 (H) 05/08/2020 07:06 AM    GLUF 117 (H) 05/28/2019 05:17 AM    CALCIUM 9 0 05/08/2020 07:06 AM    AST 76 (H) 05/08/2020 07:06 AM    ALT 88 (H) 05/08/2020 07:06 AM    ALKPHOS 103 05/08/2020 07:06 AM    EGFR 115 05/08/2020 07:06 AM     CBC:   Results from Last 12 Months   Lab Units 05/08/20  0706   WBC Thousand/uL 7 15   RBC Million/uL 4 93   HEMOGLOBIN g/dL 14 6   HEMATOCRIT % 41 8   MCV fL 85   MCH pg 29 6   MCHC g/dL 34 9   RDW % 11 6   MPV fL 10 5   PLATELETS Thousands/uL 217   NRBC AUTO /100 WBCs 0   NEUTROS PCT % 80*   LYMPHS PCT % 15   MONOS PCT % 5   EOS PCT % 0   BASOS PCT % 0   NEUTROS ABS Thousands/µL 5 72   LYMPHS ABS Thousands/µL 1 05   MONOS ABS Thousand/µL 0 36   EOS ABS Thousand/µL 0 00     Chemistry Profile:   Results from Last 12 Months   Lab Units 05/08/20  0706   POTASSIUM mmol/L 3 4*   CHLORIDE mmol/L 99*   CO2 mmol/L 23   BUN mg/dL 9   CREATININE mg/dL 0 74   GLUCOSE RANDOM mg/dL 253*   CALCIUM mg/dL 9 0   AST U/L 76*   ALT U/L 88*   ALK PHOS U/L 103   EGFR ml/min/1 73sq m 115       Current Medications     Current Outpatient Medications:     atorvastatin (LIPITOR) 20 mg tablet, TAKE 1 TABLET (20 MG TOTAL) BY MOUTH DAILY AT BEDTIME, Disp: 30 tablet, Rfl: 3    atorvastatin (LIPITOR) 40 mg tablet, Take 0 5 tablets (20 mg total) by mouth daily at bedtime, Disp: 30 tablet, Rfl: 3    clotrimazole-betamethasone (LOTRISONE) 1-0 05 % cream, Apply topically 2 (two) times a day, Disp: 30 g, Rfl: 2    INS SYRINGE/NEEDLE 1CC/28G 28G X 1/2" 1 ML MISC, by Does not apply route 2 (two) times a day, Disp: 100 each, Rfl: 2    insulin NPH-insulin regular (NovoLIN 70/30 ReliOn) 100 units/mL subcutaneous injection, Inject 15 Units under the skin 2 (two) times a day before meals, Disp: 10 mL, Rfl: 3    lisinopril (ZESTRIL) 2 5 mg tablet, TOMCELESTINE MAE TABLETA TODOS TOM TRONCOSO, Disp: 30 tablet, Rfl: 3    metFORMIN (GLUCOPHAGE) 1000 MG tablet, TAKE 0  5 TABLETS (500 MG TOTAL) BY MOUTH 2 (TWO) TIMES A DAY WITH MEALS, Disp: 30 tablet, Rfl: 7    Health Maintenance     Health Maintenance   Topic Date Due    Pneumococcal Vaccine: Pediatrics (0 to 5 Years) and At-Risk Patients (6 to 59 Years) (1 of 1 - PPSV23) 01/01/1985    BMI: Followup Plan  01/01/1997    Annual Physical  01/01/1997    DTaP,Tdap,and Td Vaccines (1 - Tdap) 01/01/2000    Diabetic Foot Exam  03/20/2020    HEMOGLOBIN A1C  11/07/2020    Depression Screening PHQ  06/16/2021    DM Eye Exam  11/18/2021    BMI: Adult  01/13/2022    HIV Screening  Completed    Influenza Vaccine  Completed    HIB Vaccine  Aged Out    Hepatitis B Vaccine  Aged Out    IPV Vaccine  Aged Out    Hepatitis A Vaccine  Aged Out    Meningococcal ACWY Vaccine  Aged Out    HPV Vaccine  Aged Out     Immunization History   Administered Date(s) Administered    Influenza, recombinant, quadrivalent,injectable, preservative free 09/30/2020    Pneumococcal Polysaccharide PPV23 01/13/2021    Tdap 01/13/2021       Andi Taylor  1/13/2021 8:26 AM

## 2021-01-27 ENCOUNTER — TELEPHONE (OUTPATIENT)
Dept: UROLOGY | Facility: MEDICAL CENTER | Age: 41
End: 2021-01-27

## 2021-02-03 DIAGNOSIS — N20.0 NEPHROLITHIASIS: Primary | ICD-10-CM

## 2021-03-17 ENCOUNTER — TRANSCRIBE ORDERS (OUTPATIENT)
Dept: RADIOLOGY | Facility: HOSPITAL | Age: 41
End: 2021-03-17

## 2021-03-17 ENCOUNTER — HOSPITAL ENCOUNTER (OUTPATIENT)
Dept: RADIOLOGY | Facility: HOSPITAL | Age: 41
Discharge: HOME | End: 2021-03-17
Attending: UROLOGY

## 2021-03-17 DIAGNOSIS — N20.0 NEPHROLITHIASIS: ICD-10-CM

## 2021-03-17 PROCEDURE — 74018 RADEX ABDOMEN 1 VIEW: CPT

## 2021-05-18 DIAGNOSIS — E08.21 DIABETES MELLITUS DUE TO UNDERLYING CONDITION WITH DIABETIC NEPHROPATHY, WITHOUT LONG-TERM CURRENT USE OF INSULIN (HCC): ICD-10-CM

## 2021-05-23 RX ORDER — LISINOPRIL 2.5 MG/1
TABLET ORAL
Qty: 30 TABLET | Refills: 3 | Status: SHIPPED | OUTPATIENT
Start: 2021-05-23 | End: 2021-10-11

## 2021-06-17 DIAGNOSIS — U07.1 COVID-19: ICD-10-CM

## 2021-06-17 DIAGNOSIS — U07.1 PNEUMONIA DUE TO COVID-19 VIRUS: ICD-10-CM

## 2021-06-17 DIAGNOSIS — J12.82 PNEUMONIA DUE TO COVID-19 VIRUS: ICD-10-CM

## 2021-06-17 RX ORDER — ATORVASTATIN CALCIUM 20 MG/1
TABLET, FILM COATED ORAL
Qty: 30 TABLET | Refills: 3 | Status: SHIPPED | OUTPATIENT
Start: 2021-06-17 | End: 2021-10-19 | Stop reason: SDUPTHER

## 2021-10-19 ENCOUNTER — OFFICE VISIT (OUTPATIENT)
Dept: INTERNAL MEDICINE CLINIC | Facility: CLINIC | Age: 41
End: 2021-10-19

## 2021-10-19 ENCOUNTER — APPOINTMENT (OUTPATIENT)
Dept: LAB | Facility: CLINIC | Age: 41
End: 2021-10-19
Payer: COMMERCIAL

## 2021-10-19 VITALS
OXYGEN SATURATION: 97 % | BODY MASS INDEX: 29.18 KG/M2 | DIASTOLIC BLOOD PRESSURE: 69 MMHG | TEMPERATURE: 98.3 F | HEART RATE: 75 BPM | SYSTOLIC BLOOD PRESSURE: 125 MMHG | HEIGHT: 66 IN | WEIGHT: 181.6 LBS

## 2021-10-19 DIAGNOSIS — E11.9 TYPE 2 DIABETES MELLITUS WITHOUT COMPLICATION, WITHOUT LONG-TERM CURRENT USE OF INSULIN (HCC): Primary | ICD-10-CM

## 2021-10-19 DIAGNOSIS — Z23 NEED FOR INFLUENZA VACCINATION: ICD-10-CM

## 2021-10-19 DIAGNOSIS — J12.82 PNEUMONIA DUE TO COVID-19 VIRUS: ICD-10-CM

## 2021-10-19 DIAGNOSIS — U07.1 COVID-19: ICD-10-CM

## 2021-10-19 DIAGNOSIS — E08.21 DIABETES MELLITUS DUE TO UNDERLYING CONDITION WITH DIABETIC NEPHROPATHY, WITHOUT LONG-TERM CURRENT USE OF INSULIN (HCC): ICD-10-CM

## 2021-10-19 DIAGNOSIS — U07.1 PNEUMONIA DUE TO COVID-19 VIRUS: ICD-10-CM

## 2021-10-19 LAB
BACTERIA UR QL AUTO: ABNORMAL /HPF
BILIRUB UR QL STRIP: NEGATIVE
CLARITY UR: CLEAR
COLOR UR: YELLOW
GLUCOSE UR STRIP-MCNC: ABNORMAL MG/DL
HGB UR QL STRIP.AUTO: NEGATIVE
HYALINE CASTS #/AREA URNS LPF: ABNORMAL /LPF
KETONES UR STRIP-MCNC: ABNORMAL MG/DL
LEUKOCYTE ESTERASE UR QL STRIP: ABNORMAL
NITRITE UR QL STRIP: NEGATIVE
NON-SQ EPI CELLS URNS QL MICRO: ABNORMAL /HPF
PH UR STRIP.AUTO: 6.5 [PH]
PROT UR STRIP-MCNC: ABNORMAL MG/DL
RBC #/AREA URNS AUTO: ABNORMAL /HPF
SL AMB POCT HEMOGLOBIN AIC: 9.9 (ref ?–6.5)
SP GR UR STRIP.AUTO: >1.045 (ref 1–1.03)
UROBILINOGEN UR QL STRIP.AUTO: 0.2 E.U./DL
WBC #/AREA URNS AUTO: ABNORMAL /HPF

## 2021-10-19 PROCEDURE — 81001 URINALYSIS AUTO W/SCOPE: CPT | Performed by: INTERNAL MEDICINE

## 2021-10-19 PROCEDURE — 83036 HEMOGLOBIN GLYCOSYLATED A1C: CPT | Performed by: INTERNAL MEDICINE

## 2021-10-19 PROCEDURE — 99213 OFFICE O/P EST LOW 20 MIN: CPT | Performed by: INTERNAL MEDICINE

## 2021-10-19 PROCEDURE — 90682 RIV4 VACC RECOMBINANT DNA IM: CPT | Performed by: INTERNAL MEDICINE

## 2021-10-19 PROCEDURE — 90471 IMMUNIZATION ADMIN: CPT | Performed by: INTERNAL MEDICINE

## 2021-10-19 RX ORDER — LISINOPRIL 2.5 MG/1
2.5 TABLET ORAL DAILY
Qty: 30 TABLET | Refills: 3 | Status: SHIPPED | OUTPATIENT
Start: 2021-10-19

## 2021-10-19 RX ORDER — ATORVASTATIN CALCIUM 20 MG/1
20 TABLET, FILM COATED ORAL DAILY
Qty: 30 TABLET | Refills: 3 | Status: SHIPPED | OUTPATIENT
Start: 2021-10-19

## 2021-10-19 RX ORDER — SYRINGE AND NEEDLE,INSULIN,1ML
SYRINGE, EMPTY DISPOSABLE MISCELLANEOUS
Qty: 100 EACH | Refills: 2 | Status: SHIPPED | OUTPATIENT
Start: 2021-10-19 | End: 2021-10-19 | Stop reason: SDUPTHER

## 2021-10-19 RX ORDER — HUMAN INSULIN 100 [IU]/ML
15 INJECTION, SUSPENSION SUBCUTANEOUS
Qty: 10 ML | Refills: 3 | Status: SHIPPED | OUTPATIENT
Start: 2021-10-19 | End: 2021-10-19 | Stop reason: SDUPTHER

## 2021-10-19 RX ORDER — SYRINGE AND NEEDLE,INSULIN,1ML
SYRINGE, EMPTY DISPOSABLE MISCELLANEOUS
Qty: 100 EACH | Refills: 2 | Status: SHIPPED | OUTPATIENT
Start: 2021-10-19

## 2021-10-19 RX ORDER — HUMAN INSULIN 100 [IU]/ML
15 INJECTION, SUSPENSION SUBCUTANEOUS
Qty: 10 ML | Refills: 5 | Status: SHIPPED | OUTPATIENT
Start: 2021-10-19

## 2022-01-21 ENCOUNTER — OFFICE VISIT (OUTPATIENT)
Dept: INTERNAL MEDICINE CLINIC | Facility: CLINIC | Age: 42
End: 2022-01-21

## 2022-01-21 VITALS
TEMPERATURE: 97.7 F | BODY MASS INDEX: 29.86 KG/M2 | HEART RATE: 63 BPM | SYSTOLIC BLOOD PRESSURE: 126 MMHG | WEIGHT: 185 LBS | DIASTOLIC BLOOD PRESSURE: 81 MMHG

## 2022-01-21 DIAGNOSIS — E11.9 TYPE 2 DIABETES MELLITUS WITHOUT COMPLICATION, WITHOUT LONG-TERM CURRENT USE OF INSULIN (HCC): Primary | ICD-10-CM

## 2022-01-21 DIAGNOSIS — S39.012A STRAIN OF LUMBAR REGION, INITIAL ENCOUNTER: ICD-10-CM

## 2022-01-21 LAB
LEFT EYE DIABETIC RETINOPATHY: NORMAL
LEFT EYE IMAGE QUALITY: NORMAL
LEFT EYE MACULAR EDEMA: NORMAL
LEFT EYE OTHER RETINOPATHY: NORMAL
RIGHT EYE DIABETIC RETINOPATHY: NORMAL
RIGHT EYE IMAGE QUALITY: NORMAL
RIGHT EYE MACULAR EDEMA: NORMAL
RIGHT EYE OTHER RETINOPATHY: NORMAL
SEVERITY (EYE EXAM): NORMAL
SL AMB POCT HEMOGLOBIN AIC: 8.7 (ref ?–6.5)

## 2022-01-21 PROCEDURE — 83036 HEMOGLOBIN GLYCOSYLATED A1C: CPT | Performed by: INTERNAL MEDICINE

## 2022-01-21 PROCEDURE — 99214 OFFICE O/P EST MOD 30 MIN: CPT | Performed by: INTERNAL MEDICINE

## 2022-01-21 RX ORDER — CYCLOBENZAPRINE HCL 10 MG
10 TABLET ORAL DAILY PRN
Qty: 10 TABLET | Refills: 0 | Status: SHIPPED | OUTPATIENT
Start: 2022-01-21 | End: 2022-06-08

## 2022-01-21 NOTE — PROGRESS NOTES
Patient's shoes and socks removed  Right Foot/Ankle   Right Foot Inspection  Skin Exam: skin normal and skin intact  No dry skin, no warmth, no callus, no erythema, no maceration, no abnormal color, no pre-ulcer, no ulcer and no callus  Toe Exam: ROM and strength within normal limits  No swelling, no tenderness, erythema and  no right toe deformity    Sensory   Vibration: intact  Proprioception: intact  Monofilament testing: intact    Vascular  Capillary refills: < 3 seconds          Left Foot/Ankle  Left Foot Inspection  Skin Exam: skin normal and skin intact  No dry skin, no warmth, no erythema, no maceration, normal color, no pre-ulcer, no ulcer and no callus  Toe Exam: ROM and strength within normal limits  No swelling, no tenderness, no erythema and no left toe deformity       Sensory   Vibration: intact  Proprioception: intact  Monofilament testing: intact    Vascular  Capillary refills: < 3 seconds        Assign Risk Category  No deformity present  No weak pulses

## 2022-01-21 NOTE — PROGRESS NOTES
INTERNAL MEDICINE FOLLOW-UP OFFICE VISIT  [facility]  [address] [place] [address]    NAME: Darya Zelaya,# 29  AGE: 36 y o  SEX: male    DATE OF ENCOUNTER: 1/21/2022    Assessment and Plan     1  Type 2 diabetes mellitus without complication, without long-term current use of insulin (HCC)    Compliant with NPH 70/30 15u BID and Metformin 1000mg BID  Denies any hypoglycemia episode  Denies any polyuria / polydipsea  Fasting sugars 160s  Foot Exam today  BP stable     Continue Current regimen  Will follow up in 6 weeks with a log  - POCT hemoglobin A1c  - Microalbumin / creatinine urine ratio    2  Strain of lumbar region, initial encounter    Low back pain  Started 2 days ago at work after lifting an object  No red flag symptoms  Will tx with voltaren and flexeril    - Diclofenac Sodium (VOLTAREN) 1 %; Apply 2 g topically 4 (four) times a day  Dispense: 350 g; Refill: 0  - cyclobenzaprine (FLEXERIL) 10 mg tablet; Take 1 tablet (10 mg total) by mouth daily as needed for muscle spasms  Dispense: 10 tablet; Refill: 0    No orders of the defined types were placed in this encounter  Chief Complaint     Chief Complaint   Patient presents with    Follow-up     diabetes       History of Present Illness     HPI    Mr  Darya Zelaya,# 28 is a 12-[id number] male with a PMH of type 2 diabetes mellitus with long term use of insulin who presents for routine follow-up and management of his diabetes  Compliant with medications  Currently on 70/30 15u BID  Pt is eating healthy and staying active  Fasting glucose in 160s  Keeps a log but forgot to bring it here  No hypoglycemic episodes  Denies any smoking hx  Ocassional alcohol  Work in [employer]  Doing well overall  Denies any visual disturbances, headaches, chest pain, abdominal pain, nausea, vomiting,fever or chills  BMI Counseling: Body mass index is 29 86 kg/m²   The BMI is above normal  Nutrition recommendations include reducing portion sizes, decreasing overall calorie intake and reducing fast food intake  Exercise recommendations include exercising 3-5 times per week  The following portions of the patient's history were reviewed and updated as appropriate: allergies, current medications, past family history, past medical history, past social history, past surgical history and problem list     Review of Systems     Review of Systems   Constitutional: Negative for chills, diaphoresis, fatigue and unexpected weight change  HENT: Negative for congestion, drooling and trouble swallowing  Eyes: Negative for pain and visual disturbance  Respiratory: Negative for cough, choking, chest tightness, shortness of breath and wheezing  Cardiovascular: Negative for chest pain, palpitations and leg swelling  Gastrointestinal: Negative for abdominal distention, abdominal pain, nausea and vomiting  Genitourinary: Negative for decreased urine volume and flank pain  Musculoskeletal: Negative for gait problem and neck stiffness  Neurological: Negative for syncope, speech difficulty, weakness and light-headedness  Hematological: Negative for adenopathy  Does not bruise/bleed easily  Psychiatric/Behavioral: Negative for confusion and suicidal ideas         Active Problem List     Patient Active Problem List   Diagnosis    Acute bilateral low back pain without sciatica    Decreased vision    Dyspepsia    Impaired fasting blood sugar    Overweight    Proteinuria    Pterygium of left eye    Symptoms involving urinary system    Dizziness    Type 2 diabetes mellitus without complication, without long-term current use of insulin (HCC)    Diabetes mellitus due to underlying condition with diabetic nephropathy, without long-term current use of insulin (HCC)    Viral URI with cough    Status post cystoscopy with ureteral stent placement    Pneumonia due to COVID-19 virus       Objective     Temp 97 7 °F (36 5 °C) (Temporal)   Wt 83 9 kg (185 lb)   BMI 29 86 kg/m²     Physical Exam  Constitutional:       General: He is not in acute distress  Appearance: He is normal weight  He is not ill-appearing, toxic-appearing or diaphoretic  HENT:      Head: Normocephalic and atraumatic  Cardiovascular:      Rate and Rhythm: Normal rate and regular rhythm  Pulses: Normal pulses  Heart sounds: Normal heart sounds  No murmur heard  No friction rub  No gallop  Pulmonary:      Effort: Pulmonary effort is normal  No respiratory distress  Breath sounds: Normal breath sounds  No stridor  No wheezing, rhonchi or rales  Chest:      Chest wall: No tenderness  Abdominal:      General: There is no distension  Palpations: There is no mass  Tenderness: There is no abdominal tenderness  There is no right CVA tenderness, left CVA tenderness, guarding or rebound  Hernia: No hernia is present  Musculoskeletal:      Right lower leg: No edema  Left lower leg: No edema  Neurological:      General: No focal deficit present     Psychiatric:         Mood and Affect: Mood normal          Behavior: Behavior normal            Current Medications     Current Outpatient Medications:     atorvastatin (LIPITOR) 20 mg tablet, Take 1 tablet (20 mg total) by mouth daily, Disp: 30 tablet, Rfl: 3    clotrimazole-betamethasone (LOTRISONE) 1-0 05 % cream, Apply topically 2 (two) times a day (Patient not taking: Reported on 10/19/2021), Disp: 30 g, Rfl: 2    INS SYRINGE/NEEDLE 1CC/28G 28G X 1/2" 1 ML MISC, Use 2 (two) times a day, Disp: 100 each, Rfl: 2    insulin NPH-insulin regular (NovoLIN 70/30 ReliOn) 100 units/mL subcutaneous injection, Inject 15 Units under the skin 2 (two) times a day before meals, Disp: 10 mL, Rfl: 5    lisinopril (ZESTRIL) 2 5 mg tablet, Take 1 tablet (2 5 mg total) by mouth daily, Disp: 30 tablet, Rfl: 3    metFORMIN (GLUCOPHAGE) 1000 MG tablet, Take 1 tablet (1,000 mg total) by mouth 2 (two) times a day with meals, Disp: 60 tablet, Rfl: 5    Health Maintenance     Health Maintenance   Topic Date Due    COVID-19 Vaccine (1) Never done    BMI: Followup Plan  Never done    Annual Physical  Never done    DM Eye Exam  11/18/2021    Diabetic Foot Exam  01/13/2022    HEMOGLOBIN A1C  04/19/2022    Depression Screening  10/19/2022    BMI: Adult  10/19/2022    DTaP,Tdap,and Td Vaccines (2 - Td or Tdap) 01/13/2031    Pneumococcal Vaccine: Pediatrics (0 to 5 Years) and At-Risk Patients (6 to 59 Years) (2 of 2 - PPSV23) 01/01/2044    HIV Screening  Completed    Hepatitis C Screening  Completed    Influenza Vaccine  Completed    HIB Vaccine  Aged Out    Hepatitis B Vaccine  Aged Out    IPV Vaccine  Aged Out    Hepatitis A Vaccine  Aged Out    Meningococcal ACWY Vaccine  Aged Out    HPV Vaccine  Aged Out     Immunization History   Administered Date(s) Administered    Influenza, recombinant, quadrivalent,injectable, preservative free 09/30/2020, 10/19/2021    Pneumococcal Polysaccharide PPV23 01/13/2021    Tdap 01/13/2021       Shanda Sherman MD  PGY 2

## 2022-01-27 ENCOUNTER — TELEPHONE (OUTPATIENT)
Dept: OTHER | Facility: OTHER | Age: 42
End: 2022-01-27

## 2022-01-27 NOTE — TELEPHONE ENCOUNTER
Returned call to [facility]  Advised that pt does not have an order for KUB  Pt has not been seen since Feb 2020  KUB was performed on 3/17/21  He will be instructed to call office if he is experiencing any urological issues

## 2022-01-27 NOTE — TELEPHONE ENCOUNTER
[facility] is calling stating they are unable to acces the XR abdomen 1 view kub order? Please resubmit it so she can pull it, the patient is willing to wait

## 2022-02-14 ENCOUNTER — TELEPHONE (OUTPATIENT)
Dept: INTERNAL MEDICINE CLINIC | Facility: CLINIC | Age: 42
End: 2022-02-14

## 2022-02-14 DIAGNOSIS — E11.9 TYPE 2 DIABETES MELLITUS WITHOUT COMPLICATION, WITHOUT LONG-TERM CURRENT USE OF INSULIN (HCC): ICD-10-CM

## 2022-02-14 NOTE — TELEPHONE ENCOUNTER
Patient needs a new referral for an abdomen xray  His previous one  2/3/2022  Patient received the referral from     [facility] [address] [facility] [address]  Phone: [phone]    Patient was given this number to request a referral

## 2022-03-23 ENCOUNTER — TELEPHONE (OUTPATIENT)
Dept: INTERNAL MEDICINE CLINIC | Facility: CLINIC | Age: 42
End: 2022-03-23

## 2022-06-08 ENCOUNTER — APPOINTMENT (OUTPATIENT)
Dept: LAB | Facility: CLINIC | Age: 42
End: 2022-06-08
Payer: COMMERCIAL

## 2022-06-08 ENCOUNTER — OFFICE VISIT (OUTPATIENT)
Dept: INTERNAL MEDICINE CLINIC | Facility: CLINIC | Age: 42
End: 2022-06-08

## 2022-06-08 VITALS
WEIGHT: 180.4 LBS | HEART RATE: 72 BPM | OXYGEN SATURATION: 99 % | TEMPERATURE: 97.3 F | BODY MASS INDEX: 29.12 KG/M2 | SYSTOLIC BLOOD PRESSURE: 127 MMHG | DIASTOLIC BLOOD PRESSURE: 79 MMHG

## 2022-06-08 DIAGNOSIS — Z01.20 DENTAL EXAMINATION: ICD-10-CM

## 2022-06-08 DIAGNOSIS — N48.1 BALANITIS: ICD-10-CM

## 2022-06-08 DIAGNOSIS — Z00.00 ANNUAL PHYSICAL EXAM: ICD-10-CM

## 2022-06-08 DIAGNOSIS — E11.9 TYPE 2 DIABETES MELLITUS WITHOUT COMPLICATION, WITHOUT LONG-TERM CURRENT USE OF INSULIN (HCC): Primary | ICD-10-CM

## 2022-06-08 DIAGNOSIS — Z23 NEED FOR PNEUMOCOCCAL VACCINATION: ICD-10-CM

## 2022-06-08 PROBLEM — R39.9 SYMPTOMS INVOLVING URINARY SYSTEM: Status: RESOLVED | Noted: 2017-10-11 | Resolved: 2022-06-08

## 2022-06-08 PROBLEM — R73.01 IMPAIRED FASTING BLOOD SUGAR: Status: RESOLVED | Noted: 2017-10-12 | Resolved: 2022-06-08

## 2022-06-08 LAB
CREAT UR-MCNC: 250 MG/DL
MICROALBUMIN UR-MCNC: 205 MG/L (ref 0–20)
MICROALBUMIN/CREAT 24H UR: 82 MG/G CREATININE (ref 0–30)
SL AMB POCT HEMOGLOBIN AIC: 7.4 (ref ?–6.5)

## 2022-06-08 PROCEDURE — 82570 ASSAY OF URINE CREATININE: CPT | Performed by: STUDENT IN AN ORGANIZED HEALTH CARE EDUCATION/TRAINING PROGRAM

## 2022-06-08 PROCEDURE — 82043 UR ALBUMIN QUANTITATIVE: CPT | Performed by: STUDENT IN AN ORGANIZED HEALTH CARE EDUCATION/TRAINING PROGRAM

## 2022-06-08 PROCEDURE — 99396 PREV VISIT EST AGE 40-64: CPT

## 2022-06-08 PROCEDURE — 90471 IMMUNIZATION ADMIN: CPT

## 2022-06-08 PROCEDURE — 90677 PCV20 VACCINE IM: CPT

## 2022-06-08 PROCEDURE — 83036 HEMOGLOBIN GLYCOSYLATED A1C: CPT

## 2022-06-08 RX ORDER — CLOTRIMAZOLE 1 %
CREAM (GRAM) TOPICAL 2 TIMES DAILY
Qty: 30 G | Refills: 0 | Status: SHIPPED | OUTPATIENT
Start: 2022-06-08

## 2022-06-08 NOTE — PATIENT INSTRUCTIONS
Wellness Visit for Adults   AMBULATORY CARE:   A wellness visit  is when you see your healthcare provider to get screened for health problems  Your healthcare provider will also give you advice on how to stay healthy  Write down your questions so you remember to ask them  Ask your healthcare provider how often you should have a wellness visit  What happens at a wellness visit:  Your healthcare provider will ask about your health, and your family history of health problems  This includes high blood pressure, heart disease, and cancer  He or she will ask if you have symptoms that concern you, if you smoke, and about your mood  You may also be asked about your intake of medicines, supplements, food, and alcohol  Any of the following may be done: Your weight  will be checked  Your height may also be checked so your body mass index (BMI) can be calculated  Your BMI shows if you are at a healthy weight  Your blood pressure  and heart rate will be checked  Your temperature may also be checked  Blood and urine tests  may be done  Blood tests may be done to check your cholesterol levels  Abnormal cholesterol levels increase your risk for heart disease and stroke  You may also need a blood or urine test to check for diabetes if you are at increased risk  Urine tests may be done to look for signs of an infection or kidney disease  A physical exam  includes checking your heartbeat and lungs with a stethoscope  Your healthcare provider may also check your skin to look for sun damage  Screening tests  may be recommended  A screening test is done to check for diseases that may not cause symptoms  The screening tests you may need depend on your age, gender, family history, and lifestyle habits  For example, colorectal screening may be recommended if you are 48years old or older  Screening tests you need if you are a woman:   A Pap smear  is used to screen for cervical cancer   Pap smears are usually done every 3 to 5 years depending on your age  You may need them more often if you have had abnormal Pap smear test results in the past  Ask your healthcare provider how often you should have a Pap smear  A mammogram  is an x-ray of your breasts to screen for breast cancer  Experts recommend mammograms every 2 years starting at age 48 years  You may need a mammogram at age 52 years or younger if you have an increased risk for breast cancer  Talk to your healthcare provider about when you should start having mammograms and how often you need them  Vaccines you may need:   Get an influenza vaccine  every year  The influenza vaccine protects you from the flu  Several types of viruses cause the flu  The viruses change over time, so new vaccines are made each year  Get a tetanus-diphtheria (Td) booster vaccine  every 10 years  This vaccine protects you against tetanus and diphtheria  Tetanus is a severe infection that may cause painful muscle spasms and lockjaw  Diphtheria is a severe bacterial infection that causes a thick covering in the back of your mouth and throat  Get a human papillomavirus (HPV) vaccine  if you are female and aged 23 to 32 or male 23 to 24 and never received it  This vaccine protects you from HPV infection  HPV is the most common infection spread by sexual contact  HPV may also cause vaginal, penile, and anal cancers  Get a pneumococcal vaccine  if you are aged 72 years or older  The pneumococcal vaccine is an injection given to protect you from pneumococcal disease  Pneumococcal disease is an infection caused by pneumococcal bacteria  The infection may cause pneumonia, meningitis, or an ear infection  Get a shingles vaccine  if you are 60 or older, even if you have had shingles before  The shingles vaccine is an injection to protect you from the varicella-zoster virus  This is the same virus that causes chickenpox   Shingles is a painful rash that develops in people who had chickenpox or have been exposed to the virus  How to eat healthy:  My Plate is a model for planning healthy meals  It shows the types and amounts of foods that should go on your plate  Fruits and vegetables make up about half of your plate, and grains and protein make up the other half  A serving of dairy is included on the side of your plate  The amount of calories and serving sizes you need depends on your age, gender, weight, and height  Examples of healthy foods are listed below:  Eat a variety of vegetables  such as dark green, red, and orange vegetables  You can also include canned vegetables low in sodium (salt) and frozen vegetables without added butter or sauces  Eat a variety of fresh fruits , canned fruit in 100% juice, frozen fruit, and dried fruit  Include whole grains  At least half of the grains you eat should be whole grains  Examples include whole-wheat bread, wheat pasta, brown rice, and whole-grain cereals such as oatmeal     Eat a variety of protein foods such as seafood (fish and shellfish), lean meat, and poultry without skin (turkey and chicken)  Examples of lean meats include pork leg, shoulder, or tenderloin, and beef round, sirloin, tenderloin, and extra lean ground beef  Other protein foods include eggs and egg substitutes, beans, peas, soy products, nuts, and seeds  Choose low-fat dairy products such as skim or 1% milk or low-fat yogurt, cheese, and cottage cheese  Limit unhealthy fats  such as butter, hard margarine, and shortening  Exercise:  Exercise at least 30 minutes per day on most days of the week  Some examples of exercise include walking, biking, dancing, and swimming  You can also fit in more physical activity by taking the stairs instead of the elevator or parking farther away from stores  Include muscle strengthening activities 2 days each week  Regular exercise provides many health benefits   It helps you manage your weight, and decreases your risk for type 2 diabetes, heart disease, stroke, and high blood pressure  Exercise can also help improve your mood  Ask your healthcare provider about the best exercise plan for you  General health and safety guidelines:   Do not smoke  Nicotine and other chemicals in cigarettes and cigars can cause lung damage  Ask your healthcare provider for information if you currently smoke and need help to quit  E-cigarettes or smokeless tobacco still contain nicotine  Talk to your healthcare provider before you use these products  Limit alcohol  A drink of alcohol is 12 ounces of beer, 5 ounces of wine, or 1½ ounces of liquor  Lose weight, if needed  Being overweight increases your risk of certain health conditions  These include heart disease, high blood pressure, type 2 diabetes, and certain types of cancer  Protect your skin  Do not sunbathe or use tanning beds  Use sunscreen with a SPF 15 or higher  Apply sunscreen at least 15 minutes before you go outside  Reapply sunscreen every 2 hours  Wear protective clothing, hats, and sunglasses when you are outside  Drive safely  Always wear your seatbelt  Make sure everyone in your car wears a seatbelt  A seatbelt can save your life if you are in an accident  Do not use your cell phone when you are driving  This could distract you and cause an accident  Pull over if you need to make a call or send a text message  Practice safe sex  Use latex condoms if are sexually active and have more than one partner  Your healthcare provider may recommend screening tests for sexually transmitted infections (STIs)  Wear helmets, lifejackets, and protective gear  Always wear a helmet when you ride a bike or motorcycle, go skiing, or play sports that could cause a head injury  Wear protective equipment when you play sports  Wear a lifejacket when you are on a boat or doing water sports      © Copyright [employer] 2022 Information is for End User's use only and may not be sold, redistributed or otherwise used for commercial purposes  All illustrations and images included in CareNotes® are the copyrighted property of A D A M , Inc  or Yana Zelaya  The above information is an  only  It is not intended as medical advice for individual conditions or treatments  Talk to your doctor, nurse or pharmacist before following any medical regimen to see if it is safe and effective for you  It was great to meet you  Keep up the good work with diabetes control  We have sent a cream to the pharmacy if you get any infection   Also, dentist referral given to you today  You will get a pneumonia shot today  You also need to complete some labs which are in the computer   Our office will call to check on status of your eye doctor referral

## 2022-06-08 NOTE — PROGRESS NOTES
ADULT ANNUAL PHYSICAL  [address] [facility]    NAME: Darya Zelaya,# 29  AGE: 36 y o  SEX: male  : 1980     DATE: 2022     Assessment and Plan:     Problem List Items Addressed This Visit        Endocrine    Type 2 diabetes mellitus without complication, without long-term current use of insulin ([facility] [address] ) - Primary    Relevant Orders    POCT hemoglobin A1c      Other Visit Diagnoses     Need for pneumococcal vaccination        Relevant Orders    Pneumococcal Conjugate Vaccine 20-valent (Pcv20)    Annual physical exam              Immunizations and preventive care screenings were discussed with patient today  Appropriate education was printed on patient's after visit summary  Counseling:  · Alcohol/drug use: discussed moderation in alcohol intake, the recommendations for healthy alcohol use, and avoidance of illicit drug use  Foot exam done in 2022 and so not done today  On discharge , made appt with ophthalmology for 2022  Advised to get labs which were placed in 2021  He does not have any penile itching today, but states sometimes in the warm weather he does and so refilled clotrimazole  However discussed keeping area clean and dry and tighter BS control will help  To call us if any issues  No recurrent nephrolithiasis issues  No follow-ups on file  Chief Complaint:     Chief Complaint   Patient presents with    Annual Exam      History of Present Illness:     Adult Annual Physical   Patient here for a comprehensive physical exam  The patient reports no problems  He was last seen a few months ago and does have diabetes, however has been following his diet and a1c re checked in office today improved to 7 4 from 8 7 in January  He has been exercising and watching his diet as well as reports reports compliance with insulin and medications    He uses 15 units of 70/30 per day of NPH as well as metformin    BS ranges from 107-150's, had 200 once because of something he ate   Denies any low blood sugars or hypoglycemia   used via phone  Diet and Physical Activity  · Diet/Nutrition: consuming 3-5 servings of fruits/vegetables daily  Drinks lots of water and mainly eats fruits  · Exercise: vigorous cardiovascular exercise  For work, he works in [employer] and does lots of walking and manual work      Depression Screening  PHQ-2/9 [address]  · Sleep: gets 7-8 hours of sleep on average  · Hearing: normal - bilateral   · Vision: vision problems: does have pterygium of left eye and needs to make appt for this  States our office needs to complete paperwork but may be outdated  · Dental: no dental visits for >1 year  Does have some pain in one tooth   Would like to have referral   Does floss and brush 2/day     Health  · Symptoms include: since his stent placed a few years ago, he sometimes experiences pain with urination but very mild and not frequent  Counseled on letting us know if this worsens  Review of Systems:     Review of Systems   Constitutional: Negative for chills and fever  Respiratory: Negative for chest tightness and shortness of breath  Cardiovascular: Negative for chest pain  Gastrointestinal: Negative for abdominal pain  Endocrine: Negative for polydipsia, polyphagia and polyuria  Genitourinary: Positive for dysuria  Negative for penile discharge  Musculoskeletal: Positive for back pain        Past Medical History:     Past Medical History:   Diagnosis Date    Cardiomegaly     Diabetes mellitus ([facility] [address] )       Past Surgical History:     Past Surgical History:   Procedure Laterality Date    EYE SURGERY      FOR DECREASED VISION, PTERYGIUM    [place] CYSTO/URETERO W/LITHOTRIPSY &INDWELL STENT INSRT Right 5/28/2019    Procedure: CYSTOSCOPY, URETEROSCOPY, STONE BASKET RETRIEVAL, AND INSERTION STENT URETERAL;  Surgeon: Alirio Caceres MD;  Location: [facility];  Service: Urology      Family History:     Family History   Problem Relation Age of Onset    Diabetes Mother     Kidney disease Mother     Other Mother         UTERINE CYST    No Known Problems Father       Social History:     Social History     Socioeconomic History    Marital status: Single     Spouse name: None    Number of children: 2    Years of education: None    Highest education level: None   Occupational History    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; currently working   Tobacco Use    Smoking status: Former Smoker    Smokeless tobacco: Former User    Tobacco comment: pt "quit 5 years ago"   Vaping Use    Vaping Use: Never used   Substance and Sexual Activity    Alcohol use: Not Currently    Drug use: Never    Sexual activity: Not Currently     Comment: SEXUAL ABSTINENCE   Other Topics Concern    None   Social History Narrative    Caffeine use: 1 cup a day    Does not exercise      Social Determinants of Health     Financial Resource Strain: Medium Risk    Difficulty of Paying Living Expenses: Somewhat hard   Food Insecurity: Food Insecurity Present    Worried About Running Out of Food in the Last Year: Sometimes true    Amy of Food in the Last Year: Sometimes true   Transportation Needs: No Transportation Needs    Lack of Transportation (Medical): No    Lack of Transportation (Non-Medical):  No   Physical Activity: Not on file   Stress: Not on file   Social Connections: Not on file   Intimate Partner Violence: Not on file   Housing Stability: Not on file      Current Medications:     Current Outpatient Medications   Medication Sig Dispense Refill    atorvastatin (LIPITOR) 20 mg tablet Take 1 tablet (20 mg total) by mouth daily 30 tablet 3    Diclofenac Sodium (VOLTAREN) 1 % Apply 2 g topically 4 (four) times a day 350 g 0    INS SYRINGE/NEEDLE 1CC/28G 28G X 1/2" 1 ML MISC Use 2 (two) times a day 100 each 2    insulin NPH-insulin regular (NovoLIN 70/30 ReliOn) 100 units/mL subcutaneous injection Inject 15 Units under the skin 2 (two) times a day before meals 10 mL 5    lisinopril (ZESTRIL) 2 5 mg tablet Take 1 tablet (2 5 mg total) by mouth daily 30 tablet 3    metFORMIN (GLUCOPHAGE) 1000 MG tablet Take 1 tablet (1,000 mg total) by mouth 2 (two) times a day with meals 180 tablet 3    clotrimazole-betamethasone (LOTRISONE) 1-0 05 % cream Apply topically 2 (two) times a day (Patient not taking: No sig reported) 30 g 2    cyclobenzaprine (FLEXERIL) 10 mg tablet Take 1 tablet (10 mg total) by mouth daily as needed for muscle spasms (Patient not taking: Reported on 6/8/2022) 10 tablet 0     No current facility-administered medications for this visit  Allergies: Allergies   Allergen Reactions    Other      Annotation - [id number]: tilapia fish - swelling of throat/eyes      Physical Exam:     /79 (BP Location: Left arm, Patient Position: Sitting, Cuff Size: Standard)   Pulse 72   Temp (!) 97 3 °F (36 3 °C) (Temporal)   Wt 81 8 kg (180 lb 6 4 oz)   SpO2 99%   BMI 29 12 kg/m²     Physical Exam  Constitutional:       Appearance: Normal appearance  Comments: English speaking    HENT:      Head: Normocephalic and atraumatic  Right Ear: Tympanic membrane and external ear normal       Left Ear: External ear normal  There is impacted cerumen  Mouth/Throat:      Mouth: Mucous membranes are moist       Comments: Fair dentition   Eyes:      General: No scleral icterus  Right eye: No discharge  Left eye: No discharge  Comments: Left ptyergium spanning over iris from medial eye   Cardiovascular:      Rate and Rhythm: Normal rate and regular rhythm  Pulses: Normal pulses  Heart sounds: Normal heart sounds  No murmur heard  Pulmonary:      Effort: Pulmonary effort is normal       Breath sounds: Normal breath sounds  No wheezing  Musculoskeletal:         General: No swelling  Right lower leg: No edema  Left lower leg: No edema  Skin:     Coloration: Skin is not jaundiced  Neurological:      General: No focal deficit present  Mental Status: He is alert and oriented to person, place, and time            Pawel Godwin, [address]

## 2022-07-27 ENCOUNTER — OFFICE VISIT (OUTPATIENT)
Dept: DENTISTRY | Facility: CLINIC | Age: 42
End: 2022-07-27

## 2022-07-27 VITALS — DIASTOLIC BLOOD PRESSURE: 80 MMHG | HEART RATE: 73 BPM | TEMPERATURE: 97.8 F | SYSTOLIC BLOOD PRESSURE: 127 MMHG

## 2022-07-27 DIAGNOSIS — Z01.21 ENCOUNTER FOR DENTAL EXAMINATION AND CLEANING WITH ABNORMAL FINDINGS: Primary | ICD-10-CM

## 2022-07-27 PROBLEM — K05.30 PERIODONTITIS: Status: ACTIVE | Noted: 2022-07-27

## 2022-07-27 PROCEDURE — D0210 INTRAORAL - COMPLETE SERIES OF RADIOGRAPHIC IMAGES: HCPCS

## 2022-07-27 PROCEDURE — D0150 COMPREHENSIVE ORAL EVALUATION - NEW OR ESTABLISHED PATIENT: HCPCS | Performed by: DENTIST

## 2022-07-27 NOTE — PROGRESS NOTES
COMP/FMX    [place] Tristanian translation used  CC:"I have broken molars that used to hurt but do not anymore "     Reviewed meds/hhx in Epic  ASA II  Type 2 Diabetes, blood glucose ranges from 120 -150 daily  Pt having Cataract surgery on L eye in October  FMX taken, reveals large caries and gen periodontal disease  PANO taken (no charge) to evaluate 3rd molars not seen on FMX  Full Mouth Perio charting completed, reveals gen 4-5mm w/ gen bup  PERIO:Stage 2,grade C (Last A1C in )  Treatment Recommended: FM SRPs    Discussed periodontal disease with patient  Patient understands and agrees to all recommended treatment discussed  Dr Jesus Kamara Exam:  Gen anterior I edge chipping    1 EXT  2 EXT  5 DF  6 IL  11 IL  12 DF  13   17 OB  20 O  31 OB  32 0    1)EXT #1 and 2 OS day any resident  2)UR/LR SRP 75 mins  3)UL/LL SRP 75 mins  4)Start restorations

## 2022-09-16 ENCOUNTER — LAB (OUTPATIENT)
Dept: LAB | Facility: CLINIC | Age: 42
End: 2022-09-16
Payer: COMMERCIAL

## 2022-09-16 DIAGNOSIS — E11.9 TYPE 2 DIABETES MELLITUS WITHOUT COMPLICATION, WITHOUT LONG-TERM CURRENT USE OF INSULIN (HCC): ICD-10-CM

## 2022-09-16 LAB
ALBUMIN SERPL BCP-MCNC: 3.9 G/DL (ref 3.5–5)
ALP SERPL-CCNC: 108 U/L (ref 46–116)
ALT SERPL W P-5'-P-CCNC: 94 U/L (ref 12–78)
ANION GAP SERPL CALCULATED.3IONS-SCNC: 9 MMOL/L (ref 4–13)
AST SERPL W P-5'-P-CCNC: 34 U/L (ref 5–45)
BASOPHILS # BLD AUTO: 0.03 THOUSANDS/ΜL (ref 0–0.1)
BASOPHILS NFR BLD AUTO: 0 % (ref 0–1)
BILIRUB SERPL-MCNC: 0.71 MG/DL (ref 0.2–1)
BUN SERPL-MCNC: 18 MG/DL (ref 5–25)
CALCIUM SERPL-MCNC: 8.9 MG/DL (ref 8.3–10.1)
CHLORIDE SERPL-SCNC: 105 MMOL/L (ref 96–108)
CHOLEST SERPL-MCNC: 138 MG/DL
CO2 SERPL-SCNC: 21 MMOL/L (ref 21–32)
CREAT SERPL-MCNC: 0.76 MG/DL (ref 0.6–1.3)
EOSINOPHIL # BLD AUTO: 0.07 THOUSAND/ΜL (ref 0–0.61)
EOSINOPHIL NFR BLD AUTO: 1 % (ref 0–6)
ERYTHROCYTE [DISTWIDTH] IN BLOOD BY AUTOMATED COUNT: 12.9 % (ref 11.6–15.1)
GFR SERPL CREATININE-BSD FRML MDRD: 111 ML/MIN/1.73SQ M
GLUCOSE P FAST SERPL-MCNC: 152 MG/DL (ref 65–99)
HCT VFR BLD AUTO: 40.7 % (ref 36.5–49.3)
HDLC SERPL-MCNC: 43 MG/DL
HGB BLD-MCNC: 13.6 G/DL (ref 12–17)
IMM GRANULOCYTES # BLD AUTO: 0.03 THOUSAND/UL (ref 0–0.2)
IMM GRANULOCYTES NFR BLD AUTO: 0 % (ref 0–2)
LDLC SERPL CALC-MCNC: 56 MG/DL (ref 0–100)
LYMPHOCYTES # BLD AUTO: 2.69 THOUSANDS/ΜL (ref 0.6–4.47)
LYMPHOCYTES NFR BLD AUTO: 30 % (ref 14–44)
MCH RBC QN AUTO: 28.8 PG (ref 26.8–34.3)
MCHC RBC AUTO-ENTMCNC: 33.4 G/DL (ref 31.4–37.4)
MCV RBC AUTO: 86 FL (ref 82–98)
MONOCYTES # BLD AUTO: 0.74 THOUSAND/ΜL (ref 0.17–1.22)
MONOCYTES NFR BLD AUTO: 8 % (ref 4–12)
NEUTROPHILS # BLD AUTO: 5.44 THOUSANDS/ΜL (ref 1.85–7.62)
NEUTS SEG NFR BLD AUTO: 61 % (ref 43–75)
NONHDLC SERPL-MCNC: 95 MG/DL
NRBC BLD AUTO-RTO: 0 /100 WBCS
PLATELET # BLD AUTO: 295 THOUSANDS/UL (ref 149–390)
PMV BLD AUTO: 12.2 FL (ref 8.9–12.7)
POTASSIUM SERPL-SCNC: 3.8 MMOL/L (ref 3.5–5.3)
PROT SERPL-MCNC: 7.8 G/DL (ref 6.4–8.4)
RBC # BLD AUTO: 4.72 MILLION/UL (ref 3.88–5.62)
SODIUM SERPL-SCNC: 135 MMOL/L (ref 135–147)
TRIGL SERPL-MCNC: 196 MG/DL
WBC # BLD AUTO: 9 THOUSAND/UL (ref 4.31–10.16)

## 2022-09-16 PROCEDURE — 80053 COMPREHEN METABOLIC PANEL: CPT

## 2022-09-16 PROCEDURE — 80061 LIPID PANEL: CPT

## 2022-09-16 PROCEDURE — 85025 COMPLETE CBC W/AUTO DIFF WBC: CPT

## 2022-09-16 PROCEDURE — 36415 COLL VENOUS BLD VENIPUNCTURE: CPT

## 2022-09-18 DIAGNOSIS — R74.01 ELEVATED ALANINE AMINOTRANSFERASE (ALT) LEVEL: Primary | ICD-10-CM

## 2022-09-18 NOTE — RESULT ENCOUNTER NOTE
Please call the patient regarding his recent lab work  His CBC was unremarkable  His CMP showed mild, but persistently elevated liver enzyme (ALT)  Please inform patient that I have ordered a chronic hepatitis panel and a RUQ ultrasound to be completed to further investigate for any underlying liver disease  For patient's Lipid Panel his triglycerides were mildly elevated  This may be due to diet  I would encourage patient to continue to try and improve diet and exercise frequently  Thank you

## 2022-09-25 ENCOUNTER — HOSPITAL ENCOUNTER (OUTPATIENT)
Dept: RADIOLOGY | Facility: HOSPITAL | Age: 42
Discharge: HOME | End: 2022-09-25
Payer: COMMERCIAL

## 2022-09-25 DIAGNOSIS — R74.01 ELEVATED ALANINE AMINOTRANSFERASE (ALT) LEVEL: ICD-10-CM

## 2022-09-25 PROCEDURE — 76705 ECHO EXAM OF ABDOMEN: CPT

## 2022-09-26 ENCOUNTER — TELEPHONE (OUTPATIENT)
Dept: INTERNAL MEDICINE CLINIC | Facility: CLINIC | Age: 42
End: 2022-09-26

## 2022-09-26 ENCOUNTER — APPOINTMENT (OUTPATIENT)
Dept: LAB | Facility: CLINIC | Age: 42
End: 2022-09-26
Payer: COMMERCIAL

## 2022-09-26 ENCOUNTER — CONSULT (OUTPATIENT)
Dept: INTERNAL MEDICINE CLINIC | Facility: CLINIC | Age: 42
End: 2022-09-26

## 2022-09-26 VITALS
TEMPERATURE: 98.2 F | SYSTOLIC BLOOD PRESSURE: 111 MMHG | HEART RATE: 66 BPM | BODY MASS INDEX: 30.46 KG/M2 | DIASTOLIC BLOOD PRESSURE: 70 MMHG | HEIGHT: 65 IN | OXYGEN SATURATION: 99 % | WEIGHT: 182.8 LBS

## 2022-09-26 DIAGNOSIS — E11.9 TYPE 2 DIABETES MELLITUS WITHOUT COMPLICATION, WITHOUT LONG-TERM CURRENT USE OF INSULIN (HCC): ICD-10-CM

## 2022-09-26 DIAGNOSIS — R74.01 ELEVATED ALANINE AMINOTRANSFERASE (ALT) LEVEL: ICD-10-CM

## 2022-09-26 DIAGNOSIS — Z01.818 PRE-OP TESTING: Primary | ICD-10-CM

## 2022-09-26 DIAGNOSIS — H11.002 PTERYGIUM OF LEFT EYE: ICD-10-CM

## 2022-09-26 LAB
HBV CORE AB SER QL: NORMAL
HBV CORE IGM SER QL: NORMAL
HBV SURFACE AG SER QL: NORMAL
HCV AB SER QL: NORMAL
SL AMB POCT HEMOGLOBIN AIC: 7.8 (ref ?–6.5)

## 2022-09-26 PROCEDURE — 36415 COLL VENOUS BLD VENIPUNCTURE: CPT

## 2022-09-26 PROCEDURE — 87340 HEPATITIS B SURFACE AG IA: CPT

## 2022-09-26 PROCEDURE — 86704 HEP B CORE ANTIBODY TOTAL: CPT

## 2022-09-26 PROCEDURE — 83036 HEMOGLOBIN GLYCOSYLATED A1C: CPT | Performed by: INTERNAL MEDICINE

## 2022-09-26 PROCEDURE — 86705 HEP B CORE ANTIBODY IGM: CPT

## 2022-09-26 PROCEDURE — 99212 OFFICE O/P EST SF 10 MIN: CPT | Performed by: INTERNAL MEDICINE

## 2022-09-26 PROCEDURE — 86803 HEPATITIS C AB TEST: CPT

## 2022-09-26 PROCEDURE — 93000 ELECTROCARDIOGRAM COMPLETE: CPT | Performed by: INTERNAL MEDICINE

## 2022-09-26 NOTE — TELEPHONE ENCOUNTER
[facility]- [facility]    Name of [address] [facility]    Form to be filled out by- Dr Daniella Rahman    Form to be Faxed [phone]    Patient made aware of 10 business day policy

## 2022-09-26 NOTE — PROGRESS NOTES
INTERNAL MEDICINE [facility]   [address], [address]    NAME: Darya Zelaya[address]  AGE: 36 y o  SEX: male    DATE OF ENCOUNTER: 9/26/2022    Assessment and Plan     1  Pre-op testing    Here for pre-op clearance for L eye pterygium  Disturbing field of vision  Surgery sched on Oct 12   EKG- NSR no ST elevation or depression  Low to moderate risk pt for a low risk surgery  May proceed with the procedure if BMP is also stable    Risk Factor Score (Yes=1, No=0)   Hx of TIA/CVA 0   Hx of prior ischemic heart disease (AMI, unstable angina, Q waves on EKG, CABG) 0   Hx of congestive heart failure 0   Serum Creatinine >2 mg/dl 0   Insulin dependent diabetes mellitus 1   Total Score 1     Risk of MACE (30-day)     Points Risk % (95% CI), Kia 2017 Risk % (95% CI) Jonas, 1999   0 3 9 (2 8-5 4) 0 4 (0 05-1 5)   1 6 (4 9-7 4) 0 9 (0 3-2 1)   2 10 1 (8 1-12 6) 6 6 (3 9-10 3)   3 or more 15 (11 1-20) >11 (5 8-18  4)       Advice    In patients with 3 or more RCRI risk factors (e g , diabetes mellitus, HF, coronary artery disease, renal insufficiency, cerebrovascular accident), it may be reasonable to begin beta blockers before surgery  (Level of Evidence: B)    Beta-blocker therapy should not be started on the day of surgery (Level of Evidence: B)    In patients with a compelling long-term indication for betablocker therapy but no other RCRI risk factors, initiating beta blockers in the perioperative setting as an approach to reduce perioperative risk is of uncertain benefit  (Level of Evidence: B)    - POCT ECG    2  Pterygium of left eye    See 1     3   Type 2 diabetes mellitus without complication, without long-term current use of insulin (HCC)    A1C 7 8 from 7 4  Compliant with insulin 15u BID with meals  Will do a 3 months follow up with logs for further evaluation and management of diabetes  Foot exam next visit   - POCT hemoglobin A1c    Orders Placed This Encounter   Procedures    POCT hemoglobin A1c    POCT ECG           Chief Complaint     Chief Complaint   Patient presents with    Pre-op Exam     Left eye Cataract surgery       History of Present Illness     HPI     Pt is a 37 y/o M w/ PMHx of T2DM w/ nephropathy, L ptreygium who presents here for a preop clearance  Pt has a ptregium on the left eye which started to disrupt his vision  He is getting it removed on October 12th  He is doing well overall  He is compliant with his medication / insulin therapy  His sugars are well controlled  He denies any alcohol / smoking or drug use  He denies any chest pain, sob, abdominal pain, nausea, vomiting, fever or chills  His EKG this visit was NSR  He will get the BMP later today  Will fax over the notes to the office  The following portions of the patient's history were reviewed and updated as appropriate: allergies, current medications, past family history, past medical history, past social history, past surgical history and problem list     Review of Systems     Review of Systems   Constitutional: Negative for chills, fatigue and unexpected weight change  HENT: Negative for congestion  Eyes: Positive for visual disturbance (L eye)  Respiratory: Negative for cough and wheezing  Cardiovascular: Negative for chest pain, palpitations and leg swelling  Gastrointestinal: Negative for abdominal distention, nausea and vomiting  Endocrine: Negative for polydipsia and polyuria  Genitourinary: Negative for decreased urine volume and dysuria  Neurological: Negative for syncope and weakness  Psychiatric/Behavioral: Negative for agitation         Active Problem List     Patient Active Problem List   Diagnosis    Acute bilateral low back pain without sciatica    Decreased vision    Dyspepsia    Overweight    Proteinuria    Pterygium of left eye    Dizziness    Type 2 diabetes mellitus without complication, without long-term current use of insulin ([address] )    Diabetes mellitus due to underlying condition with diabetic nephropathy, without long-term current use of insulin (HCC)    Viral URI with cough    Status post cystoscopy with ureteral stent placement    Pneumonia due to COVID-19 virus    Periodontitis       Objective     /70 (BP Location: Left arm, Patient Position: Sitting, Cuff Size: Large)   Pulse 66   Temp 98 2 °F (36 8 °C) (Temporal)   Ht 5' 5" (1 651 m)   Wt 82 9 kg (182 lb 12 8 oz)   SpO2 99%   BMI 30 42 kg/m²     Physical Exam  Constitutional:       General: He is not in acute distress  Appearance: He is not ill-appearing, toxic-appearing or diaphoretic  HENT:      Head: Normocephalic and atraumatic  Nose: No congestion or rhinorrhea  Eyes:        Comments: pterygium   Cardiovascular:      Rate and Rhythm: Normal rate and regular rhythm  Pulses: Normal pulses  Heart sounds: Normal heart sounds  No murmur heard  No friction rub  No gallop  Pulmonary:      Effort: Pulmonary effort is normal  No respiratory distress  Breath sounds: Normal breath sounds  No stridor  No wheezing, rhonchi or rales  Chest:      Chest wall: No tenderness  Abdominal:      General: Abdomen is flat  Bowel sounds are normal  There is no distension  Palpations: Abdomen is soft  There is no mass  Tenderness: There is no abdominal tenderness  There is no right CVA tenderness, left CVA tenderness or guarding  Hernia: No hernia is present  Musculoskeletal:      Right lower leg: No edema  Left lower leg: No edema  Neurological:      General: No focal deficit present  Mental Status: He is oriented to person, place, and time     Psychiatric:         Mood and Affect: Mood normal          Behavior: Behavior normal          Pertinent Laboratory/Diagnostic Studies:  CBC:   Lab Results   Component Value Date/Time    WBC 9 00 09/16/2022 08:41 AM    RBC 4 72 09/16/2022 08:41 AM    HGB 13 6 09/16/2022 08:41 AM    HCT 40 7 09/16/2022 08:41 AM    MCV 86 09/16/2022 08:41 AM    MCH 28 8 09/16/2022 08:41 AM    MCHC 33 4 09/16/2022 08:41 AM    RDW 12 9 09/16/2022 08:41 AM    MPV 12 2 09/16/2022 08:41 AM     09/16/2022 08:41 AM    NRBC 0 09/16/2022 08:41 AM    NEUTOPHILPCT 61 09/16/2022 08:41 AM    LYMPHOPCT 30 09/16/2022 08:41 AM    MONOPCT 8 09/16/2022 08:41 AM    EOSPCT 1 09/16/2022 08:41 AM    BASOPCT 0 09/16/2022 08:41 AM    NEUTROABS 5 44 09/16/2022 08:41 AM    LYMPHSABS 2 69 09/16/2022 08:41 AM    MONOSABS 0 74 09/16/2022 08:41 AM    EOSABS 0 07 09/16/2022 08:41 AM     Chemistry Profile:   Lab Results   Component Value Date/Time    K 3 8 09/16/2022 08:41 AM     09/16/2022 08:41 AM    CO2 21 09/16/2022 08:41 AM    BUN 18 09/16/2022 08:41 AM    CREATININE 0 76 09/16/2022 08:41 AM    GLUC 253 (H) 05/08/2020 07:06 AM    GLUF 152 (H) 09/16/2022 08:41 AM    CALCIUM 8 9 09/16/2022 08:41 AM    AST 34 09/16/2022 08:41 AM    ALT 94 (H) 09/16/2022 08:41 AM    ALKPHOS 108 09/16/2022 08:41 AM    EGFR 111 09/16/2022 08:41 AM     CBC:   Results from Last 12 Months   Lab Units 09/16/22  0841   WBC Thousand/uL 9 00   RBC Million/uL 4 72   HEMOGLOBIN g/dL 13 6   HEMATOCRIT % 40 7   MCV fL 86   MCH pg 28 8   MCHC g/dL 33 4   RDW % 12 9   MPV fL 12 2   PLATELETS Thousands/uL 295   NRBC AUTO /100 WBCs 0   NEUTROS PCT % 61   LYMPHS PCT % 30   MONOS PCT % 8   EOS PCT % 1   BASOS PCT % 0   NEUTROS ABS Thousands/µL 5 44   LYMPHS ABS Thousands/µL 2 69   MONOS ABS Thousand/µL 0 74   EOS ABS Thousand/µL 0 07     Chemistry Profile:   Results from Last 12 Months   Lab Units 09/16/22  0841   POTASSIUM mmol/L 3 8   CHLORIDE mmol/L 105   CO2 mmol/L 21   BUN mg/dL 18   CREATININE mg/dL 0 76   GLUCOSE FASTING mg/dL 152*   CALCIUM mg/dL 8 9   AST U/L 34   ALT U/L 94*   ALK PHOS U/L 108   EGFR ml/min/1 73sq m 111       Current Medications     Current Outpatient Medications:     atorvastatin (LIPITOR) 20 mg tablet, Take 1 tablet (20 mg total) by mouth daily, Disp: 30 tablet, Rfl: 3    Diclofenac Sodium (VOLTAREN) 1 %, Apply 2 g topically 4 (four) times a day, Disp: 350 g, Rfl: 0    INS SYRINGE/NEEDLE 1CC/28G 28G X 1/2" 1 ML MISC, Use 2 (two) times a day, Disp: 100 each, Rfl: 2    insulin NPH-insulin regular (NovoLIN 70/30 ReliOn) 100 units/mL subcutaneous injection, Inject 15 Units under the skin 2 (two) times a day before meals, Disp: 10 mL, Rfl: 5    lisinopril (ZESTRIL) 2 5 mg tablet, Take 1 tablet (2 5 mg total) by mouth daily, Disp: 30 tablet, Rfl: 3    metFORMIN (GLUCOPHAGE) 1000 MG tablet, Take 1 tablet (1,000 mg total) by mouth 2 (two) times a day with meals, Disp: 180 tablet, Rfl: 3    clotrimazole (LOTRIMIN) 1 % cream, Apply topically 2 (two) times a day Apply to penile area when needed twice per day for 7 days   (Patient not taking: Reported on 9/26/2022), Disp: 30 g, Rfl: 0    Health Maintenance     Health Maintenance   Topic Date Due    Dental Prophylaxis  Never done    COVID-19 Vaccine (1) Never done    Diabetic Foot Exam  01/13/2022    Influenza Vaccine (1) 09/01/2022    Depression Screening  10/19/2022    HEMOGLOBIN A1C  12/08/2022    BMI: Followup Plan  01/23/2023    DM Eye Exam  01/21/2023    Dental Periodic Exam  01/28/2023    BMI: Adult  06/08/2023    Annual Physical  06/08/2023    Dental X-Ray: Bitewings  07/28/2023    Dental X-Ray: Full Mouth  07/28/2025    DTaP,Tdap,and Td Vaccines (2 - Td or Tdap) 01/13/2031    HIV Screening  Completed    Hepatitis C Screening  Completed    Pneumococcal Vaccine: Pediatrics (0 to 5 Years) and At-Risk Patients (6 to 59 Years)  Completed    HIB Vaccine  Aged Out    Hepatitis B Vaccine  Aged Out    IPV Vaccine  Aged Out    Hepatitis A Vaccine  Aged Out    Meningococcal ACWY Vaccine  Aged Out    HPV Vaccine  Aged [employer] History   Administered Date(s) Administered    Influenza, recombinant, quadrivalent,injectable, preservative free 09/30/2020, 10/19/2021    Pneumococcal Conjugate Vaccine 20-valent (Pcv20), Polysace 06/08/2022    Pneumococcal Polysaccharide PPV23 01/13/2021    Tdap 01/13/2021       Elbert Clarke MD  PGY 3

## 2022-09-28 ENCOUNTER — OFFICE VISIT (OUTPATIENT)
Dept: DENTISTRY | Facility: CLINIC | Age: 42
End: 2022-09-28

## 2022-09-28 VITALS — SYSTOLIC BLOOD PRESSURE: 121 MMHG | TEMPERATURE: 98.4 F | HEART RATE: 76 BPM | DIASTOLIC BLOOD PRESSURE: 76 MMHG

## 2022-09-28 DIAGNOSIS — K02.9 DENTAL CARIES INTO PULP: Primary | ICD-10-CM

## 2022-09-28 PROCEDURE — D7140 EXTRACTION, ERUPTED TOOTH OR EXPOSED ROOT (ELEVATION AND/OR FORCEPS REMOVAL): HCPCS | Performed by: DENTIST

## 2022-09-29 NOTE — PROGRESS NOTES
Oral Surgery    [facility] presents for Ext #1,2     ASA: II   used     [address], patient denies any changes  Obtained a direct and personal consent  Risks and complications were explained  Pt agreed and consented  Consent scanned in doc center  Pre-Op BP WNL  Administered 2 cc of 4 % articaine w/ 1:100,000 epi via infiltration  ? Adequate anesthesia obtained, reflected gingiva, elevated, and extracted #1,2    Socket irrigated, and gauze placed for hemostasis  Upon dismissal, patient received POI    NV: SRP

## 2022-10-02 DIAGNOSIS — K76.0 FATTY LIVER: Primary | ICD-10-CM

## 2022-10-04 ENCOUNTER — TELEPHONE (OUTPATIENT)
Dept: GASTROENTEROLOGY | Facility: CLINIC | Age: 42
End: 2022-10-04

## 2022-10-04 ENCOUNTER — TELEPHONE (OUTPATIENT)
Dept: INTERNAL MEDICINE CLINIC | Facility: CLINIC | Age: 42
End: 2022-10-04

## 2022-10-04 NOTE — PROGRESS NOTES
[facility] - Outpatient Consultation  Tamie Crowell 36 y o  male MRN: [id number]  Encounter: [id number]    PCP:  Sandi Carlin MD, [phone]  Referring Provider:  Nanci Villafana, [address] [place] [address], [phone]    Patient: Tamie Crowell, [birth year]  Reason for Referral: fatty liver and abnormal liver tests     ASSESSMENT/PLAN:  36 y o  male with history of class I obesity, IDDM and nephrolithiasis who presents for initial evaluation for abnormal liver tests  He has no acute liver-specific complaints has no clinical evidence of hepatic decompensation  He was noted to abnormal liver tests dating to 2017 with peak ALT 130s  He has normal platelets and preserved liver synthetic function  Serologic workup negative for HBV and HCV infection and he had a RUQ U/S which showed hepatic steatosis  He denies excessive alcohol consumption as well as exposure to hepatotoxic medications  I suspect that he has NAFLD given his multiple metabolic risk factors  He does have prior history of elevated ferritin which is likely due to acute phase reactant, but will need to repeat with Tsat and send HFE mutation testing if >45%  His FIB-4 index is 0 51 and NFS is -2, suggestive of minimal fibrosis but will send elastography to complete staging  We discussed the natural history, prognosis, and complications of NAFLD, including progression to cirrhosis as well as increased risk for cardiovascular comorbidity  I recommend weight loss with goal of at least 5-10% of his body weight as well as aggressive modification of his metabolic risk factors  Will refer to endocrine as well as nutrition for dietary counseling and consideration of GLP1a given his uncontrolled diabetes  I advised that he should avoid interval weight gain, excessive EtOH consumption and hepatotoxic medications  He will follow up in 6 months or sooner  Thank you for the opportunity to consult in his care      - US elastography  - HAV IgG and HBsAb; vaccinate if non-immune  - Iron studies with ferritin  - Referral to endocrine and nutrition services       RTC in     Amilcar Gordon MD  Division of Gastroenterology and Hepatology    [address]    ============================================================================  CC/HPI: 36 y o  male with history of class I obesity, IDDM and nephrolithiasis who presents for initial evaluation for abnormal liver tests  He was noted to abnormal liver tests dating to 2017 with peak ALT 130s  Serologic workup negative for HBV and HCV infection and he had a RUQ U/S which showed hepatic steatosis  He denies excessive EtOH and denies any recent changes in medications  Regarding his weight, his peak weight is 180 lbs which has been stable over the years  He was born in [place] and emigrated to the [address] [place] [address] fifteen years ago  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  He denies a family history of liver disease or liver cancer  ROS: Complete review of systems otherwise negative       PAST MEDICAL/SURGICAL HISTORY:  Past Medical History:   Diagnosis Date    Cardiomegaly     Diabetes mellitus ([address] )         Past Surgical History:   Procedure Laterality Date    EYE SURGERY      FOR DECREASED VISION, PTERYGIUM    WV CYSTO/URETERO W/LITHOTRIPSY &INDWELL STENT INSRT Right 5/28/2019    Procedure: CYSTOSCOPY, URETEROSCOPY, STONE BASKET RETRIEVAL, AND INSERTION STENT URETERAL;  Surgeon: Jaleel Cabral MD;  Location: [facility];  Service: Urology       FAMILY/SOCIAL HISTORY:  Family History   Problem Relation Age of Onset    Diabetes Mother     Kidney disease Mother     Other Mother         UTERINE CYST    No Known Problems Father        Social History     Tobacco Use    Smoking status: Former Smoker    Smokeless tobacco: Former User    Tobacco comment: pt "quit 5 years ago"   Vaping Use    Vaping Use: Never used   Substance Use Topics    Alcohol use: Not Currently    Drug use: Never MEDICATIONS:  Current Outpatient Medications on File Prior to Visit   Medication Sig Dispense Refill    atorvastatin (LIPITOR) 20 mg tablet Take 1 tablet (20 mg total) by mouth daily 30 tablet 3    clotrimazole (LOTRIMIN) 1 % cream Apply topically 2 (two) times a day Apply to penile area when needed twice per day for 7 days  (Patient not taking: Reported on 9/26/2022) 30 g 0    Diclofenac Sodium (VOLTAREN) 1 % Apply 2 g topically 4 (four) times a day 350 g 0    INS SYRINGE/NEEDLE 1CC/28G 28G X 1/2" 1 ML MISC Use 2 (two) times a day 100 each 2    insulin NPH-insulin regular (NovoLIN 70/30 ReliOn) 100 units/mL subcutaneous injection Inject 15 Units under the skin 2 (two) times a day before meals 10 mL 5    lisinopril (ZESTRIL) 2 5 mg tablet Take 1 tablet (2 5 mg total) by mouth daily 30 tablet 3    metFORMIN (GLUCOPHAGE) 1000 MG tablet Take 1 tablet (1,000 mg total) by mouth 2 (two) times a day with meals 180 tablet 3     No current facility-administered medications on file prior to visit         Allergies   Allergen Reactions    Other      Annotation - [id number]: tilapia fish - swelling of throat/eyes       PHYSICAL EXAM:  /100 (BP Location: Left arm, Patient Position: Sitting, Cuff Size: Standard)   Pulse 64   Temp 97 9 °F (36 6 °C) (Tympanic)   Ht 5' 5" (1 651 m)   Wt 82 8 kg (182 lb 9 6 oz)   SpO2 96%   BMI 30 39 kg/m²   GENERAL: NAD, AAO  HEENT: anicteric, OP clear, MMM  PULMONARY: CTAB  CARDIAC: RRR, no murmurs  ABDOMEN: S/ND/NT, normoactive BS, no hepatomegaly, spleen not palpable  EXTREMITIES: no edema  SKIN: no rashes, no palmar erythema, no spider angiomata   NEURO: normal gait, no tremor, no asterixis     LABS/RADIOLOGY/ENDOSCOPY:  Lab Results   Component Value Date    WBC 9 00 09/16/2022    HGB 13 6 09/16/2022    HCT 40 7 09/16/2022     09/16/2022    GLUF 138 (H) 09/26/2022    BUN 14 09/26/2022    CREATININE 0 74 09/26/2022    K 3 7 09/26/2022     09/26/2022    CO2 23 09/26/2022    ALKPHOS 108 09/16/2022    ALT 94 (H) 09/16/2022    AST 34 09/16/2022    CALCIUM 9 1 09/26/2022    EGFR 112 09/26/2022    TRIG 196 (H) 09/16/2022    HDL 43 09/16/2022     A1c 7 8%    HBsAg-  HBcAb-  HCV Ab-    RUQ US (9/25/2022)  Mild infiltration of the liver    Computed MELD-Na score unavailable  Necessary lab results were not found in the last year  Computed MELD score unavailable  Necessary lab results were not found in the last year

## 2022-10-04 NOTE — TELEPHONE ENCOUNTER
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

## 2022-10-05 ENCOUNTER — OFFICE VISIT (OUTPATIENT)
Dept: GASTROENTEROLOGY | Facility: CLINIC | Age: 42
End: 2022-10-05
Payer: COMMERCIAL

## 2022-10-05 VITALS
HEART RATE: 64 BPM | TEMPERATURE: 97.9 F | SYSTOLIC BLOOD PRESSURE: 140 MMHG | WEIGHT: 182.6 LBS | HEIGHT: 65 IN | BODY MASS INDEX: 30.42 KG/M2 | DIASTOLIC BLOOD PRESSURE: 100 MMHG | OXYGEN SATURATION: 96 %

## 2022-10-05 DIAGNOSIS — R79.89 ABNORMAL LIVER FUNCTION TESTS: Primary | ICD-10-CM

## 2022-10-05 DIAGNOSIS — K76.0 NAFLD (NONALCOHOLIC FATTY LIVER DISEASE): ICD-10-CM

## 2022-10-05 DIAGNOSIS — E11.9 TYPE 2 DIABETES MELLITUS WITHOUT COMPLICATION, WITHOUT LONG-TERM CURRENT USE OF INSULIN (HCC): ICD-10-CM

## 2022-10-05 DIAGNOSIS — K76.0 FATTY LIVER: ICD-10-CM

## 2022-10-05 PROCEDURE — 99244 OFF/OP CNSLTJ NEW/EST MOD 40: CPT | Performed by: STUDENT IN AN ORGANIZED HEALTH CARE EDUCATION/TRAINING PROGRAM

## 2022-10-05 NOTE — PATIENT INSTRUCTIONS
Please get your blood work completed  We will schedule an ultrasound elastography of the liver  We are referring you to the Endocrine and Nutrition team   Try to lose at least 15 lbs     US scheduled on 10/12 at [facility]

## 2022-10-06 NOTE — TELEPHONE ENCOUNTER
[facility] called stating that Dr Afsaneh Nava did not indicate if patient was cleared or not for his upcoming eye surgery on the form  Please have Dr Afsaneh Nava just write on form if patient is cleared for surgery and fax back to [address] [facility] along with EKG results  Form is back in [facility] clinical folder

## 2022-10-06 NOTE — TELEPHONE ENCOUNTER
Form faxed along with EKG to [address] [facility], confirmation received and scanned to chart

## 2022-10-24 ENCOUNTER — HOSPITAL ENCOUNTER (OUTPATIENT)
Dept: RADIOLOGY | Facility: HOSPITAL | Age: 42
Discharge: HOME | End: 2022-10-24
Attending: STUDENT IN AN ORGANIZED HEALTH CARE EDUCATION/TRAINING PROGRAM
Payer: COMMERCIAL

## 2022-10-24 DIAGNOSIS — R79.89 ABNORMAL LIVER FUNCTION TESTS: ICD-10-CM

## 2022-10-24 DIAGNOSIS — K76.0 FATTY LIVER: ICD-10-CM

## 2022-10-24 PROCEDURE — 76981 USE PARENCHYMA: CPT

## 2022-11-07 ENCOUNTER — CONSULT (OUTPATIENT)
Dept: ENDOCRINOLOGY | Facility: CLINIC | Age: 42
End: 2022-11-07

## 2022-11-07 VITALS
WEIGHT: 182.2 LBS | DIASTOLIC BLOOD PRESSURE: 86 MMHG | SYSTOLIC BLOOD PRESSURE: 138 MMHG | HEART RATE: 78 BPM | BODY MASS INDEX: 30.32 KG/M2

## 2022-11-07 DIAGNOSIS — E78.2 MIXED HYPERLIPIDEMIA: ICD-10-CM

## 2022-11-07 DIAGNOSIS — E66.9 OBESITY (BMI 30-39.9): ICD-10-CM

## 2022-11-07 DIAGNOSIS — E11.9 TYPE 2 DIABETES MELLITUS WITHOUT COMPLICATION, WITHOUT LONG-TERM CURRENT USE OF INSULIN (HCC): Primary | ICD-10-CM

## 2022-11-07 DIAGNOSIS — K76.0 FATTY LIVER: ICD-10-CM

## 2022-11-07 DIAGNOSIS — I10 PRIMARY HYPERTENSION: ICD-10-CM

## 2022-11-07 DIAGNOSIS — R79.89 ABNORMAL LIVER FUNCTION TESTS: ICD-10-CM

## 2022-11-07 RX ORDER — INSULIN LISPRO 100 [IU]/ML
INJECTION, SUSPENSION SUBCUTANEOUS
Qty: 15 ML | Refills: 4 | Status: SHIPPED | OUTPATIENT
Start: 2022-11-07

## 2022-11-07 NOTE — PATIENT INSTRUCTIONS
Hypoglycemia instructions   Gale Valdes  [birth year]  [id number]    Low Blood Sugar    Steps to treat low blood sugar  1  Test blood sugar if you have symptoms of low blood sugar:   Low Blood Sugar Symptoms:  o Sweaty  o Dizzy  o Rapid heartbeat  o Shaky    o Bad mood  o Hungry      2  Treat blood sugar less than 70 with 15 grams of fast-acting carbohydrate:   Examples of 15 grams Fast-Acting Carbohydrate:  o 4 oz juice  o 4 oz regular soda  o 3-4 glucose tablets (chew)  o 3-4 hard candies (chew)              3    Wait 15 minutes and test your blood sugar again           4  If blood sugar is less than 100, repeat steps 2-3       5  When your blood sugar is 100 or more, eat a snack if it will be longer than one hour until your next meal  The snack should be 15 grams of carbohydrate and a protein:   Examples of snacks:  o ½ sandwich  o 6 crackers with cheese  o Piece of fruit with cheese or peanut butter  o 6 crackers with peanut butter     Hipoglucemia en candido persona con diabetes   LO QUE NECESITA SABER:   La hipoglucemia es candido afección grave que se produce cuando el nivel de glucosa (azúcar) en la jasmine baja demasiado  El nivel de azúcar en la jasmine generalmente es demasiado alto en candido persona con diabetes, yasmeen el azúcar en la jasmine también puede caer a un nivel excesivamente bajo  Es importante que usted siga alonso plan de manejo de la diabetes para mantener jeff alonso nivel de azúcar en la jasmine  INSTRUCCIONES SOBRE EL KVNG HOSPITALARIA:   Pídale a alguien que llame al número de emergencias local (911 en los [place]) si:  Usted tiene candido convulsión o se desmaya  Alonso nivel de glucosa en la jasmine es de menos de 50 mg/dL y no responde al tratamiento  Usted siente que se va a desmayar  Usted tiene dificultad para pensar con claridad  Llame a alonso médico o al equipo de atención diabética si:  Usted ha tenido síntomas de un descenso del nivel de azúcar varias veces      Quin Reyes tiene preguntas acerca de la cantidad de insulina o medicamento para la diabetes que está tomando  Usted tiene preguntas o inquietudes acerca de bunch condición o cuidado  Medicamentos:  Insulina o un medicamento para la diabetes ayuda a mantener imtiaz niveles de azúcar en la jasmine bajo control  El glucagón podría ser necesario si usted tiene hipoglucemia grave  [place] imtiaz medicamentos paz se le haya indicado  Consulte con bunch médico si usted aye que bunch medicamento no le está ayudando o si presenta efectos secundarios  Infórmele si es alérgico a cualquier medicamento  Mantenga candido lista actualizada de los [place], las vitaminas y los productos herbales que jenae  Incluya los siguientes datos de los medicamentos: cantidad, frecuencia y motivo de administración  Traiga con usted la lista o los envases de las píldoras a imtiaz citas de seguimiento  Lleve la lista de los medicamentos con usted en susi de candido emergencia  Manejo de la hipoglucemia:  Revise bunch nivel de azúcar en la jasmine inmediatamente si usted tiene síntomas de hipoglucemia  La hipoglucemia suele producirse cuando el nivel de azúcar en jamsine es de 70 mg/dL o inferior  Pregunte a bunch equipo de cuidado de la diabetes qué nivel de azúcar en la jasmine es demasiado bajo para usted  Si bunch nivel de azúcar en la jasmine está bajo, coma o tome 15 gramos de carbohidratos de acción rápida  Ejemplos de esta cantidad de carbohidratos de acción rápida son 4 onzas (1/2 taza) de jugo de fruta o 4 onzas de gaseosa regular  Otros ejemplos son 2 cucharadas de pasas de uva o 1 tubo de gel de glucosa  Revise bunch nivel de azúcar en la jasmine 15 minutos más tarde  Siéntese quieto Wheeler Mendoza  Si el nivel es todavía bajo (menos de 100 mg/dL), ingiera otros 15 gramos de carbohidratos  Cuando el nivel regresa a 100 mg/dl, coma candido comida si ya es hora  Si falta más de 1 hora para bunch hora de comida, coma un bocado   El bocado debe contener hidratos de [employer], paz los siguientes:    3/4 taza de cereal    1 taza de leche descremada o baja en grasa    6 galletas de agua saladas    1/2 sándwich de pavo    15 patatas fritas sin grasa  [place] ayudará a evitar otra caída de bunch nivel de azúcar en la jasmine  Siempre siga cuidadosamente las instrucciones de bunch equipo de cuidado de la diabetes acerca de cómo tratar los niveles bajos de azúcar en la jasmine  Siempre lleve consigo alguna mahesh de carbohidratos de acción rápida  Si usted presenta síntomas de hipoglucemia y no tiene un glucómetro, igual lleve consigo candido mahesh de carbohidratos de acción rápida  Evite carbohidratos de alimentos que son altos en grasa  El contenido de grasa podría hacer que el carbohidrato tarde más en subir bunch nivel de azúcar en la jasmine  Pregunte a bunch equipo de cuidado de la diabetes si usted debería llevar consigo un estuche de glucagón  El glucagón es un medicamento que se inyecta cuando usted desarrolla hipoglucemia grave y pierde el conocimiento  Revise la fecha de vencimiento todos los meses y reemplace el medicamento de bunch vencimiento  Enseñe a otras personas cómo ayudarlo si usted tiene síntomas de hipoglucemia  Infórmeles acerca de los síntomas de la hipoglucemia  Pídales que le den candido mahesh de carbohidratos de acción rápida si usted no puede autoadministrársela  Pídales que le apliquen candido inyección de glucagón si tiene signos de hipoglucemia y usted pierde el conocimiento o si tiene candido convulsión  Pídales que llamen al [place] de emergencias (911 en los [place])   [place] es candido emergencia  Indíqueles que nunca lo jamila que trague nada si se desmaya o si tiene candido convulsión  Use accesorios de alerta médica o lleve consigo candido tarjeta que indique que usted tiene diabetes  Pregunte en dónde puede conseguir estos artículos  Prevenga la hipoglucemia:  [employer] de diabetes paz se le indique  Use imtiaz medicamentos a la hora y en la cantidad exacta   No duplique la cantidad de [employer] jenae a menos que bunch equipo de cuidado de la diabetes se lo indique  Es posible que necesite medicamento oral para la diabetes, insulina o ambos para controlar imtiaz niveles de glucosa en jasmine  Bunch médico le indicará cómo y cuándo debe mikhail medicamento oral para la diabetes  También se le enseñarán los efectos secundarios que pueden causar los medicamentos orales para la diabetes  Se puede añadir insulina si los medicamentos orales para la diabetes pierden eficacia con el tiempo  La insulina puede administrarse mediante inyección o candido bomba de insulina o candido pluma  Usted y bunch equipo de atención analizarán qué método es mejor para usted  La bomba de insulina es un dispositivo implantado que le da insulina las 24 horas del día  Candido bomba de insulina previene la necesidad de inyecciones múltiples de insulina en un día  La pluma para insulina es un dispositivo precargado con la cantidad [place] de insulina  Coma las comidas y los tentempiés según las indicaciones  Hable con bunch nutricionista o equipo de cuidado de la diabetes acerca de un plan de alimentación adecuado para usted  No se salte ninguna comida  Revise bunch nivel de azúcar en la jasmine según indicaciones  Pregunte a bunch equipo de cuidado de la diabetes cuáles son los niveles de azúcar adecuados para usted antes y [address] comidas  Pregúntele cuándo y con qué frecuencia debe revisar bunch nivel de azúcar en la jasmine  Es posible que tenga que comprobar candido gota de Luís Coulter en candido máquina de análisis de glucosa  Es posible que necesite controlarlos al menos 3 veces al día  Registre el resultado de bunch nivel de azúcar en la jasmine y lleve bunch registro con usted cuando tenga candido abi con bunch equipo de cuidado de la diabetes  Podrían usarlo para hacer cambios a bunch medicamento, alimentación o itinerario de ejercicios   Es posible que bunch médico del equipo de cuidados de nathaniel le recomiende un monitor continuo de glucosa (MCG)  Un monitor continuo de glucosa es un dispositivo que se lleva puesto en todo momento  El monitor continuo de glucosa revisa bunch nivel de azúcar en la jasmine cada 5 minutos  [employer] a un dispositivo electrónico, paz un teléfono inteligente  Revise bunch nivel de azúcar en la jasmine antes de hacer ejercicio  La actividad física, paz el ejercicio, puede disminuir bunch nivel de azúcar en la jasmine  Si bunch nivel de azúcar en la jasmine es inferior a 100 mg /dL, consuma un bocadillo de carbohidratos  Ravi [place] son 4 a 6 galletas saladas, ½ plátano, 8 onzas (1 taza) de [employer] o del 1%, o 4 onzas de jugo (½ taza)  Si va a mantenerse Audience de 1 hora, revise bunch nivel de azúcar en la jasmine cada 30 minutos  Bunch equipo de cuidado de la diabetes también podría recomendarle que revise bunch nivel de azúcar en la jasmine después de la [place]  Conozca los riesgos si decide beber alcohol  El alcohol puede causar que imtiaz niveles de azúcar en la jasmine estén bajos si [place] insulina  El alcohol puede causar [employer] de azúcar en la jasmine y [place] de peso si julianna demasiado  [place] de [address] y los hombres de 72 años o más deben limitar el consumo de alcohol a 1 bebida por día  Los hombres de [address] de alcohol a 2 tragos al día  Un trago equivale a 12 onzas de cerveza, 5 onzas de vino o 1 onza y ½ de licor  [place] un seguimiento con bunch médico o el equipo de cuidado de la diabetes o un especialista según lo indicado: Es posible que tenga que cambiar bunch insulina o bunch medicamento para la diabetes si sigue teniendo episodios de hipoglucemia  Anote imtiaz preguntas para que se acuerde de hacerlas alma imtiaz visitas  © Copyright Marry [facility] 2022 Information is for End User's use only and may not be sold, redistributed or otherwise used for commercial purposes   All illustrations and images included in CareNotes® are the copyrighted property of A D A M , Inc  or [address] [place] [address] es sólo para uso en educación  Bunch intención no es darle un consejo médico sobre enfermedades o tratamientos  Colsulte con bunch Orlena Barban farmacéutico antes de seguir cualquier régimen médico para saber si es seguro y efectivo para usted

## 2022-11-07 NOTE — PROGRESS NOTES
Audrey Avery 36 y o  male MRN: [id number]    Encounter: [id number]      Assessment/Plan     Assessment: This is a 36y o -year-old male with diabetes with hyperglycemia  Plan:    Diagnoses and all orders for this visit:    Type 2 diabetes mellitus without complication, without long-term current use of insulin ([facility]  Lab Results   Component Value Date    HGBA1C 7 8 (A) 09/26/2022   A1c is 7 8%, uncontrolled  Blood sugars are also uncontrolled at home  Discussed with patient to switch to Humalog 75/25 insulin 15 units with breakfast and 15 units with dinner  Will start Victoza 0 6 mg once daily, which will also help with weight loss and improve glycemic control  Will obtain anti islet cell antibody, liz antibody and C-peptide to assess if patient has type 1 diabetes or type 2 diabetes  We discussed pathophysiology of type 2 diabetes, mechanism of action of insulin as well as other medications, importance of glycemic control, to goal of 6 5-7% to prevent diabetes complications such as neuropathy, nephropathy, coronary artery disease, stroke and retinopathy  Discussed importance of fingerstick monitoring, and sending log in 2 weeks  Discussed importance of follow-up appointment  Discussed hypoglycemia symptoms and treatment  Discussed importance of follow-up with Ophthalmology and podiatry  -     Ambulatory Referral to Endocrinology  -     Insulin Lispro Prot & Lispro (Insulin Lispro Prot & Lispro) (75-25) 100 units/mL injection pen; Inject 15 units before  breakfast and 15 units before  dinner  -     Basic metabolic panel; Future  -     Hemoglobin A1C; Future  -     Microalbumin / creatinine urine ratio; Future  -     Anti-islet cell antibody; Future  -     Glutamic acid decarboxylase; Future  -     C-peptide; Future  -     liraglutide (VICTOZA) injection; Inject 0 6 mg once a day ( subcutaneously)    Fatty liver  Patient is currently followed by GI    Starting Victoza will also help with fatty liver  -     Ambulatory Referral to Endocrinology    Abnormal liver function tests  Followed by GI  -     Ambulatory Referral to Endocrinology    Mixed hyperlipidemia  Currently on statins  Continue statins and lifestyle modifications  Primary hypertension  Blood pressure is slightly elevated today  Discussed the goal for blood pressure is less than 130/80 millimeter of Hg  Obesity (BMI 30-39  9)  Start Victoza 0 6 milligram once a day, increase the dose to 1 2 milligram after 4 weeks if it is well tolerated  Educated about lifestyle modifications      CC: Diabetes    History of Present Illness     HPI:  Ila Kidd- [id number]     Toni Rudolph is [id number] gentleman with medical history of class 1 obesity, diabetes insulin dependent, history of nephrolithiasis, abnormal liver function test is referred here for management of diabetes  He denies recent hospitalization for hyperglycemia or hypoglycemia  Patient denies history of alcohol use  He was evaluated by Gastroenterology for elevated liver enzymes and was attributed to [address]   He has history of type 2 diabetes for at least 8 years  he has been using insulin for 6 yrs  Current regimen is Novolin 70/30 insulin, 15 units twice a day  He takes metformin 1000 mg twice a day  Does not check blood sugars regularly  He checks blood sugars, 2-3 times daily, his blood sugars are usually in 150-250 mg/dL range  He Is currently injecting with syringes  For hypertension he takes Zestril 2 5 mg daily  For hyperlipidemia, he takes Lipitor 20 mg daily  BP Readings from Last 3 Encounters:   11/07/22 138/86   10/05/22 140/100   09/28/22 121/76     Lab Results   Component Value Date    HGBA1C 7 8 (A) 09/26/2022     Lab Results   Component Value Date    CREATININE 0 74 09/26/2022            Review of Systems   Constitutional: Positive for unexpected weight change  Negative for activity change, diaphoresis, fatigue and fever  HENT: Negative  Eyes: Negative for visual disturbance  Respiratory: Negative for cough, chest tightness and shortness of breath  Cardiovascular: Negative for chest pain, palpitations and leg swelling  Gastrointestinal: Negative for abdominal pain, blood in stool, constipation, diarrhea, nausea and vomiting  Endocrine: Negative for cold intolerance, heat intolerance, polydipsia, polyphagia and polyuria  Genitourinary: Negative for dysuria, enuresis, frequency and urgency  Musculoskeletal: Negative for arthralgias and myalgias  Skin: Negative for pallor, rash and wound  Allergic/Immunologic: Negative  Neurological: Negative for dizziness, tremors, weakness and numbness  Hematological: Negative  Psychiatric/Behavioral: Negative          Historical Information   Past Medical History:   Diagnosis Date   • Cardiomegaly    • Diabetes mellitus ([facility] )      Past Surgical History:   Procedure Laterality Date   • EYE SURGERY      FOR DECREASED VISION, PTERYGIUM   • IN CYSTO/URETERO W/LITHOTRIPSY &INDWELL STENT INSRT Right 5/28/2019    Procedure: CYSTOSCOPY, URETEROSCOPY, STONE BASKET RETRIEVAL, AND INSERTION STENT URETERAL;  Surgeon: Job Wooten MD;  Location: [facility];  Service: Urology     Social History   Social History     Substance and Sexual Activity   Alcohol Use Not Currently     Social History     Substance and Sexual Activity   Drug Use Never     Social History     Tobacco Use   Smoking Status Former Smoker   Smokeless Tobacco Former User   Tobacco Comment    pt "quit 5 years ago"     Family History:   Family History   Problem Relation Age of Onset   • Diabetes Mother    • Kidney disease Mother    • Other Mother         UTERINE CYST   • No Known Problems Father        Meds/Allergies   Current Outpatient Medications   Medication Sig Dispense Refill   • atorvastatin (LIPITOR) 20 mg tablet Take 1 tablet (20 mg total) by mouth daily 30 tablet 3   • Diclofenac Sodium (VOLTAREN) 1 % Apply 2 g topically 4 (four) times a day 350 g 0   • INS SYRINGE/NEEDLE 1CC/28G 28G X 1/2" 1 ML MISC Use 2 (two) times a day 100 each 2   • Insulin Lispro Prot & Lispro (Insulin Lispro Prot & Lispro) (75-25) 100 units/mL injection pen Inject 15 units before  breakfast and 15 units before  dinner 15 mL 4   • lisinopril (ZESTRIL) 2 5 mg tablet Take 1 tablet (2 5 mg total) by mouth daily 30 tablet 3   • metFORMIN (GLUCOPHAGE) 1000 MG tablet Take 1 tablet (1,000 mg total) by mouth 2 (two) times a day with meals 180 tablet 3   • clotrimazole (LOTRIMIN) 1 % cream Apply topically 2 (two) times a day Apply to penile area when needed twice per day for 7 days  (Patient not taking: No sig reported) 30 g 0   • liraglutide (VICTOZA) injection Inject 0 6 mg once a day ( subcutaneously) 6 mL 5     No current facility-administered medications for this visit  Allergies   Allergen Reactions   • Other      Annotation - [id number]: tilapia fish - swelling of throat/eyes       Objective   Vitals: Blood pressure 138/86, pulse 78, weight 82 6 kg (182 lb 3 2 oz)  Physical Exam  Vitals reviewed  Constitutional:       General: He is not in acute distress  Appearance: Normal appearance  He is well-developed  He is obese  He is not diaphoretic  HENT:      Head: Normocephalic and atraumatic  Nose: Nose normal       Mouth/Throat:      Mouth: Mucous membranes are moist    Eyes:      General:         Right eye: No discharge  Left eye: No discharge  Conjunctiva/sclera: Conjunctivae normal       Pupils: Pupils are equal, round, and reactive to light  Neck:      Thyroid: No thyromegaly  Trachea: No tracheal deviation  Cardiovascular:      Rate and Rhythm: Normal rate and regular rhythm  Heart sounds: Normal heart sounds  No murmur heard  No friction rub  Pulmonary:      Effort: Pulmonary effort is normal  No respiratory distress  Breath sounds: Normal breath sounds  No wheezing or rales     Chest:      Chest wall: No tenderness  Abdominal:      General: Bowel sounds are normal  There is no distension  Palpations: Abdomen is soft  Tenderness: There is no abdominal tenderness  Musculoskeletal:         General: No tenderness or deformity  Normal range of motion  Cervical back: Normal range of motion and neck supple  Lymphadenopathy:      Cervical: No cervical adenopathy  Skin:     General: Skin is warm and dry  Coloration: Skin is not pale  Findings: No erythema or rash  Neurological:      General: No focal deficit present  Mental Status: He is alert and oriented to person, place, and time  Motor: No abnormal muscle tone  Coordination: Coordination normal       Deep Tendon Reflexes: Reflexes are normal and symmetric  Reflexes normal    Psychiatric:         Mood and Affect: Mood normal          Behavior: Behavior normal          The history was obtained from the review of the chart, patient  Lab Results:   Lab Results   Component Value Date/Time    Hemoglobin A1C 7 8 (A) 09/26/2022 09:27 AM    Hemoglobin A1C 7 4 (A) 06/08/2022 09:10 AM    Hemoglobin A1C 8 7 (A) 01/21/2022 09:27 AM    WBC 9 00 09/16/2022 08:41 AM    Hemoglobin 13 6 09/16/2022 08:41 AM    Hematocrit 40 7 09/16/2022 08:41 AM    MCV 86 09/16/2022 08:41 AM    Platelets 007 69/92/5988 08:41 AM    BUN 14 09/26/2022 10:06 AM    BUN 18 09/16/2022 08:41 AM    Potassium 3 7 09/26/2022 10:06 AM    Potassium 3 8 09/16/2022 08:41 AM    Chloride 106 09/26/2022 10:06 AM    Chloride 105 09/16/2022 08:41 AM    CO2 23 09/26/2022 10:06 AM    CO2 21 09/16/2022 08:41 AM    Creatinine 0 74 09/26/2022 10:06 AM    Creatinine 0 76 09/16/2022 08:41 AM    AST 34 09/16/2022 08:41 AM    ALT 94 (H) 09/16/2022 08:41 AM    Albumin 3 9 09/16/2022 08:41 AM    HDL, Direct 43 09/16/2022 08:41 AM    Triglycerides 196 (H) 09/16/2022 08:41 AM           Imaging Studies: I have personally reviewed pertinent reports        Portions of the record may have been created with voice recognition software  Occasional wrong word or "sound a like" substitutions may have occurred due to the inherent limitations of voice recognition software  Read the chart carefully and recognize, using context, where substitutions have occurred

## 2022-12-20 ENCOUNTER — OFFICE VISIT (OUTPATIENT)
Dept: DENTISTRY | Facility: CLINIC | Age: 42
End: 2022-12-20

## 2022-12-20 VITALS — SYSTOLIC BLOOD PRESSURE: 122 MMHG | DIASTOLIC BLOOD PRESSURE: 76 MMHG | TEMPERATURE: 97.7 F | HEART RATE: 64 BPM

## 2022-12-20 DIAGNOSIS — Z01.21 ENCOUNTER FOR DENTAL EXAMINATION AND CLEANING WITH ABNORMAL FINDINGS: Primary | ICD-10-CM

## 2022-12-20 RX ORDER — POLYMYXIN B SULFATE AND TRIMETHOPRIM 1; 10000 MG/ML; [USP'U]/ML
SOLUTION OPHTHALMIC
COMMUNITY
Start: 2022-10-04

## 2022-12-20 RX ORDER — PREDNISOLONE ACETATE 10 MG/ML
SUSPENSION/ DROPS OPHTHALMIC
COMMUNITY
Start: 2022-10-04

## 2022-12-20 RX ORDER — KETOROLAC TROMETHAMINE 5 MG/ML
SOLUTION OPHTHALMIC
COMMUNITY
Start: 2022-10-04

## 2022-12-20 NOTE — PROGRESS NOTES
Reviewed hhx in Epic   ASA: II    Patient presents for SRP in quads :UR    Anesthesia-  Topical gel benzocaine 20% was applied to tissue  1 carp of 3% Carbo w/o epi was given via short needle  Type of injection:Buccal infiltrations  Neg aspirations and no complications for all  Cavitron and hand instruments used  Bleeding: Mod  Supra/sub calculus was removed from tooth surfaces  Irrigated with Chlor Gluconate 0 12%    OHI: reviewed with the patient the need to maintain proper oral hygiene regimen at home  Brushing 2x/day for 2 minutes, flossing subgingivally daily and mouthrinse 1-2x/day for 60 seconds  Advised patient periodontal disease is an oral disease we can treat and maintain but cannot cure  Without proper dental visits and proper at home dental care this disease may return  Patient understands  Following scaling and root planing, the patient will continue on 3-4 month periodontal maintenance appointments       NV: SRP LR  NV2:SRP UL and LL/If 90 mins  NV3:4-6 week re alkia Fernandez RD

## 2023-01-17 ENCOUNTER — OFFICE VISIT (OUTPATIENT)
Dept: INTERNAL MEDICINE CLINIC | Facility: CLINIC | Age: 43
End: 2023-01-17

## 2023-01-17 VITALS
OXYGEN SATURATION: 98 % | SYSTOLIC BLOOD PRESSURE: 115 MMHG | TEMPERATURE: 97.9 F | WEIGHT: 181.8 LBS | HEIGHT: 65 IN | HEART RATE: 60 BPM | BODY MASS INDEX: 30.29 KG/M2 | DIASTOLIC BLOOD PRESSURE: 67 MMHG

## 2023-01-17 DIAGNOSIS — J12.82 PNEUMONIA DUE TO COVID-19 VIRUS: ICD-10-CM

## 2023-01-17 DIAGNOSIS — E08.21 DIABETES MELLITUS DUE TO UNDERLYING CONDITION WITH DIABETIC NEPHROPATHY, WITHOUT LONG-TERM CURRENT USE OF INSULIN (HCC): ICD-10-CM

## 2023-01-17 DIAGNOSIS — U07.1 PNEUMONIA DUE TO COVID-19 VIRUS: ICD-10-CM

## 2023-01-17 DIAGNOSIS — U07.1 COVID-19: ICD-10-CM

## 2023-01-17 DIAGNOSIS — E11.9 TYPE 2 DIABETES MELLITUS WITHOUT COMPLICATION, WITHOUT LONG-TERM CURRENT USE OF INSULIN (HCC): Primary | ICD-10-CM

## 2023-01-17 DIAGNOSIS — Z59.9 FINANCIAL DIFFICULTIES: ICD-10-CM

## 2023-01-17 LAB — SL AMB POCT HEMOGLOBIN AIC: 8.6 (ref ?–6.5)

## 2023-01-17 RX ORDER — ATORVASTATIN CALCIUM 20 MG/1
20 TABLET, FILM COATED ORAL DAILY
Qty: 30 TABLET | Refills: 3 | Status: SHIPPED | OUTPATIENT
Start: 2023-01-17

## 2023-01-17 RX ORDER — LISINOPRIL 2.5 MG/1
2.5 TABLET ORAL DAILY
Qty: 30 TABLET | Refills: 3 | Status: SHIPPED | OUTPATIENT
Start: 2023-01-17

## 2023-01-17 RX ORDER — INSULIN LISPRO 100 [IU]/ML
INJECTION, SUSPENSION SUBCUTANEOUS
Qty: 15 ML | Refills: 4 | Status: SHIPPED | OUTPATIENT
Start: 2023-01-17

## 2023-01-17 SDOH — ECONOMIC STABILITY - INCOME SECURITY: PROBLEM RELATED TO HOUSING AND ECONOMIC CIRCUMSTANCES, UNSPECIFIED: Z59.9

## 2023-01-17 NOTE — PROGRESS NOTES
INTERNAL MEDICINE [address] [address] [address] [place] [address]    NAME: Darya Zelaya,# 28  AGE: 39 y o  SEX: male    DATE OF ENCOUNTER: 1/17/2023    Assessment and Plan     1  Type 2 diabetes mellitus without complication, without long-term current use of insulin ([facility])    Compliant w/ metformin 1g BID, lispro prot/lispro 15u BID  Victoza was not not approved and following diet   Denies any polyuria / polydipsea / hypoglycemic episode  Sugars run usually in 140s  Forgot to bring the long  Follows with endo  Repeat A1C 7 8 from 7 8  Foot exam this visit  Will not make any adjustment now   Follow up with Endo next month    Follow up with us in 3-6 months    - POCT hemoglobin A1c  - metFORMIN (GLUCOPHAGE) 1000 MG tablet; Take 1 tablet (1,000 mg total) by mouth 2 (two) times a day with meals  Dispense: 180 tablet; Refill: 3  - Insulin Lispro Prot & Lispro (Insulin Lispro Prot & Lispro) (75-25) 100 units/mL injection pen; Inject 15 units before  breakfast and 15 units before  dinner  Dispense: 15 mL; Refill: 4    2  Financial difficulties    - Ambulatory referral to social work care management program; Future    3  Diabetes mellitus due to underlying condition with diabetic nephropathy, without long-term current use of insulin (HCC)    - lisinopril (ZESTRIL) 2 5 mg tablet; Take 1 tablet (2 5 mg total) by mouth daily  Dispense: 30 tablet; Refill: 3    4  COVID-19    - atorvastatin (LIPITOR) 20 mg tablet; Take 1 tablet (20 mg total) by mouth daily  Dispense: 30 tablet; Refill: 3    5  Pneumonia due to COVID-19 virus    - atorvastatin (LIPITOR) 20 mg tablet; Take 1 tablet (20 mg total) by mouth daily  Dispense: 30 tablet;  Refill: 3    Orders Placed This Encounter   Procedures   • POCT hemoglobin A1c       - Counseling Documentation: patient was counseled regarding: diagnostic results, instructions for management and risk factor reductions    Chief Complaint Chief Complaint   Patient presents with   • Follow-up     DM f/u       History of Present Illness     HPI    Pt is a 39 M w/ PMHx of T2DM w/ nephropathy, L ptreygium who presents for diabetes follow up  Compliant w/ metformin 1g BID, lispro prot/lispro 15u BID  Victoza was not not approved and following diet   Denies any polyuria / polydipsea / hypoglycemic episode  Sugars run usually in 140s  Forgot to bring the long  Follows with endo  Repeat A1C 7 8 from 7 8  Appt with endo next month with labs  Will follow up in 3-6 months  Refills provided  Depression Screening Follow-up Plan: Patient's depression screening was positive with a PHQ-2 score of   Their PHQ-9 score was   Clinically patient does not have depression  No treatment is required  The following portions of the patient's history were reviewed and updated as appropriate: allergies, current medications, past family history, past medical history, past social history, past surgical history and problem list     Review of Systems     Review of Systems   Constitutional: Negative for fatigue and unexpected weight change  HENT: Negative for drooling  Eyes: Negative for pain and visual disturbance  Respiratory: Negative for cough and wheezing  Cardiovascular: Negative for chest pain, palpitations and leg swelling  Gastrointestinal: Negative for abdominal distention, abdominal pain, nausea and vomiting  Endocrine: Negative for polyuria  Genitourinary: Negative for decreased urine volume and dysuria  Neurological: Negative for weakness         Active Problem List     Patient Active Problem List   Diagnosis   • Acute bilateral low back pain without sciatica   • Decreased vision   • Dyspepsia   • Overweight   • Proteinuria   • Pterygium of left eye   • Dizziness   • Type 2 diabetes mellitus without complication, without long-term current use of insulin (HCC)   • Diabetes mellitus due to underlying condition with diabetic nephropathy, without long-term current use of insulin (HCC)   • Viral URI with cough   • Status post cystoscopy with ureteral stent placement   • Pneumonia due to COVID-19 virus   • Periodontitis       Objective     /67 (BP Location: Left arm, Patient Position: Sitting, Cuff Size: Standard)   Pulse 60   Temp 97 9 °F (36 6 °C) (Temporal)   Ht 5' 5" (1 651 m)   Wt 82 5 kg (181 lb 12 8 oz)   SpO2 98%   BMI 30 25 kg/m²     Physical Exam  Constitutional:       General: He is not in acute distress  Appearance: He is not ill-appearing, toxic-appearing or diaphoretic  HENT:      Head: Normocephalic and atraumatic  Nose: No congestion or rhinorrhea  Cardiovascular:      Rate and Rhythm: Normal rate and regular rhythm  Pulses: Normal pulses  Heart sounds: No murmur heard  No friction rub  No gallop  Pulmonary:      Effort: Pulmonary effort is normal  No respiratory distress  Breath sounds: Normal breath sounds  No wheezing, rhonchi or rales  Chest:      Chest wall: No tenderness  Abdominal:      General: Bowel sounds are normal  There is no distension  Palpations: There is no mass  Tenderness: There is no abdominal tenderness  There is no right CVA tenderness, left CVA tenderness, guarding or rebound  Hernia: No hernia is present  Musculoskeletal:      Right lower leg: No edema  Left lower leg: No edema  Skin:     Findings: No lesion  Neurological:      General: No focal deficit present  Mental Status: He is oriented to person, place, and time     Psychiatric:         Mood and Affect: Mood normal          Behavior: Behavior normal          Pertinent Laboratory/Diagnostic Studies:  CBC:   Lab Results   Component Value Date/Time    WBC 9 00 09/16/2022 08:41 AM    RBC 4 72 09/16/2022 08:41 AM    HGB 13 6 09/16/2022 08:41 AM    HCT 40 7 09/16/2022 08:41 AM    MCV 86 09/16/2022 08:41 AM    MCH 28 8 09/16/2022 08:41 AM    MCHC 33 4 09/16/2022 08:41 AM    RDW 12 9 09/16/2022 08:41 AM    MPV 12 2 09/16/2022 08:41 AM     09/16/2022 08:41 AM    NRBC 0 09/16/2022 08:41 AM    NEUTOPHILPCT 61 09/16/2022 08:41 AM    LYMPHOPCT 30 09/16/2022 08:41 AM    MONOPCT 8 09/16/2022 08:41 AM    EOSPCT 1 09/16/2022 08:41 AM    BASOPCT 0 09/16/2022 08:41 AM    NEUTROABS 5 44 09/16/2022 08:41 AM    LYMPHSABS 2 69 09/16/2022 08:41 AM    MONOSABS 0 74 09/16/2022 08:41 AM    EOSABS 0 07 09/16/2022 08:41 AM     Chemistry Profile:   Lab Results   Component Value Date/Time    K 3 7 09/26/2022 10:06 AM     09/26/2022 10:06 AM    CO2 23 09/26/2022 10:06 AM    BUN 14 09/26/2022 10:06 AM    CREATININE 0 74 09/26/2022 10:06 AM    GLUC 253 (H) 05/08/2020 07:06 AM    GLUF 138 (H) 09/26/2022 10:06 AM    CALCIUM 9 1 09/26/2022 10:06 AM    AST 34 09/16/2022 08:41 AM    ALT 94 (H) 09/16/2022 08:41 AM    ALKPHOS 108 09/16/2022 08:41 AM    EGFR 112 09/26/2022 10:06 AM     Coagulation Studies: No results found for: PROTIME, INR, PTT  CBC:   Results from Last 12 Months   Lab Units 09/16/22  0841   WBC Thousand/uL 9 00   RBC Million/uL 4 72   HEMOGLOBIN g/dL 13 6   HEMATOCRIT % 40 7   MCV fL 86   MCH pg 28 8   MCHC g/dL 33 4   RDW % 12 9   MPV fL 12 2   PLATELETS Thousands/uL 295   NRBC AUTO /100 WBCs 0   NEUTROS PCT % 61   LYMPHS PCT % 30   MONOS PCT % 8   EOS PCT % 1   BASOS PCT % 0   NEUTROS ABS Thousands/µL 5 44   LYMPHS ABS Thousands/µL 2 69   MONOS ABS Thousand/µL 0 74   EOS ABS Thousand/µL 0 07     Chemistry Profile:   Results from Last 12 Months   Lab Units 09/26/22  1006 09/16/22  0841   POTASSIUM mmol/L 3 7 3 8   CHLORIDE mmol/L 106 105   CO2 mmol/L 23 21   BUN mg/dL 14 18   CREATININE mg/dL 0 74 0 76   GLUCOSE FASTING mg/dL 138* 152*   CALCIUM mg/dL 9 1 8 9   AST U/L  --  34   ALT U/L  --  94*   ALK PHOS U/L  --  108   EGFR ml/min/1 73sq m 112 111     Coagulation Studies: No results for input(s): PROTIME, INR, PTT in the last 8784 hours      Current Medications     Current Outpatient Medications:   •  atorvastatin (LIPITOR) 20 mg tablet, Take 1 tablet (20 mg total) by mouth daily, Disp: 30 tablet, Rfl: 3  •  Diclofenac Sodium (VOLTAREN) 1 %, Apply 2 g topically 4 (four) times a day, Disp: 350 g, Rfl: 0  •  INS SYRINGE/NEEDLE 1CC/28G 28G X 1/2" 1 ML MISC, Use 2 (two) times a day, Disp: 100 each, Rfl: 2  •  Insulin Lispro Prot & Lispro (Insulin Lispro Prot & Lispro) (75-25) 100 units/mL injection pen, Inject 15 units before  breakfast and 15 units before  dinner, Disp: 15 mL, Rfl: 4  •  lisinopril (ZESTRIL) 2 5 mg tablet, Take 1 tablet (2 5 mg total) by mouth daily, Disp: 30 tablet, Rfl: 3  •  metFORMIN (GLUCOPHAGE) 1000 MG tablet, Take 1 tablet (1,000 mg total) by mouth 2 (two) times a day with meals, Disp: 180 tablet, Rfl: 3  •  clotrimazole (LOTRIMIN) 1 % cream, Apply topically 2 (two) times a day Apply to penile area when needed twice per day for 7 days  (Patient not taking: Reported on 9/26/2022), Disp: 30 g, Rfl: 0  •  ERROR: CANNOT USE RATIO BASED PRESCRIPTION MIXTURE NAMING FOR A NON-MIXTURE, Take 1 tablet by mouth every morning (Patient not taking: Reported on 1/17/2023), Disp: , Rfl:   •  ketorolac (ACULAR) 0 5 % ophthalmic solution, ONE DROP IN THE SURGICAL EYE 4 TIMES A DAY STARTING 3 DAYS PRIOR TO SURGERY  SEPARATE DROPS BY 5 MIN (Patient not taking: Reported on 1/17/2023), Disp: , Rfl:   •  liraglutide (VICTOZA) injection, Inject 0 6 mg once a day ( subcutaneously) (Patient not taking: Reported on 1/17/2023), Disp: 6 mL, Rfl: 5  •  polymyxin b-trimethoprim (POLYTRIM) ophthalmic solution, PLACE 1 DROP INTO EYE(S) 4 TIMES A DAY, STARTING 3 DAYS BEFORE SURGERY  SEPARATE DROPS BY 5 MINUTES (Patient not taking: Reported on 1/17/2023), Disp: , Rfl:   •  prednisoLONE acetate (PRED FORTE) 1 % ophthalmic suspension, PLACE 1 DROP IN SURGERY EYE 4 TIMES A DAY  START 3 DAYS BEFORE SURGERY DATE   SEPARATE DROPS BY 5 MIN (Patient not taking: Reported on 1/17/2023), Disp: , Rfl:     Health Maintenance     Health Maintenance   Topic Date Due   • Dental Prophylaxis  Never done   • COVID-19 Vaccine (1) Never done   • Diabetic Foot Exam  01/13/2022   • Influenza Vaccine (1) 09/01/2022   • Depression Screening  10/19/2022   • DM Eye Exam  01/21/2023   • BMI: Followup Plan  01/23/2023   • HEMOGLOBIN A1C  03/26/2023   • Dental Periodic Exam  01/28/2023   • Annual Physical  06/08/2023   • Kidney Health Evaluation: Microalbumin/Creatinine Ratio  06/08/2023   • Dental X-Ray: Bitewings  07/28/2023   • Kidney Health Evaluation: GFR  09/26/2023   • BMI: Adult  01/17/2024   • Dental X-Ray: Full Mouth  07/28/2025   • DTaP,Tdap,and Td Vaccines (2 - Td or Tdap) 01/13/2031   • HIV Screening  Completed   • Hepatitis C Screening  Completed   • Pneumococcal Vaccine: Pediatrics (0 to 5 Years) and At-Risk Patients (6 to 59 Years)  Completed   • HIB Vaccine  Aged Out   • IPV Vaccine  Aged Out   • Hepatitis A Vaccine  Aged Out   • Meningococcal ACWY Vaccine  Aged Out   • HPV Vaccine  Aged [employer] History   Administered Date(s) Administered   • Influenza, recombinant, quadrivalent,injectable, preservative free 09/30/2020, 10/19/2021   • Pneumococcal Conjugate Vaccine 20-valent (Pcv20), Polysace 06/08/2022   • Pneumococcal Polysaccharide PPV23 01/13/2021   • Tdap 01/13/2021       Milan Harada, MD  PGY 3

## 2023-01-18 ENCOUNTER — CLINICAL SUPPORT (OUTPATIENT)
Dept: NUTRITION | Facility: HOSPITAL | Age: 43
End: 2023-01-18
Attending: STUDENT IN AN ORGANIZED HEALTH CARE EDUCATION/TRAINING PROGRAM

## 2023-01-18 DIAGNOSIS — K76.0 FATTY LIVER: ICD-10-CM

## 2023-01-18 DIAGNOSIS — E11.9 TYPE 2 DIABETES MELLITUS WITHOUT COMPLICATION, WITHOUT LONG-TERM CURRENT USE OF INSULIN (HCC): ICD-10-CM

## 2023-01-18 DIAGNOSIS — R79.89 ABNORMAL LIVER FUNCTION TESTS: ICD-10-CM

## 2023-01-18 NOTE — PROGRESS NOTES
Nutrition Assessment Form    Patient Name: Luis Enrique Adams    YOB: 1980    Sex: Male     Assessment Date: 1/18/2023  Start Time: 3pm Stop Time: 3:30pm Total Minutes: 30     Data:  Present at session: self   Parent/Patient Concerns/reason for visit: Wants to know what he should eat and how much to eat   Medical Dx/Reason for Referral: K76 0 fatty liver, R 79 89 Abnormal liver function test, E11 9   Past Medical History:   Diagnosis Date   • Cardiomegaly    • Diabetes mellitus ([address] )        Current Outpatient Medications   Medication Sig Dispense Refill   • atorvastatin (LIPITOR) 20 mg tablet Take 1 tablet (20 mg total) by mouth daily 30 tablet 3   • Diclofenac Sodium (VOLTAREN) 1 % Apply 2 g topically 4 (four) times a day 350 g 0   • INS SYRINGE/NEEDLE 1CC/28G 28G X 1/2" 1 ML MISC Use 2 (two) times a day 100 each 2   • Insulin Lispro Prot & Lispro (Insulin Lispro Prot & Lispro) (75-25) 100 units/mL injection pen Inject 15 units before  breakfast and 15 units before  dinner 15 mL 4   • liraglutide (VICTOZA) injection Inject 0 6 mg once a day ( subcutaneously) (Patient not taking: Reported on 1/17/2023) 6 mL 5   • lisinopril (ZESTRIL) 2 5 mg tablet Take 1 tablet (2 5 mg total) by mouth daily 30 tablet 3   • metFORMIN (GLUCOPHAGE) 1000 MG tablet Take 1 tablet (1,000 mg total) by mouth 2 (two) times a day with meals 180 tablet 3     No current facility-administered medications for this visit          Additional Meds/Supplements: none   Special Learning Needs/barriers to learning/any new barriers Colombian speaking, used language line for Colombian interpretor   Height: HC Readings from Last 5 Encounters:   No data found for [facility] [place]       Weight: Wt Readings from Last 10 Encounters:   01/17/23 82 5 kg (181 lb 12 8 oz)   11/07/22 82 6 kg (182 lb 3 2 oz)   10/05/22 82 8 kg (182 lb 9 6 oz)   09/26/22 82 9 kg (182 lb 12 8 oz)   06/08/22 81 8 kg (180 lb 6 4 oz)   01/21/22 83 9 kg (185 lb)   10/19/21 82 4 kg (181 lb 9 6 oz)   01/13/21 86 5 kg (190 lb 9 6 oz)   09/30/20 83 kg (182 lb 15 7 oz)   06/16/20 79 kg (174 lb 2 6 oz)     Estimated body mass index is 30 25 kg/m² as calculated from the following:    Height as of 1/17/23: 5' 5" (1 651 m)  Weight as of 1/17/23: 82 5 kg (181 lb 12 8 oz)  Recent Weight Change: []Yes     [x]No  Amount: Weight is stable      Energy Needs: [place]  Jeor Equation:     Allergies   Allergen Reactions   • Other      Annotation - [id number]: tilapia fish - swelling of throat/eyes    or intolerances    Social History     Substance and Sexual Activity   Alcohol Use Not Currently    _______x/wk or month  1 or 2 or 3 or 4 or____ drinks/session   Mixed drinks/ wine/ beer    None currently but in the past drank a lot of alcohol reported that he had lost his mother in the past and was depressed and drank a lot of alcohol, but none recently   Social History     Tobacco Use   Smoking Status Former   Smokeless Tobacco Former   Tobacco Comments    pt "quit 5 years ago"       Who shops? patient   Who cooks/cooking methods   patient  Cooking methods: bake/london/air london/grill/boil/other________  Take out: ___ x/wk or month Dining out ____ x/wk or month   Exercise: Goes to gym some cardio and some machines 10 mins jogging, 30 mins lifting with machines, 10 mins ab 5 x week   Prior Nutritional Counseling?  []Yes     [x]No  When:      Why:         Diet Hx:  Breakfast: Coffee w/ teaspoon sugar, crackers   Lunch:     Meats, salad, tortillas, pasta     Dinner:    tortillas, eggs, yogurt    Beverages, diet soda, oj, water   Snacks: AM -   PM -   HS - recently stopped snacking        Nutrition Diagnosis:   Food and nutrition related knowledge deficit  related to Lack of prior exposure to accurate nutrition related information as evidenced by Conditions associated with diagnosis or treatment       Any change or new dx since previous visit:     Nutrition Diagnosis:         Medical Nutrition Therapy Intervention:  [x]Individualized Meal Plan- Used language line for - Discussed the plate method of portioning foods, including half a plate fruits and vegetables or a half plate all vegetables, 1/4 of the plate a lean protein source or meat, and a 1/4 of the plate being a whole grain carb- usually 1/2-1c  This should be followed for at least 2 meals of the day, but could also be followed for all 3  Reviewed CHO and non CHO foods, encouraged high fiber  Avoid sweetened beverages, consume 64oz of water a day  Reviewed healthy and non healthy fats  [x]Understanding Lab Values     [x]Basic Pathophysiology of Disease-discussed DM []Food/Medication Interactions   []Food Diary [x]Exercise 150 mins of moderate activity weekly, discussed importance of variety of activity, including wt bearing activities 2-3x/wk x 10-15mins  Discussed importance of activity throughout the lifecycle and its impact on overall health/stress management, etc    []Lifestyle/Behavior Modification Techniques []Medication, Mechanism of Action   []Label Reading: CHO/ Na/ Fat/ other_________ []Self Blood Glucose Monitoring   []Weight/BMI Goals: gain/lose/maintain []Other -    Other Notes/Assessment:       Comprehension: []Excellent  []Very Good  [x]Good  []Fair   []Poor    Receptivity: []Excellent  []Very Good  [x]Good  []Fair   []Poor    Expected Compliance: []Excellent  []Very Good  [x]Good  []Fair   []Poor        Goals:  1  Consume 3 balanced meals, using my plate method by f/u   2  Decreased saturated fat intake and replace with healthy fats by f/u   3  Improve HbA1c by f/u       No follow-ups on file    Labs:  CMP  Lab Results   Component Value Date    K 3 7 09/26/2022     09/26/2022    CO2 23 09/26/2022    BUN 14 09/26/2022    CREATININE 0 74 09/26/2022    GLUF 138 (H) 09/26/2022    CALCIUM 9 1 09/26/2022    AST 34 09/16/2022    ALT 94 (H) 09/16/2022    ALKPHOS 108 09/16/2022    EGFR 112 09/26/2022       BMP  Lab Results Component Value Date    CALCIUM 9 1 09/26/2022    K 3 7 09/26/2022    CO2 23 09/26/2022     09/26/2022    BUN 14 09/26/2022    CREATININE 0 74 09/26/2022       Lipids  No results found for: CHOL  Lab Results   Component Value Date    HDL 43 09/16/2022    HDL 40 10/11/2017     Lab Results   Component Value Date    LDLCALC 56 09/16/2022    LDLCALC 88 10/11/2017     Lab Results   Component Value Date    TRIG 196 (H) 09/16/2022    TRIG 146 10/11/2017     No results found for: CHOLHDL    Hemoglobin A1C  Lab Results   Component Value Date    HGBA1C 8 6 (A) 01/17/2023       Fasting Glucose  Lab Results   Component Value Date    GLUF 138 (H) 09/26/2022       Insulin     Thyroid  No results found for: TSH, G2ILWHJ, K0MYHCA, THYROIDAB    Hepatic Function Panel  Lab Results   Component Value Date    ALT 94 (H) 09/16/2022    AST 34 09/16/2022    ALKPHOS 108 09/16/2022       Celiac Disease Antibody Panel  No results found for: ENDOMYSIAL IGA, GLIADIN IGA, GLIADIN IGG, IGA, TISSUE TRANSGLUT AB, TTG IGA   Iron  Lab Results   Component Value Date    FERRITIN 65 (H) 05/08/2020            Kerry Adrian, RD  [facility]  [address] [address]

## 2023-02-08 ENCOUNTER — OFFICE VISIT (OUTPATIENT)
Dept: DENTISTRY | Facility: CLINIC | Age: 43
End: 2023-02-08

## 2023-02-08 VITALS — DIASTOLIC BLOOD PRESSURE: 75 MMHG | SYSTOLIC BLOOD PRESSURE: 118 MMHG | TEMPERATURE: 97.8 F | HEART RATE: 71 BPM

## 2023-02-08 DIAGNOSIS — K05.30 PERIODONTITIS: Primary | ICD-10-CM

## 2023-02-08 NOTE — DENTAL PROCEDURE DETAILS
SRP in quads : LR    Reviewed meds/hhx in Epic  ASA: III Uncontrolled type 2 diabetes    Anesthesia-  Topical gel benzocaine 20% was applied to tissue  Elly B infiltrations, short, 1 carp 4% Articaine w/ epi 1:100,000  Neg aspirations and no complications for all  Cavitron and hand instruments used  Bleeding: heavy  Supra/sub calculus was removed from tooth surfaces  Irrigated w/  12% Chlorhexidine Gluconate    OHI: reviewed with the patient the need to maintain proper oral hygiene regimen at home  Brushing 2x/day for 2 minutes, flossing subgingivally daily and mouthrinse 1-2x/day for 60 seconds  Advised patient periodontal disease is an oral disease we can treat and maintain but cannot cure  Without proper dental visits and proper at home dental care this disease may return  Patient understands  Following scaling and root planing, a 4-6 week Perio Re-eval is recommended, to evaluate whether patient will need further periodontal surgery  Recommend maintaining 3/4 month recalls      NV: SRP UL  2) SRP LL

## 2023-02-24 ENCOUNTER — PATIENT OUTREACH (OUTPATIENT)
Dept: INTERNAL MEDICINE CLINIC | Facility: CLINIC | Age: 43
End: 2023-02-24

## 2023-02-24 NOTE — PROGRESS NOTES
SW has reached out to pt via  # [phone] to inquire if he is in need of community services  Pt is a 38 y/o Malay speaking male single male  Pt not eligible for ongoing MA/ FOREST  He does have [employer]   Pt shared he was at work and there was not a good phone connection  He requested I call him back on Monday  He did share he needs help to f/u re eye surgery he had done at [facility]    He was able to share their # [phone]  SW will f/u with pt on Monday as per his request     ADDENDUM: 2/28/23    SW called via  ID #[id number]  Pt did indicate getting f/u eye care is his greatest concern  Pt is not eligibe for ongoing MA orACA  SW has [facility] out [facility] [address] as well as   SW has provided contact info for same  SW has attempted to help pt  reach his eye care Provider the # he Provided [phone]  This is the # for the [address] [facility]  AMBREEN had to leave a message for them and asked that they call pt to re-schedule  Pt had to cancel late for 2 appointments s/p surgery due to being sick and having an accident  SW did relate to pt that he may need a new referral to the their office  Ambreen will ask PCP re same and see if he can place an order  Sw will also alert the Referral Team and our Financial Counselors pt is asking for help with same  Pt shares that he resides with his Brother and that friend help him get to appointments   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

## 2023-03-01 DIAGNOSIS — H54.7 DECREASED VISION: Primary | ICD-10-CM

## 2023-03-16 ENCOUNTER — OFFICE VISIT (OUTPATIENT)
Dept: ENDOCRINOLOGY | Facility: CLINIC | Age: 43
End: 2023-03-16

## 2023-03-16 VITALS
HEART RATE: 88 BPM | DIASTOLIC BLOOD PRESSURE: 80 MMHG | HEIGHT: 65 IN | SYSTOLIC BLOOD PRESSURE: 110 MMHG | TEMPERATURE: 98 F | BODY MASS INDEX: 29.82 KG/M2 | WEIGHT: 179 LBS

## 2023-03-16 DIAGNOSIS — E11.9 TYPE 2 DIABETES MELLITUS WITHOUT COMPLICATION, WITHOUT LONG-TERM CURRENT USE OF INSULIN (HCC): Primary | ICD-10-CM

## 2023-03-16 DIAGNOSIS — E78.2 MIXED HYPERLIPIDEMIA: ICD-10-CM

## 2023-03-16 DIAGNOSIS — R80.9 MICROALBUMINURIA: ICD-10-CM

## 2023-03-16 NOTE — PROGRESS NOTES
Patient Progress Note      CC: DM   [place]: [id number] & [id number]    Referring Provider  No referring provider defined for this encounter  History of Present Illness:   Bianca Diaz is a 39 y o  male with a history of type 2 diabetes with long term use of insulin  Diabetes course has been stable  He has mild URI symptoms  Denies recent severe hypoglycemic or severe hyperglycemic episodes  Denies any issues with his current regimen  Home glucose monitoring: are performed regularly    No log or meter today - states he forgot to bring it with him, but can drop it off next week  Home blood glucose readings: he reports most readings are in the 100s mg/dl range with occasional reading over 200 mg/dl     Current regimen: Humalog 75/25 15 units twice daily with meals (he has been out of insulin for 20 days - states even though refill was requested through PCP it was never sent), metformin 1000 mg twice a day, Victoza 0 6 mg daily (not taking due to coverage)  compliant most of the time, denies any side effects from medications  Hypoglycemic episodes: No, rare  H/o of hypoglycemia causing hospitalization or Intervention such as glucagon injection  or ambulance call :  No  Hypoglycemia symptoms: never less than 70 mg/dl  Treatment of hypoglycemia: discussed treatment    Medic alert tag: recommended: Yes    Diabetes education: saw nutritionist  Diet: 1-2 meals per day, 2-3 snacks per day  Timing of meals is predictable  Diabetic diet compliance:  noncompliant some of the time  Admits to drinking juice sometimes  Activity: Daily activity is predictable: Yes  No exercise  [facility]: October 2022  Podiatry: N/A    Has hypertension: on ACE inhibitor/ARB, compliant most of the time  Has hyperlipidemia: on statin - tolerating well, no myalgias  compliant most of the time, denies any side effects from medications    Thyroid disorders: No  History of pancreatitis: No    Patient Active Problem List Diagnosis   • Acute bilateral low back pain without sciatica   • Decreased vision   • Dyspepsia   • Overweight   • Microalbuminuria   • Pterygium of left eye   • Dizziness   • Type 2 diabetes mellitus without complication, without long-term current use of insulin (HCC)   • Diabetes mellitus due to underlying condition with diabetic nephropathy, without long-term current use of insulin (HCC)   • Viral URI with cough   • Status post cystoscopy with ureteral stent placement   • Pneumonia due to COVID-19 virus   • Periodontitis   • Mixed hyperlipidemia      Past Medical History:   Diagnosis Date   • Cardiomegaly    • Diabetes mellitus ([address] )       Past Surgical History:   Procedure Laterality Date   • EYE SURGERY      FOR DECREASED VISION, PTERYGIUM   • DE CYSTO/URETERO W/LITHOTRIPSY &INDWELL STENT INSRT Right 5/28/2019    Procedure: CYSTOSCOPY, URETEROSCOPY, STONE BASKET RETRIEVAL, AND INSERTION STENT URETERAL;  Surgeon: Sandy Salmeron MD;  Location: [facility];  Service: Urology      Family History   Problem Relation Age of Onset   • Diabetes Mother    • Kidney disease Mother    • Other Mother         UTERINE CYST   • No Known Problems Father      Social History     Tobacco Use   • Smoking status: Former   • Smokeless tobacco: Former   • Tobacco comments:     pt "quit 5 years ago"   Substance Use Topics   • Alcohol use: Not Currently     Allergies   Allergen Reactions   • Fish Allergy - Food Allergy Anaphylaxis     Tilapia-facial swelling   • Other      Annotation - [id number]: tilapia fish - swelling of throat/eyes         Current Outpatient Medications:   •  atorvastatin (LIPITOR) 20 mg tablet, Take 1 tablet (20 mg total) by mouth daily, Disp: 30 tablet, Rfl: 3  •  Diclofenac Sodium (VOLTAREN) 1 %, Apply 2 g topically 4 (four) times a day, Disp: 350 g, Rfl: 0  •  INS SYRINGE/NEEDLE 1CC/28G 28G X 1/2" 1 ML MISC, Use 2 (two) times a day, Disp: 100 each, Rfl: 2  •  liraglutide (VICTOZA) injection, Inject 0 6 mg once a day ( subcutaneously), Disp: 6 mL, Rfl: 5  •  lisinopril (ZESTRIL) 2 5 mg tablet, Take 1 tablet (2 5 mg total) by mouth daily, Disp: 30 tablet, Rfl: 3  •  metFORMIN (GLUCOPHAGE) 1000 MG tablet, Take 1 tablet (1,000 mg total) by mouth 2 (two) times a day with meals, Disp: 180 tablet, Rfl: 3  •  Insulin Lispro Prot & Lispro (Insulin Lispro Prot & Lispro) (75-25) 100 units/mL injection pen, Inject 15 units before  breakfast and 15 units before  dinner (Patient not taking: Reported on 3/16/2023), Disp: 15 mL, Rfl: 4  Review of Systems   Constitutional: Negative for activity change, appetite change, fatigue and unexpected weight change  HENT: Negative for trouble swallowing  Eyes: Negative for visual disturbance  Respiratory: Negative for shortness of breath  Cardiovascular: Negative for chest pain and palpitations  Gastrointestinal: Negative for constipation and diarrhea  Endocrine: Negative for polydipsia and polyuria  Musculoskeletal: Negative  Skin: Negative for wound  Neurological: Negative for numbness  Psychiatric/Behavioral: Negative  Physical Exam:  Body mass index is 29 79 kg/m²  /80   Pulse 88   Temp 98 °F (36 7 °C) (Skin)   Ht 5' 5" (1 651 m)   Wt 81 2 kg (179 lb)   BMI 29 79 kg/m²    Wt Readings from Last 3 Encounters:   03/16/23 81 2 kg (179 lb)   01/17/23 82 5 kg (181 lb 12 8 oz)   11/07/22 82 6 kg (182 lb 3 2 oz)       Physical Exam  Vitals and nursing note reviewed  Constitutional:       Appearance: He is well-developed  HENT:      Head: Normocephalic  Eyes:      General: No scleral icterus  Pupils: Pupils are equal, round, and reactive to light  Neck:      Thyroid: No thyromegaly  Cardiovascular:      Rate and Rhythm: Normal rate and regular rhythm  Pulses:           Radial pulses are 2+ on the right side and 2+ on the left side  Heart sounds: No murmur heard  Pulmonary:      Effort: Pulmonary effort is normal  No respiratory distress  Breath sounds: Normal breath sounds  No wheezing  Musculoskeletal:      Cervical back: Neck supple  Skin:     General: Skin is warm and dry  Neurological:      Mental Status: He is alert  Patient's shoes and socks were not removed            Labs:   Component      Latest Ref Rng & Units 6/8/2022 9/16/2022 9/26/2022   Sodium      135 - 147 mmol/L  135 138   Potassium      3 5 - 5 3 mmol/L  3 8 3 7   Chloride      96 - 108 mmol/L  105 106   CO2      21 - 32 mmol/L  21 23   Anion Gap      4 - 13 mmol/L  9 9   BUN      5 - 25 mg/dL  18 14   Creatinine      0 60 - 1 30 mg/dL  0 76 0 74   GLUCOSE FASTING      65 - 99 mg/dL  152 (H) 138 (H)   Calcium      8 3 - 10 1 mg/dL  8 9 9 1   AST      5 - 45 U/L  34    ALT      12 - 78 U/L  94 (H)    Alkaline Phosphatase      46 - 116 U/L  108    Total Protein      6 4 - 8 4 g/dL  7 8    Albumin      3 5 - 5 0 g/dL  3 9    TOTAL BILIRUBIN      0 20 - 1 00 mg/dL  0 71    eGFR      ml/min/1 73sq m  111 112   Cholesterol      See Comment mg/dL  138    Triglycerides      See Comment mg/dL  196 (H)    HDL      >=40 mg/dL  43    LDL Calculated      0 - 100 mg/dL  56    Non-HDL Cholesterol      mg/dl  95    EXT Creatinine Urine      mg/dL 250 0     MICROALBUM ,U,RANDOM      0 0 - 20 0 mg/L 205 0 (H)     MICROALBUMIN/CREATININE RATIO      0 - 30 mg/g creatinine 82 (H)     Hemoglobin A1C      6 5 7 4 (A)       Component      Latest Ref Rng & Units 1/17/2023   Sodium      135 - 147 mmol/L    Potassium      3 5 - 5 3 mmol/L    Chloride      96 - 108 mmol/L    CO2      21 - 32 mmol/L    Anion Gap      4 - 13 mmol/L    BUN      5 - 25 mg/dL    Creatinine      0 60 - 1 30 mg/dL    GLUCOSE FASTING      65 - 99 mg/dL    Calcium      8 3 - 10 1 mg/dL    AST      5 - 45 U/L    ALT      12 - 78 U/L    Alkaline Phosphatase      46 - 116 U/L    Total Protein      6 4 - 8 4 g/dL    Albumin      3 5 - 5 0 g/dL    TOTAL BILIRUBIN      0 20 - 1 00 mg/dL    eGFR      ml/min/1 73sq m Cholesterol      See Comment mg/dL    Triglycerides      See Comment mg/dL    HDL      >=40 mg/dL    LDL Calculated      0 - 100 mg/dL    Non-HDL Cholesterol      mg/dl    EXT Creatinine Urine      mg/dL    MICROALBUM ,U,RANDOM      0 0 - 20 0 mg/L    MICROALBUMIN/CREATININE RATIO      0 - 30 mg/g creatinine    Hemoglobin A1C      6 5 8 6 (A)       Plan:    Diagnoses and all orders for this visit:    Type 2 diabetes mellitus without complication, without long-term current use of insulin ([facility])  HGA1C 8 6%  Worsened  Treatment regimen: per patient readings have been mostly in the 100s mg/dl even without using insulin  Advised patient to continue checking blood sugars twice a day and he will send a log next week  We will determine at that time if it is necessary to restart insulin or if diabetes can try to be managed with oral medication  Also advised patient complete labs  Discussed intensive insulin regimen does increase risk for hypoglycemia  Episodes of hypoglycemia can lead to permanent disability and death  Discussed risks/complications associated with uncontrolled diabetes  Advised to adhere to diabetic diet, and recommended staying active/exercising routinely as tolerated  Keep carbohydrates consistent to limit blood glucose fluctuations  Advised to call if blood sugars less than 70 mg/dl or over 300 mg/dl  Check blood glucose 2+ times a day  Discussed symptoms and treatment of hypoglycemia  Discussed use of CGM to collect additional blood glucose data to reveal trends and patterns that can be used to optimize treatment plan  Referred to diabetes/nutrition education  Recommended routine follow-up with podiatry and ophthalmology  Ordered blood work to complete now and prior to next visit  -     Hemoglobin A1C; Future  -     Basic metabolic panel;  Future    Mixed hyperlipidemia  LDL previously 56  On statin therapy  Managed by PCP    Microalbuminuria  On low-dose lisinopril  Discussed importance of improved blood glucose control        Discussed with the patient diagnosis and treatment and all questions fully answered  He will call me if any problems arise  Counseled patient on diagnostic results, prognosis, risk and benefit of treatment options, instruction for management, importance of treatment compliance, risk factor reduction and impressions        Kassidy Cervantes PA-C

## 2023-03-16 NOTE — PATIENT INSTRUCTIONS
Hypoglycemia instructions   Melida Carty  [birth year]  [id number]    Low Blood Sugar    Steps to treat low blood sugar  1  Test blood sugar if you have symptoms of low blood sugar:   Low Blood Sugar Symptoms:  o Sweaty  o Dizzy  o Rapid heartbeat  o Shaky  o Bad mood  o Hungry      2  Treat blood sugar less than 70 with 15 grams of fast-acting carbohydrate:   Examples of 15 grams Fast-Acting Carbohydrate:  o 4 oz juice  o 4 oz regular soda  o 3-4 glucose tablets (chew)  o 3-4 hard candies (chew)          3  Wait 15 minutes and test your blood sugar again     4   If blood sugar is less than 100, repeat steps 2-3     5  When your blood sugar is 100 or more, eat a snack if it will be longer than one hour until your next meal  The snack should be 15 grams of carbohydrate and a protein:   Examples of snacks:  o ½ sandwich  o 6 crackers with cheese  o Piece of fruit with cheese or peanut butter  o 6 crackers with peanut butter

## 2023-03-29 ENCOUNTER — OFFICE VISIT (OUTPATIENT)
Dept: DENTISTRY | Facility: CLINIC | Age: 43
End: 2023-03-29

## 2023-03-29 VITALS — SYSTOLIC BLOOD PRESSURE: 128 MMHG | DIASTOLIC BLOOD PRESSURE: 73 MMHG | HEART RATE: 86 BPM | TEMPERATURE: 98.7 F

## 2023-03-29 DIAGNOSIS — K04.4 ACUTE APICAL PERIODONTITIS OF PULPAL ORIGIN: Primary | ICD-10-CM

## 2023-03-29 NOTE — DENTAL PROCEDURE DETAILS
Reviewed hhx  ASA: III  Uncontrolled diabetes  Explained to patient that healing response may be delayed due to A1C numbers  Pain 0/10  Pts  CC - Patient is doing well following SRPs on right side  Patient presents for SRP in quads :UL    Anesthesia-  Topical gel benzocaine 20% was applied to tissue  Septocaine with epi 4% 1:100,000   1 5 carps were given via short needle  Type of injection:Infiltrations  Neg aspirations and no complications for all  Cavitron and hand instruments used  Bleeding: slight - moderate  Supra/sub calculus was removed from tooth surfaces    OHI: reviewed with the patient the need to maintain proper oral hygiene regimen at home  Brushing 2x/day for 2 minutes, flossing subgingivally daily and mouthrinse 1-2x/day for 60 seconds  Showed patient how to floss properly subgingivally while they watched in the mirror  Advised patient periodontal disease is an oral disease we can treat and maintain but cannot cure  Without proper dental visits and proper at home dental care this disease may return  Patient understands  Following scaling and root planing, pt will return for 4-6 week perio re-eval  If healing is sufficient, the patient will then continue on 3 month periodontal maintenance appointments  If healing is insufficient, pt may need to be assessed for gingival flap surgery  Pt dismissed in good health, no complications and all questions answered  Pt informed of anesthetic lasting a couple hours after procedure and to be careful eating/chewing until anesthetic has worn off  NV:LL SRP  NV2:Assessment following SRPs in all quads    NV3:Start restorations

## 2023-03-30 DIAGNOSIS — E08.21 DIABETES MELLITUS DUE TO UNDERLYING CONDITION WITH DIABETIC NEPHROPATHY, WITHOUT LONG-TERM CURRENT USE OF INSULIN (HCC): ICD-10-CM

## 2023-03-30 DIAGNOSIS — U07.1 PNEUMONIA DUE TO COVID-19 VIRUS: ICD-10-CM

## 2023-03-30 DIAGNOSIS — J12.82 PNEUMONIA DUE TO COVID-19 VIRUS: ICD-10-CM

## 2023-03-30 DIAGNOSIS — U07.1 COVID-19: ICD-10-CM

## 2023-03-30 RX ORDER — ATORVASTATIN CALCIUM 20 MG/1
TABLET, FILM COATED ORAL
Qty: 90 TABLET | Refills: 2 | Status: SHIPPED | OUTPATIENT
Start: 2023-03-30

## 2023-03-30 RX ORDER — LISINOPRIL 2.5 MG/1
TABLET ORAL
Qty: 90 TABLET | Refills: 2 | Status: SHIPPED | OUTPATIENT
Start: 2023-03-30

## 2023-06-07 ENCOUNTER — OFFICE VISIT (OUTPATIENT)
Dept: DENTISTRY | Facility: CLINIC | Age: 43
End: 2023-06-07

## 2023-06-07 VITALS — DIASTOLIC BLOOD PRESSURE: 78 MMHG | SYSTOLIC BLOOD PRESSURE: 118 MMHG | HEART RATE: 54 BPM

## 2023-06-07 DIAGNOSIS — K04.4 ACUTE APICAL PERIODONTITIS OF PULPAL ORIGIN: Primary | ICD-10-CM

## 2023-06-07 PROCEDURE — D4341 PERIODONTAL SCALING AND ROOT PLANING - 4 OR MORE TEETH PER QUADRANT: HCPCS

## 2023-06-07 NOTE — DENTAL PROCEDURE DETAILS
Reviewed hhx   ASA: II  Pain:0/10  Pts CC:Nothing at this time  Patient presents for SRP in quads :LL    Anesthesia-  Topical gel benzocaine 20% was applied to tissue  Septocaine with epi 4% 1:100,000   1 5 carp was given via short needle  Type of injection:Infiltrations  Neg aspirations and no complications for all  Cavitron and hand instruments used  Bleeding: Moderate  Supra/sub calculus was removed from tooth surfaces    OHI: reviewed with the patient the need to maintain proper oral hygiene regimen at home  Brushing 2x/day for 2 minutes, flossing subgingivally daily and mouthrinse 1-2x/day for 60 seconds  Advised patient periodontal disease is an oral disease we can treat and maintain but cannot cure  Without proper dental visits and proper at home dental care this disease may return  Patient understands  Following scaling and root planing, pt will return for 4-6 week perio re-eval  If healing is sufficient, the patient will then continue on 3 month periodontal maintenance appointments  If healing is insufficient, pt may need to be assessed for gingival flap surgery  Pt dismissed in good health, no complications and all questions answered  Pt informed of anesthetic lasting a couple hours after procedure and to be careful eating/chewing until anesthetic has worn off       NV: Assessment following SRPs

## 2023-08-02 ENCOUNTER — OFFICE VISIT (OUTPATIENT)
Dept: INTERNAL MEDICINE CLINIC | Facility: CLINIC | Age: 43
End: 2023-08-02

## 2023-08-02 ENCOUNTER — APPOINTMENT (OUTPATIENT)
Dept: LAB | Facility: CLINIC | Age: 43
End: 2023-08-02
Payer: COMMERCIAL

## 2023-08-02 VITALS
HEIGHT: 65 IN | DIASTOLIC BLOOD PRESSURE: 82 MMHG | TEMPERATURE: 98.1 F | BODY MASS INDEX: 29.49 KG/M2 | SYSTOLIC BLOOD PRESSURE: 131 MMHG | HEART RATE: 59 BPM | WEIGHT: 177 LBS

## 2023-08-02 DIAGNOSIS — R80.9 MICROALBUMINURIA: ICD-10-CM

## 2023-08-02 DIAGNOSIS — E11.9 TYPE 2 DIABETES MELLITUS WITHOUT COMPLICATION, WITHOUT LONG-TERM CURRENT USE OF INSULIN (HCC): ICD-10-CM

## 2023-08-02 DIAGNOSIS — Z00.00 ANNUAL PHYSICAL EXAM: Primary | ICD-10-CM

## 2023-08-02 DIAGNOSIS — R79.89 ABNORMAL LIVER FUNCTION TESTS: ICD-10-CM

## 2023-08-02 DIAGNOSIS — E78.2 MIXED HYPERLIPIDEMIA: ICD-10-CM

## 2023-08-02 DIAGNOSIS — K76.0 FATTY LIVER: ICD-10-CM

## 2023-08-02 DIAGNOSIS — K21.9 GASTROESOPHAGEAL REFLUX DISEASE WITHOUT ESOPHAGITIS: ICD-10-CM

## 2023-08-02 PROBLEM — Z96.0 STATUS POST CYSTOSCOPY WITH URETERAL STENT PLACEMENT: Chronic | Status: RESOLVED | Noted: 2019-06-05 | Resolved: 2023-08-02

## 2023-08-02 PROBLEM — E66.3 OVERWEIGHT: Status: RESOLVED | Noted: 2017-10-11 | Resolved: 2023-08-02

## 2023-08-02 PROBLEM — J06.9 VIRAL URI WITH COUGH: Status: RESOLVED | Noted: 2019-05-27 | Resolved: 2023-08-02

## 2023-08-02 PROBLEM — E08.21 DIABETES MELLITUS DUE TO UNDERLYING CONDITION WITH DIABETIC NEPHROPATHY, WITHOUT LONG-TERM CURRENT USE OF INSULIN (HCC): Status: RESOLVED | Noted: 2018-07-17 | Resolved: 2023-08-02

## 2023-08-02 PROBLEM — J12.82 PNEUMONIA DUE TO COVID-19 VIRUS: Status: RESOLVED | Noted: 2020-05-06 | Resolved: 2023-08-02

## 2023-08-02 PROBLEM — U07.1 PNEUMONIA DUE TO COVID-19 VIRUS: Status: RESOLVED | Noted: 2020-05-06 | Resolved: 2023-08-02

## 2023-08-02 PROBLEM — R10.13 DYSPEPSIA: Status: RESOLVED | Noted: 2017-10-11 | Resolved: 2023-08-02

## 2023-08-02 PROBLEM — R42 DIZZINESS: Status: RESOLVED | Noted: 2018-07-16 | Resolved: 2023-08-02

## 2023-08-02 PROBLEM — H54.7 DECREASED VISION: Status: RESOLVED | Noted: 2017-10-11 | Resolved: 2023-08-02

## 2023-08-02 LAB
ALBUMIN SERPL BCP-MCNC: 3.9 G/DL (ref 3.5–5)
ALP SERPL-CCNC: 109 U/L (ref 46–116)
ALT SERPL W P-5'-P-CCNC: 61 U/L (ref 12–78)
ANION GAP SERPL CALCULATED.3IONS-SCNC: 6 MMOL/L
AST SERPL W P-5'-P-CCNC: 35 U/L (ref 5–45)
BASOPHILS # BLD AUTO: 0.03 THOUSANDS/ÂΜL (ref 0–0.1)
BASOPHILS NFR BLD AUTO: 0 % (ref 0–1)
BILIRUB SERPL-MCNC: 0.59 MG/DL (ref 0.2–1)
BUN SERPL-MCNC: 11 MG/DL (ref 5–25)
CALCIUM SERPL-MCNC: 9.2 MG/DL (ref 8.3–10.1)
CHLORIDE SERPL-SCNC: 108 MMOL/L (ref 96–108)
CHOLEST SERPL-MCNC: 171 MG/DL
CO2 SERPL-SCNC: 25 MMOL/L (ref 21–32)
CREAT SERPL-MCNC: 0.78 MG/DL (ref 0.6–1.3)
EOSINOPHIL # BLD AUTO: 0.05 THOUSAND/ÂΜL (ref 0–0.61)
EOSINOPHIL NFR BLD AUTO: 1 % (ref 0–6)
ERYTHROCYTE [DISTWIDTH] IN BLOOD BY AUTOMATED COUNT: 12.5 % (ref 11.6–15.1)
EST. AVERAGE GLUCOSE BLD GHB EST-MCNC: 197 MG/DL
FERRITIN SERPL-MCNC: 16 NG/ML (ref 24–336)
GFR SERPL CREATININE-BSD FRML MDRD: 109 ML/MIN/1.73SQ M
GLUCOSE P FAST SERPL-MCNC: 163 MG/DL (ref 65–99)
HBA1C MFR BLD: 8.5 %
HCT VFR BLD AUTO: 41 % (ref 36.5–49.3)
HDLC SERPL-MCNC: 47 MG/DL
HGB BLD-MCNC: 13.8 G/DL (ref 12–17)
IMM GRANULOCYTES # BLD AUTO: 0.01 THOUSAND/UL (ref 0–0.2)
IMM GRANULOCYTES NFR BLD AUTO: 0 % (ref 0–2)
IRON SATN MFR SERPL: 11 % (ref 20–50)
IRON SERPL-MCNC: 54 UG/DL (ref 65–175)
LDLC SERPL CALC-MCNC: 82 MG/DL (ref 0–100)
LYMPHOCYTES # BLD AUTO: 2.24 THOUSANDS/ÂΜL (ref 0.6–4.47)
LYMPHOCYTES NFR BLD AUTO: 29 % (ref 14–44)
MCH RBC QN AUTO: 28.2 PG (ref 26.8–34.3)
MCHC RBC AUTO-ENTMCNC: 33.7 G/DL (ref 31.4–37.4)
MCV RBC AUTO: 84 FL (ref 82–98)
MONOCYTES # BLD AUTO: 0.54 THOUSAND/ÂΜL (ref 0.17–1.22)
MONOCYTES NFR BLD AUTO: 7 % (ref 4–12)
NEUTROPHILS # BLD AUTO: 5 THOUSANDS/ÂΜL (ref 1.85–7.62)
NEUTS SEG NFR BLD AUTO: 63 % (ref 43–75)
NONHDLC SERPL-MCNC: 124 MG/DL
NRBC BLD AUTO-RTO: 0 /100 WBCS
PLATELET # BLD AUTO: 284 THOUSANDS/UL (ref 149–390)
PMV BLD AUTO: 12.7 FL (ref 8.9–12.7)
POTASSIUM SERPL-SCNC: 4.1 MMOL/L (ref 3.5–5.3)
PROT SERPL-MCNC: 7.9 G/DL (ref 6.4–8.4)
RBC # BLD AUTO: 4.89 MILLION/UL (ref 3.88–5.62)
SL AMB POCT HEMOGLOBIN AIC: 8.4 (ref ?–6.5)
SODIUM SERPL-SCNC: 139 MMOL/L (ref 135–147)
TIBC SERPL-MCNC: 494 UG/DL (ref 250–450)
TRIGL SERPL-MCNC: 208 MG/DL
WBC # BLD AUTO: 7.87 THOUSAND/UL (ref 4.31–10.16)

## 2023-08-02 PROCEDURE — 83550 IRON BINDING TEST: CPT

## 2023-08-02 PROCEDURE — 83540 ASSAY OF IRON: CPT

## 2023-08-02 PROCEDURE — 86708 HEPATITIS A ANTIBODY: CPT

## 2023-08-02 PROCEDURE — 82728 ASSAY OF FERRITIN: CPT

## 2023-08-02 PROCEDURE — 83036 HEMOGLOBIN GLYCOSYLATED A1C: CPT | Performed by: PHYSICIAN ASSISTANT

## 2023-08-02 PROCEDURE — 36415 COLL VENOUS BLD VENIPUNCTURE: CPT

## 2023-08-02 PROCEDURE — 99396 PREV VISIT EST AGE 40-64: CPT | Performed by: PHYSICIAN ASSISTANT

## 2023-08-02 PROCEDURE — 85025 COMPLETE CBC W/AUTO DIFF WBC: CPT

## 2023-08-02 PROCEDURE — 80061 LIPID PANEL: CPT

## 2023-08-02 PROCEDURE — 86706 HEP B SURFACE ANTIBODY: CPT

## 2023-08-02 PROCEDURE — 80053 COMPREHEN METABOLIC PANEL: CPT

## 2023-08-02 PROCEDURE — 83036 HEMOGLOBIN GLYCOSYLATED A1C: CPT

## 2023-08-02 PROCEDURE — 99214 OFFICE O/P EST MOD 30 MIN: CPT | Performed by: PHYSICIAN ASSISTANT

## 2023-08-02 RX ORDER — ATORVASTATIN CALCIUM 20 MG/1
20 TABLET, FILM COATED ORAL DAILY
Qty: 90 TABLET | Refills: 2 | Status: SHIPPED | OUTPATIENT
Start: 2023-08-02

## 2023-08-02 RX ORDER — LISINOPRIL 2.5 MG/1
2.5 TABLET ORAL DAILY
Qty: 90 TABLET | Refills: 2 | Status: SHIPPED | OUTPATIENT
Start: 2023-08-02

## 2023-08-02 RX ORDER — OMEPRAZOLE 20 MG/1
20 CAPSULE, DELAYED RELEASE ORAL DAILY
Qty: 90 CAPSULE | Refills: 0 | Status: SHIPPED | OUTPATIENT
Start: 2023-08-02

## 2023-08-02 NOTE — ASSESSMENT & PLAN NOTE
Lab Results   Component Value Date    CHOLESTEROL 138 09/16/2022    CHOLESTEROL 157 10/11/2017     Lab Results   Component Value Date    HDL 43 09/16/2022    HDL 40 10/11/2017     Lab Results   Component Value Date    TRIG 196 (H) 09/16/2022    TRIG 146 10/11/2017     Lab Results   Component Value Date    [address] [facility] 95 09/16/2022     Lab Results   Component Value Date    LDLCALC 56 09/16/2022     Continue atorvastatin 20 mg daily. Will recheck lipid panel today. Low fat diet.

## 2023-08-02 NOTE — ASSESSMENT & PLAN NOTE
Declines getting H. Pylori testing today. Start omeprazole 20  Mg dialy. Reviewed anti-GERD diet and hand out given. Will consider H.pylori testing and EGD if no improvement.

## 2023-08-02 NOTE — ASSESSMENT & PLAN NOTE
Due to recheck urine micro. Continue lisinopril 2.5 mg daily. Adding Jardiance will likely help as well.

## 2023-08-02 NOTE — ASSESSMENT & PLAN NOTE
Lab Results   Component Value Date    HGBA1C 8.4 (A) 08/02/2023     Slight improvement from 8.6%. Continue metformin 1000 mg BID. Discussed adding on Jardiance. He was agreeable. Start 10 mg daily and may increase to 25 mg daily next visit. Discussed possible side effects. Reviewed nutrition and exercise recommendations. Continue checking BS 1-2 times daily. He did not bring his recordings today, but states it was 171 this morning. Bring log to next visit. Foot exam normal. Up to date on eye exam per patient. Will obtain records from the [facility] for [facility]. Follow up in 3 months, sooner if needed.

## 2023-08-02 NOTE — PROGRESS NOTES
ADULT ANNUAL PHYSICAL  [address] [place] [address] [place]    NAME: [facility]  AGE: 39 y.o. SEX: male  : 1980     DATE: 2023     Assessment and Plan:     Problem List Items Addressed This Visit        Digestive    Gastroesophageal reflux disease without esophagitis     Declines getting H. Pylori testing today. Start omeprazole 20  Mg dialy. Reviewed anti-GERD diet and hand out given. Will consider H.pylori testing and EGD if no improvement. Relevant Medications    omeprazole (PriLOSEC) 20 mg delayed release capsule       Endocrine    Type 2 diabetes mellitus without complication, without long-term current use of insulin ([facility])       Lab Results   Component Value Date    HGBA1C 8.4 (A) 2023     Slight improvement from 8.6%. Continue metformin 1000 mg BID. Discussed adding on Jardiance. He was agreeable. Start 10 mg daily and may increase to 25 mg daily next visit. Discussed possible side effects. Reviewed nutrition and exercise recommendations. Continue checking BS 1-2 times daily. He did not bring his recordings today, but states it was 171 this morning. Bring log to next visit. Foot exam normal. Up to date on eye exam per patient. Will obtain records from the [facility]. Follow up in 3 months, sooner if needed. Relevant Medications    metFORMIN (GLUCOPHAGE) 1000 MG tablet    Empagliflozin (JARDIANCE) 10 MG TABS tablet    Other Relevant Orders    POCT hemoglobin A1c (Completed)    Comprehensive metabolic panel    Albumin / creatinine urine ratio       Other    Microalbuminuria     Due to recheck urine micro. Continue lisinopril 2.5 mg daily. Adding Jardiance will likely help as well.           Relevant Medications    lisinopril (ZESTRIL) 2.5 mg tablet    Other Relevant Orders    Albumin / creatinine urine ratio    Mixed hyperlipidemia     Lab Results   Component Value Date    CHOLESTEROL 138 2022 CHOLESTEROL 157 10/11/2017     Lab Results   Component Value Date    HDL 43 09/16/2022    HDL 40 10/11/2017     Lab Results   Component Value Date    TRIG 196 (H) 09/16/2022    TRIG 146 10/11/2017     Lab Results   Component Value Date    [address] 09/16/2022     Lab Results   Component Value Date    LDLCALC 56 09/16/2022     Continue atorvastatin 20 mg daily. Will recheck lipid panel today. Low fat diet. Relevant Medications    atorvastatin (LIPITOR) 20 mg tablet    Other Relevant Orders    CBC and differential    Lipid panel    Annual physical exam - Primary     Discussed general health recommendations and screening guidelines. Recommend routine dental and eye exams. Up to date on screening labs. Up to date on immunizations. Next physical one year. Other Visit Diagnoses     BMI 29.0-29.9,adult              Immunizations and preventive care screenings were discussed with patient today. Appropriate education was printed on patient's after visit summary. Discussed risks and benefits of prostate cancer screening. We discussed the controversial history of PSA screening for prostate cancer in the [facility] as well as the risk of over detection and over treatment of prostate cancer by way of PSA screening. The patient understands that PSA blood testing is an imperfect way to screen for prostate cancer and that elevated PSA levels in the blood may also be caused by infection, inflammation, prostatic trauma or manipulation, urological procedures, or by benign prostatic enlargement. The role of the digital rectal examination in prostate cancer screening was also discussed and I discussed with him that there is large interobserver variability in the findings of digital rectal examination. Counseling:  Alcohol/drug use: discussed moderation in alcohol intake, the recommendations for healthy alcohol use, and avoidance of illicit drug use.   Dental Health: discussed importance of regular tooth brushing, flossing, and dental visits. Injury prevention: discussed safety/seat belts, safety helmets, smoke detectors, carbon dioxide detectors, and smoking near bedding or upholstery. Sexual health: discussed sexually transmitted diseases, partner selection, use of condoms, avoidance of unintended pregnancy, and contraceptive alternatives. · Exercise: the importance of regular exercise/physical activity was discussed. Recommend exercise 3-5 times per week for at least 30 minutes. BMI Counseling: Body mass index is 29.45 kg/m². The BMI is above normal. Nutrition recommendations include decreasing portion sizes, encouraging healthy choices of fruits and vegetables, decreasing fast food intake, consuming healthier snacks, limiting drinks that contain sugar, moderation in carbohydrate intake, increasing intake of lean protein, reducing intake of saturated and trans fat and reducing intake of cholesterol. Exercise recommendations include moderate physical activity 150 minutes/week, exercising 3-5 times per week and strength training exercises. No pharmacotherapy was ordered. Rationale for BMI follow-up plan is due to patient being overweight or obese. Depression Screening and Follow-up Plan: Patient was screened for depression during today's encounter. They screened negative with a PHQ-2 score of 0. Return in about 3 months (around 11/2/2023) for Recheck DM . Chief Complaint:     Chief Complaint   Patient presents with   • Follow-up     diabetes      History of Present Illness:     Adult Annual Physical   Patient here for a comprehensive physical exam. The patient reports problems - acid reflux. States this has been an issue in the past, it has worsened over the past couple months. Admits to certain foods and coffee making it worse. It is a combination of reflux and heartburn. Denies nausea or vomiting. Denies abdominal pain. Denies constipation or diarrhea. Denies hematochezia and melena. Diet and Physical Activity  · Diet/Nutrition: well balanced diet, limited junk food and limited fruits/vegetables. · Exercise: no formal exercise. Depression Screening  PHQ-2/9 Depression Screening    Little interest or pleasure in doing things: 0 - not at all  Feeling down, depressed, or hopeless: 0 - not at all  PHQ-2 Score: 0  PHQ-2 Interpretation: Negative depression screen       General Health  · Sleep: sleeps well and gets 7-8 hours of sleep on average. · Hearing: normal - bilateral.  · Vision: no vision problems, goes for regular eye exams and most recent eye exam <1 year ago. · Dental: regular dental visits and brushes teeth twice daily.  Health  · Symptoms include: none     Review of Systems:     Review of Systems   Constitutional: Negative for appetite change, chills, diaphoresis, fatigue, fever and unexpected weight change. HENT: Negative for sore throat, tinnitus and trouble swallowing. Eyes: Negative for visual disturbance. Respiratory: Negative for cough, chest tightness, shortness of breath and wheezing. Cardiovascular: Negative for chest pain, palpitations and leg swelling. Gastrointestinal: Negative for abdominal pain, blood in stool, constipation, diarrhea, nausea and vomiting. Endocrine: Negative for cold intolerance, heat intolerance, polydipsia, polyphagia and polyuria. Skin: Negative for rash. Neurological: Negative for dizziness, weakness, light-headedness, numbness and headaches. Hematological: Negative for adenopathy.       Past Medical History:     Past Medical History:   Diagnosis Date   • Cardiomegaly    • Diabetes mellitus ([address])    • Pneumonia due to COVID-19 virus 5/6/2020   • Status post cystoscopy with ureteral stent placement 6/5/2019      Past Surgical History:     Past Surgical History:   Procedure Laterality Date   • EYE SURGERY      FOR DECREASED VISION, PTERYGIUM   • IN CYSTO/URETERO W/LITHOTRIPSY &INDWELL STENT INSRT Right 5/28/2019 Procedure: CYSTOSCOPY, URETEROSCOPY, STONE BASKET RETRIEVAL, AND INSERTION STENT URETERAL;  Surgeon: Ivett Villar MD;  Location: [facility];  Service: Urology      Family History:     Family History   Problem Relation Age of Onset   • Diabetes Mother    • Kidney disease Mother    • Other Mother         UTERINE CYST   • No Known Problems Father       Social History:     Social History     Socioeconomic History   • Marital status: Single     Spouse name: None   • Number of children: 2   • Years of education: None   • Highest education level: None   Occupational History   • Occupation:      Comment: in restaurant; currently working   Tobacco Use   • Smoking status: Former   • Smokeless tobacco: Former   • Tobacco comments:     pt "quit 5 years ago"   Vaping Use   • Vaping Use: Never used   Substance and Sexual Activity   • Alcohol use: Not Currently   • Drug use: Never   • Sexual activity: Not Currently     Comment: SEXUAL ABSTINENCE   Other Topics Concern   • None   Social History Narrative    Caffeine use: 1 cup a day    Does not exercise      Social Determinants of Health     Financial Resource Strain: Medium Risk (1/17/2023)    Overall Financial Resource Strain (CARDIA)    • Difficulty of Paying Living Expenses: Somewhat hard   Food Insecurity: Food Insecurity Present (1/17/2023)    Hunger Vital Sign    • Worried About Running Out of Food in the Last Year: Sometimes true    • Ran Out of Food in the Last Year: Sometimes true   Transportation Needs: Unmet Transportation Needs (1/17/2023)    PRAPARE - Transportation    • Lack of Transportation (Medical):  Yes    • Lack of Transportation (Non-Medical): Yes   Physical Activity: Not on file   Stress: Not on file   Social Connections: Not on file   Intimate Partner Violence: Not on file   Housing Stability: Not on file      Current Medications:     Current Outpatient Medications   Medication Sig Dispense Refill   • atorvastatin (LIPITOR) 20 mg tablet Take 1 tablet (20 mg total) by mouth daily 90 tablet 2   • Empagliflozin (JARDIANCE) 10 MG TABS tablet Take 1 tablet (10 mg total) by mouth daily 90 tablet 3   • lisinopril (ZESTRIL) 2.5 mg tablet Take 1 tablet (2.5 mg total) by mouth daily 90 tablet 2   • metFORMIN (GLUCOPHAGE) 1000 MG tablet Take 1 tablet (1,000 mg total) by mouth 2 (two) times a day with meals 180 tablet 3   • omeprazole (PriLOSEC) 20 mg delayed release capsule Take 1 capsule (20 mg total) by mouth daily 90 capsule 0     No current facility-administered medications for this visit. Allergies: Allergies   Allergen Reactions   • Fish Allergy - Food Allergy Anaphylaxis     Tilapia-facial swelling   • Other      Annotation - [id number]: tilapia fish - swelling of throat/eyes      Physical Exam:     /82 (BP Location: Left arm, Patient Position: Sitting, Cuff Size: Adult)   Pulse 59   Temp 98.1 °F (36.7 °C) (Temporal)   Ht 5' 5" (1.651 m)   Wt 80.3 kg (177 lb)   BMI 29.45 kg/m²     Physical Exam  Vitals and nursing note reviewed. Constitutional:       General: He is awake. He is not in acute distress. Appearance: Normal appearance. He is well-developed, well-groomed and overweight. He is not ill-appearing. HENT:      Head: Normocephalic and atraumatic. Right Ear: Tympanic membrane, ear canal and external ear normal.      Left Ear: Tympanic membrane, ear canal and external ear normal.      Nose: Nose normal.      Mouth/Throat:      Mouth: Mucous membranes are moist.      Pharynx: Oropharynx is clear. No oropharyngeal exudate or posterior oropharyngeal erythema. Eyes:      General: No scleral icterus. Extraocular Movements: Extraocular movements intact. Conjunctiva/sclera: Conjunctivae normal.      Pupils: Pupils are equal, round, and reactive to light. Cardiovascular:      Rate and Rhythm: Normal rate and regular rhythm. Pulses: Normal pulses.  no weak pulses          Dorsalis pedis pulses are 2+ on the right side and 2+ on the left side. Posterior tibial pulses are 2+ on the right side and 2+ on the left side. Heart sounds: Normal heart sounds. No murmur heard. Pulmonary:      Effort: Pulmonary effort is normal. No respiratory distress. Breath sounds: Normal breath sounds and air entry. No decreased air movement. No decreased breath sounds, wheezing, rhonchi or rales. Abdominal:      General: Abdomen is flat. Bowel sounds are normal. There is no distension. Palpations: Abdomen is soft. Tenderness: There is no abdominal tenderness. There is no right CVA tenderness, left CVA tenderness, guarding or rebound. Hernia: No hernia is present. Musculoskeletal:         General: Normal range of motion. Cervical back: Neck supple. Right lower leg: No edema. Left lower leg: No edema. Feet:      Right foot:      Skin integrity: No ulcer, skin breakdown, erythema, warmth, callus or dry skin. Left foot:      Skin integrity: No ulcer, skin breakdown, erythema, warmth, callus or dry skin. Lymphadenopathy:      Cervical: No cervical adenopathy. Skin:     General: Skin is warm. Coloration: Skin is not jaundiced. Findings: No rash. Neurological:      General: No focal deficit present. Mental Status: He is alert and oriented to person, place, and time. Mental status is at baseline. Motor: Motor function is intact. Coordination: Coordination is intact. Gait: Gait is intact. Psychiatric:         Attention and Perception: Attention normal.         Mood and Affect: Mood and affect normal.         Speech: Speech normal.         Behavior: Behavior normal. Behavior is cooperative. Cognition and Memory: Cognition normal.      Patient's shoes and socks removed. Right Foot/Ankle   Right Foot Inspection  Skin Exam: skin normal and skin intact.  No dry skin, no warmth, no callus, no erythema, no maceration, no abnormal color, no pre-ulcer, no ulcer and no callus. Toe Exam: ROM and strength within normal limits. Sensory   Proprioception: intact  Monofilament testing: intact    Vascular  Capillary refills: < 3 seconds  The right DP pulse is 2+. The right PT pulse is 2+. Left Foot/Ankle  Left Foot Inspection  Skin Exam: skin normal and skin intact. No dry skin, no warmth, no erythema, no maceration, normal color, no pre-ulcer, no ulcer and no callus. Toe Exam: ROM and strength within normal limits. Sensory   Proprioception: intact  Monofilament testing: intact    Vascular  Capillary refills: < 3 seconds  The left DP pulse is 2+. The left PT pulse is 2+.      Assign Risk Category  No deformity present  No loss of protective sensation  No weak pulses  Risk: [address], [address] [facility]

## 2023-08-02 NOTE — PATIENT INSTRUCTIONS
Wellness Visit for Adults   AMBULATORY CARE:   A wellness visit  is when you see your healthcare provider to get screened for health problems. Your healthcare provider will also give you advice on how to stay healthy. Write down your questions so you remember to ask them. Ask your healthcare provider how often you should have a wellness visit. What happens at a wellness visit:  Your healthcare provider will ask about your health, and your family history of health problems. This includes high blood pressure, heart disease, and cancer. He or she will ask if you have symptoms that concern you, if you smoke, and about your mood. You may also be asked about your intake of medicines, supplements, food, and alcohol. Any of the following may be done:  • Your weight  will be checked. Your height may also be checked so your body mass index (BMI) can be calculated. Your BMI shows if you are at a healthy weight. • Your blood pressure  and heart rate will be checked. Your temperature may also be checked. • Blood and urine tests  may be done. Blood tests may be done to check your cholesterol levels. Abnormal cholesterol levels increase your risk for heart disease and stroke. You may also need a blood or urine test to check for diabetes if you are at increased risk. Urine tests may be done to look for signs of an infection or kidney disease. • A physical exam  includes checking your heartbeat and lungs with a stethoscope. Your healthcare provider may also check your skin to look for sun damage. • Screening tests  may be recommended. A screening test is done to check for diseases that may not cause symptoms. The screening tests you may need depend on your age, gender, family history, and lifestyle habits. For example, colorectal screening may be recommended if you are 48years old or older. Screening tests you need if you are a woman:   • A Pap smear  is used to screen for cervical cancer.  Pap smears are usually done every 3 to 5 years depending on your age. You may need them more often if you have had abnormal Pap smear test results in the past. Ask your healthcare provider how often you should have a Pap smear. • A mammogram  is an x-ray of your breasts to screen for breast cancer. Experts recommend mammograms every 2 years starting at age 48 years. You may need a mammogram at age 52 years or younger if you have an increased risk for breast cancer. Talk to your healthcare provider about when you should start having mammograms and how often you need them. Vaccines you may need:   • Get an influenza vaccine  every year. The influenza vaccine protects you from the flu. Several types of viruses cause the flu. The viruses change over time, so new vaccines are made each year. • Get a tetanus-diphtheria (Td) booster vaccine  every 10 years. This vaccine protects you against tetanus and diphtheria. Tetanus is a severe infection that may cause painful muscle spasms and lockjaw. Diphtheria is a severe bacterial infection that causes a thick covering in the back of your mouth and throat. • Get a human papillomavirus (HPV) vaccine  if you are female and aged 23 to 32 or male 23 to 24 and never received it. This vaccine protects you from HPV infection. HPV is the most common infection spread by sexual contact. HPV may also cause vaginal, penile, and anal cancers. • Get a pneumococcal vaccine  if you are aged 72 years or older. The pneumococcal vaccine is an injection given to protect you from pneumococcal disease. Pneumococcal disease is an infection caused by pneumococcal bacteria. The infection may cause pneumonia, meningitis, or an ear infection. • Get a shingles vaccine  if you are 60 or older, even if you have had shingles before. The shingles vaccine is an injection to protect you from the varicella-zoster virus. This is the same virus that causes chickenpox.  Shingles is a painful rash that develops in people who had chickenpox or have been exposed to the virus. How to eat healthy:  My Plate is a model for planning healthy meals. It shows the types and amounts of foods that should go on your plate. Fruits and vegetables make up about half of your plate, and grains and protein make up the other half. A serving of dairy is included on the side of your plate. The amount of calories and serving sizes you need depends on your age, gender, weight, and height. Examples of healthy foods are listed below:  • Eat a variety of vegetables  such as dark green, red, and orange vegetables. You can also include canned vegetables low in sodium (salt) and frozen vegetables without added butter or sauces. • Eat a variety of fresh fruits , canned fruit in 100% juice, frozen fruit, and dried fruit. • Include whole grains. At least half of the grains you eat should be whole grains. Examples include whole-wheat bread, wheat pasta, brown rice, and whole-grain cereals such as oatmeal.    • Eat a variety of protein foods such as seafood (fish and shellfish), lean meat, and poultry without skin (turkey and chicken). Examples of lean meats include pork leg, shoulder, or tenderloin, and beef round, sirloin, tenderloin, and extra lean ground beef. Other protein foods include eggs and egg substitutes, beans, peas, soy products, nuts, and seeds. • Choose low-fat dairy products such as skim or 1% milk or low-fat yogurt, cheese, and cottage cheese. • Limit unhealthy fats  such as butter, hard margarine, and shortening. Exercise:  Exercise at least 30 minutes per day on most days of the week. Some examples of exercise include walking, biking, dancing, and swimming. You can also fit in more physical activity by taking the stairs instead of the elevator or parking farther away from stores. Include muscle strengthening activities 2 days each week. Regular exercise provides many health benefits.  It helps you manage your weight, and decreases your risk for type 2 diabetes, heart disease, stroke, and high blood pressure. Exercise can also help improve your mood. Ask your healthcare provider about the best exercise plan for you. General health and safety guidelines:   • Do not smoke. Nicotine and other chemicals in cigarettes and cigars can cause lung damage. Ask your healthcare provider for information if you currently smoke and need help to quit. E-cigarettes or smokeless tobacco still contain nicotine. Talk to your healthcare provider before you use these products. • Limit alcohol. A drink of alcohol is 12 ounces of beer, 5 ounces of wine, or 1½ ounces of liquor. • Lose weight, if needed. Being overweight increases your risk of certain health conditions. These include heart disease, high blood pressure, type 2 diabetes, and certain types of cancer. • Protect your skin. Do not sunbathe or use tanning beds. Use sunscreen with a SPF 15 or higher. Apply sunscreen at least 15 minutes before you go outside. Reapply sunscreen every 2 hours. Wear protective clothing, hats, and sunglasses when you are outside. • Drive safely. Always wear your seatbelt. Make sure everyone in your car wears a seatbelt. A seatbelt can save your life if you are in an accident. Do not use your cell phone when you are driving. This could distract you and cause an accident. Pull over if you need to make a call or send a text message. • Practice safe sex. Use latex condoms if are sexually active and have more than one partner. Your healthcare provider may recommend screening tests for sexually transmitted infections (STIs). • Wear helmets, lifejackets, and protective gear. Always wear a helmet when you ride a bike or motorcycle, go skiing, or play sports that could cause a head injury. Wear protective equipment when you play sports. Wear a lifejacket when you are on a boat or doing water sports.     © Copyright [employer] 2022 Information is for End User's use only and may not be sold, redistributed or otherwise used for commercial purposes. The above information is an  only. It is not intended as medical advice for individual conditions or treatments. Talk to your doctor, nurse or pharmacist before following any medical regimen to see if it is safe and effective for you. Weight Management   AMBULATORY CARE:   Why it is important to manage your weight:  Being overweight increases your risk of health conditions such as heart disease, high blood pressure, type 2 diabetes, and certain types of cancer. It can also increase your risk for osteoarthritis, sleep apnea, and other respiratory problems. Aim for a slow, steady weight loss. Even a small amount of weight loss can lower your risk of health problems. Risks of being overweight:  Extra weight can cause many health problems, including the following:  • Diabetes (high blood sugar level)    • High blood pressure or high cholesterol    • Heart disease    • Stroke    • Gallbladder or liver disease    • Cancer of the colon, breast, prostate, liver, or kidney    • Sleep apnea    • Arthritis or gout    Screening  is done to check for health conditions before you have signs or symptoms. If you are 28to 79years old, your blood sugar level may be checked every 3 years for signs of prediabetes or diabetes. Your healthcare provider will check your blood pressure at each visit. High blood pressure can lead to a stroke or other problems. Your provider may check for signs of heart disease, cancer, or other health problems. How to lose weight safely:  A safe and healthy way to lose weight is to eat fewer calories and get regular exercise. • You can lose up about 1 pound a week by decreasing the number of calories you eat by 500 calories each day. You can decrease calories by eating smaller portion sizes or by cutting out high-calorie foods.  Read labels to find out how many calories are in the foods you eat.         • You can also burn calories with exercise such as walking, swimming, or biking. You will be more likely to keep weight off if you make these changes part of your lifestyle. Exercise at least 30 minutes per day on most days of the week. You can also fit in more physical activity by taking the stairs instead of the elevator or parking farther away from stores. Ask your healthcare provider about the best exercise plan for you. Healthy meal plan for weight management:  A healthy meal plan includes a variety of foods, contains fewer calories, and helps you stay healthy. A healthy meal plan includes the following:     • Eat whole-grain foods more often. A healthy meal plan should contain fiber. Fiber is the part of grains, fruits, and vegetables that is not broken down by your body. Whole-grain foods are healthy and provide extra fiber in your diet. Some examples of whole-grain foods are whole-wheat breads and pastas, oatmeal, brown rice, and bulgur. • Eat a variety of vegetables every day. Include dark, leafy greens such as spinach, kale, tiago greens, and mustard greens. Eat yellow and orange vegetables such as carrots, sweet potatoes, and winter squash. • Eat a variety of fruits every day. Choose fresh or canned fruit (canned in its own juice or light syrup) instead of juice. Fruit juice has very little or no fiber. • Eat low-fat dairy foods. Drink fat-free (skim) milk or 1% milk. Eat fat-free yogurt and low-fat cottage cheese. Try low-fat cheeses such as mozzarella and other reduced-fat cheeses. • Choose meat and other protein foods that are low in fat. Choose beans or other legumes such as split peas or lentils. Choose fish, skinless poultry (chicken or turkey), or lean cuts of red meat (beef or pork). Before you cook meat or poultry, cut off any visible fat. • Use less fat and oil. Try baking foods instead of frying them.  Add less fat, such as margarine, sour cream, regular salad dressing and mayonnaise to foods. Eat fewer high-fat foods. Some examples of high-fat foods include french fries, doughnuts, ice cream, and cakes. • Eat fewer sweets. Limit foods and drinks that are high in sugar. This includes candy, cookies, regular soda, and sweetened drinks. Ways to decrease calories:   • Eat smaller portions. ? Use a small plate with smaller servings. ? Do not eat second helpings. ? When you eat at a restaurant, ask for a box and place half of your meal in the box before you eat. ? Share an entrée with someone else. • Replace high-calorie snacks with healthy, low-calorie snacks. ? Choose fresh fruit, vegetables, fat-free rice cakes, or air-popped popcorn instead of potato chips, nuts, or chocolate. ? Choose water or calorie-free drinks instead of soda or sweetened drinks. • Do not shop for groceries when you are hungry. You may be more likely to make unhealthy food choices. Take a grocery list of healthy foods and shop after you have eaten. • Eat regular meals. Do not skip meals. Skipping meals can lead to overeating later in the day. This can make it harder for you to lose weight. Eat a healthy snack in place of a meal if you do not have time to eat a regular meal. Talk with a dietitian to help you create a meal plan and schedule that is right for you. Other things to consider as you try to lose weight:   • Be aware of situations that may give you the urge to overeat, such as eating while watching television. Find ways to avoid these situations. For example, read a book, go for a walk, or do crafts. • Meet with a weight loss support group or friends who are also trying to lose weight. This may help you stay motivated to continue working on your weight loss goals. © Copyright [employer] 2022 Information is for End User's use only and may not be sold, redistributed or otherwise used for commercial purposes.   The above information is an  only. It is not intended as medical advice for individual conditions or treatments. Talk to your doctor, nurse or pharmacist before following any medical regimen to see if it is safe and effective for you.

## 2023-08-03 LAB
HAV AB SER QL IA: REACTIVE
HBV SURFACE AB SER-ACNC: <3 MIU/ML

## 2023-08-07 DIAGNOSIS — E61.1 LOW SERUM IRON: Primary | ICD-10-CM

## 2023-08-07 RX ORDER — FERROUS SULFATE TAB EC 324 MG (65 MG FE EQUIVALENT) 324 (65 FE) MG
324 TABLET DELAYED RESPONSE ORAL EVERY OTHER DAY
Qty: 45 TABLET | Refills: 2 | Status: SHIPPED | OUTPATIENT
Start: 2023-08-07

## 2023-08-10 ENCOUNTER — TELEPHONE (OUTPATIENT)
Dept: GASTROENTEROLOGY | Facility: CLINIC | Age: 43
End: 2023-08-10

## 2023-08-10 NOTE — TELEPHONE ENCOUNTER
Called pt, lvm to call to schedule F/U ov with Dr. Sola Banerjee or LAYNE Kwon at [facility]. Dx: new iron deficiency.

## 2023-08-11 ENCOUNTER — PREP FOR PROCEDURE (OUTPATIENT)
Dept: GASTROENTEROLOGY | Facility: CLINIC | Age: 43
End: 2023-08-11

## 2023-08-11 DIAGNOSIS — E61.1 IRON DEFICIENCY: Primary | ICD-10-CM

## 2023-08-16 ENCOUNTER — OFFICE VISIT (OUTPATIENT)
Dept: GASTROENTEROLOGY | Facility: CLINIC | Age: 43
End: 2023-08-16
Payer: COMMERCIAL

## 2023-08-16 VITALS
BODY MASS INDEX: 29.39 KG/M2 | SYSTOLIC BLOOD PRESSURE: 120 MMHG | DIASTOLIC BLOOD PRESSURE: 86 MMHG | TEMPERATURE: 96.5 F | HEIGHT: 65 IN | WEIGHT: 176.4 LBS

## 2023-08-16 DIAGNOSIS — E61.1 IRON DEFICIENCY: Primary | ICD-10-CM

## 2023-08-16 DIAGNOSIS — K64.9 HEMORRHOIDS, UNSPECIFIED HEMORRHOID TYPE: ICD-10-CM

## 2023-08-16 DIAGNOSIS — K76.0 NAFLD (NONALCOHOLIC FATTY LIVER DISEASE): ICD-10-CM

## 2023-08-16 PROCEDURE — 99213 OFFICE O/P EST LOW 20 MIN: CPT | Performed by: FAMILY MEDICINE

## 2023-08-16 NOTE — PATIENT INSTRUCTIONS
Scheduled date of EGD/colonoscopy (as of today): 8/24/23  Physician performing EGD/colonoscopy: Zaid Frederick  Location of EGD/colonoscopy: [facility]  Desired bowel prep reviewed with patient: Golytely  Instructions reviewed with patient by: Emma Cottrell  Clearances: N/A

## 2023-08-16 NOTE — PROGRESS NOTES
[facility] - Outpatient Follow-up Note  Gertrude Leyva 39 y.o. male MRN: [id number]  Encounter: [id number]          ASSESSMENT AND PLAN:      1. NAFLD (nonalcoholic fatty liver disease)  Patient previously with mild/moderately elevated liver enzymes secondary to NAFLD. Patient has intentionally lost approximately 6 pounds since his last appointment and repeat CMP with now normal liver enzymes. U/S elastography with F0-F1 fibrosis. He is aware that he is immune to hepatitis A but not hepatitis B and of recommendations to complete the vaccination series for hepatitis B either through his PCP or local pharmacy. Patient is aware of the pathophysiology of fatty liver disease and potential to progress to cirrhosis if left untreated. He will continue with efforts towards steady and sustainable weight loss in addition to strict control of his contributing comorbidities and limiting his alcohol consumption. Recommended he continue to have his hepatic function monitored q6-12 months with routine labs and repeating an U/S elastography q 2-3years, or as clinically indicated, to monitor for the development of fibrosis. 2. Iron deficiency  Patient was found to be iron deficient although not anemic. No prior endoscopic evaluation, family history of colon cancer or alarm symptoms. Patient's PCP has prescribed him an iron supplement which he has had to  from the pharmacy. Discussed recommendations for the evaluation of iron deficiency including an EGD and colonoscopy to assess for potential source of GI blood loss or other luminal abnormality. Also encourage patient to  his Rx for ferrous sulfate and begin taking this. Discussed procedure and associated risks including, but not limited to, bleeding, infection, and/or perforation. Patient gave verbal understanding and is agreeable to proceed.  Patient received instructions for EGD/COY and bowel prep as prescribed - Golytely/Dulcolax. - polyethylene glycol (GOLYTELY) 4000 mL solution; Take 4,000 mL by mouth once for 1 dose  Dispense: 4000 mL; Refill: 0    3. Hemorrhoids, unspecified hemorrhoid type  Patient describes rare BRBPR described as blood on the toilet paper after wiping and a "swelling in his rectum" most c/w external hemorrhoids. Patient is scheduled for a colonoscopy for further evaluation of iron deficiency at which time can evaluate for alternative etiologies. Recommended some over-the-counter remedies for symptomatic external hemorrhoids including sitz bath's, Tucks pads and Preparation H. Follow-up approximately 2 weeks after procedures to discuss results. ______________________________________________________________________    SUBJECTIVE: Patient is a 39 y.o. male with PMH significant for DM2, HLD, and GERD who presents today for follow-up regarding NAFLD and iron deficiency anemia. Of note, a video  was utilized for today's visit. Interval history  Patient recently completed his labs notable for now normal liver enzymes and with immunity to hepatitis A but not hepatitis B. Patient was also noted to be iron deficient (ferritin 16) although not anemic. HFE testing was not drawn. Denies any previous endoscopic evaluation. Denies a family history of colon cancer. Patient has a longstanding history of reflux for which she takes omeprazole 20 mg once daily with good effect. Denies dysphagia/odynophagia, early satiety, nausea/vomiting, abdominal pain, constipation, diarrhea, rectal bleeding/melena or unintentional weight loss. Patient has lost approximately 6 pounds since his last appointment although states he has been trying. He does describe rare BRBPR, described as blood on the toilet paper after wiping, and a "swelling in his rectum" that goes away after a few days.      Extended liver history as per Dr. Tabatha Seals  He was noted to abnormal liver tests dating to 2017 with peak ALT 130s. Serologic workup negative for HBV and HCV infection and he had a RUQ U/S which showed hepatic steatosis.     He denies excessive EtOH and denies any recent changes in medications. Regarding his weight, his peak weight is 180 lbs which has been stable over the years. He was born in [place] and emigrated to the [address] [place] [address] fifteen years ago. 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 He denies a family history of liver disease or liver cancer. REVIEW OF SYSTEMS IS OTHERWISE NEGATIVE.       Historical Information   Past Medical History:   Diagnosis Date   • Cardiomegaly    • Diabetes mellitus ([address])    • Pneumonia due to COVID-19 virus 5/6/2020   • Status post cystoscopy with ureteral stent placement 6/5/2019     Past Surgical History:   Procedure Laterality Date   • EYE SURGERY      FOR DECREASED VISION, PTERYGIUM   • OH CYSTO/URETERO W/LITHOTRIPSY &INDWELL STENT INSRT Right 5/28/2019    Procedure: CYSTOSCOPY, URETEROSCOPY, STONE BASKET RETRIEVAL, AND INSERTION STENT URETERAL;  Surgeon: Dmitri Diaz MD;  Location: [facility];  Service: Urology     Social History   Social History     Substance and Sexual Activity   Alcohol Use Not Currently     Social History     Substance and Sexual Activity   Drug Use Never     Social History     Tobacco Use   Smoking Status Former   Smokeless Tobacco Former   Tobacco Comments    pt "quit 5 years ago"     Family History   Problem Relation Age of Onset   • Diabetes Mother    • Kidney disease Mother    • Other Mother         UTERINE CYST   • No Known Problems Father        Meds/Allergies       Current Outpatient Medications:   •  atorvastatin (LIPITOR) 20 mg tablet  •  Empagliflozin (JARDIANCE) 10 MG TABS tablet  •  lisinopril (ZESTRIL) 2.5 mg tablet  •  metFORMIN (GLUCOPHAGE) 1000 MG tablet  •  omeprazole (PriLOSEC) 20 mg delayed release capsule  •  polyethylene glycol (GOLYTELY) 4000 mL solution  •  ferrous sulfate 324 (65 Fe) mg    Allergies   Allergen Reactions   • Fish Allergy - Food Allergy Anaphylaxis     Tilapia-facial swelling   • Other      Annotation - [id number]: tilapia fish - swelling of throat/eyes           Objective     Blood pressure 120/86, temperature (!) 96.5 °F (35.8 °C), temperature source Tympanic, height 5' 5" (1.651 m), weight 80 kg (176 lb 6.4 oz). Body mass index is 29.35 kg/m². PHYSICAL EXAM:      General Appearance:   Alert, cooperative, no distress   HEENT:   Normocephalic, atraumatic, anicteric. Neck:  Supple, symmetrical, trachea midline   Lungs:   Clear to auscultation bilaterally; no rales, rhonchi or wheezing; respirations unlabored    Heart[de-identified]   Regular rate and rhythm; no murmur, rub, or gallop. Abdomen:   Soft, non-tender, non-distended; normal bowel sounds; no masses, no organomegaly    Genitalia:   Deferred    Rectal:   Deferred    Extremities:  No cyanosis, clubbing or edema    Pulses:  2+ and symmetric    Skin:  No jaundice, rashes, or lesions    Lymph nodes:  No palpable cervical lymphadenopathy        Lab Results:   No visits with results within 1 Day(s) from this visit.    Latest known visit with results is:   Appointment on 08/02/2023   Component Date Value   • Hemoglobin A1C 08/02/2023 8.5 (H)    • EAG 08/02/2023 197    • WBC 08/02/2023 7.87    • RBC 08/02/2023 4.89    • Hemoglobin 08/02/2023 13.8    • Hematocrit 08/02/2023 41.0    • MCV 08/02/2023 84    • MCH 08/02/2023 28.2    • MCHC 08/02/2023 33.7    • RDW 08/02/2023 12.5    • MPV 08/02/2023 12.7    • Platelets 60/86/9813 284    • nRBC 08/02/2023 0    • Neutrophils Relative 08/02/2023 63    • Immat GRANS % 08/02/2023 0    • Lymphocytes Relative 08/02/2023 29    • Monocytes Relative 08/02/2023 7    • Eosinophils Relative 08/02/2023 1    • Basophils Relative 08/02/2023 0    • Neutrophils Absolute 08/02/2023 5.00    • Immature Grans Absolute 08/02/2023 0.01    • Lymphocytes Absolute 08/02/2023 2.24    • Monocytes Absolute 08/02/2023 0.54    • Eosinophils Absolute 08/02/2023 0.05    • Basophils Absolute 08/02/2023 0.03    • Sodium 08/02/2023 139    • Potassium 08/02/2023 4.1    • Chloride 08/02/2023 108    • CO2 08/02/2023 25    • ANION GAP 08/02/2023 6    • BUN 08/02/2023 11    • Creatinine 08/02/2023 0.78    • Glucose, Fasting 08/02/2023 163 (H)    • Calcium 08/02/2023 9.2    • AST 08/02/2023 35    • ALT 08/02/2023 61    • Alkaline Phosphatase 08/02/2023 109    • Total Protein 08/02/2023 7.9    • Albumin 08/02/2023 3.9    • Total Bilirubin 08/02/2023 0.59    • eGFR 08/02/2023 109    • Cholesterol 08/02/2023 171    • Triglycerides 08/02/2023 208 (H)    • HDL, Direct 08/02/2023 47    • LDL Calculated 08/02/2023 82    • Non-HDL-Chol (CHOL-HDL) 08/02/2023 124    • Hep A Total Ab 08/02/2023 Reactive (A)    • Hep B S Ab 08/02/2023 <3.00    • Iron Saturation 08/02/2023 11 (L)    • TIBC 08/02/2023 494 (H)    • Iron 08/02/2023 54 (L)    • Ferritin 08/02/2023 16 (L)          Radiology Results:   No results found. Thelma Ly PA-C     **Please note: Dictation voice to text software may have been used in the creation of this record. Occasional wrong word or “sound alike” substitutions may have occurred due to the inherent limitations of voice recognition software. Read the chart carefully and recognize, using context, where substitutions have occurred. **

## 2023-08-23 RX ORDER — SODIUM CHLORIDE, SODIUM LACTATE, POTASSIUM CHLORIDE, CALCIUM CHLORIDE 600; 310; 30; 20 MG/100ML; MG/100ML; MG/100ML; MG/100ML
75 INJECTION, SOLUTION INTRAVENOUS CONTINUOUS
Status: CANCELLED | OUTPATIENT
Start: 2023-08-23

## 2023-08-24 ENCOUNTER — HOSPITAL ENCOUNTER (OUTPATIENT)
Dept: GASTROENTEROLOGY | Facility: HOSPITAL | Age: 43
Setting detail: OUTPATIENT SURGERY
End: 2023-08-24
Attending: STUDENT IN AN ORGANIZED HEALTH CARE EDUCATION/TRAINING PROGRAM
Payer: COMMERCIAL

## 2023-08-24 ENCOUNTER — ANESTHESIA (OUTPATIENT)
Dept: GASTROENTEROLOGY | Facility: HOSPITAL | Age: 43
End: 2023-08-24

## 2023-08-24 ENCOUNTER — ANESTHESIA EVENT (OUTPATIENT)
Dept: GASTROENTEROLOGY | Facility: HOSPITAL | Age: 43
End: 2023-08-24

## 2023-08-24 VITALS
WEIGHT: 170 LBS | HEART RATE: 59 BPM | RESPIRATION RATE: 16 BRPM | HEIGHT: 61 IN | BODY MASS INDEX: 32.1 KG/M2 | OXYGEN SATURATION: 99 % | DIASTOLIC BLOOD PRESSURE: 78 MMHG | TEMPERATURE: 98 F | SYSTOLIC BLOOD PRESSURE: 127 MMHG

## 2023-08-24 DIAGNOSIS — E61.1 IRON DEFICIENCY: ICD-10-CM

## 2023-08-24 PROBLEM — D64.9 ANEMIA: Status: ACTIVE | Noted: 2023-08-24

## 2023-08-24 PROBLEM — I10 HTN (HYPERTENSION): Status: ACTIVE | Noted: 2023-08-24

## 2023-08-24 LAB
GLUCOSE SERPL-MCNC: 136 MG/DL (ref 65–140)
GLUCOSE SERPL-MCNC: 148 MG/DL (ref 65–140)

## 2023-08-24 PROCEDURE — 45378 DIAGNOSTIC COLONOSCOPY: CPT | Performed by: STUDENT IN AN ORGANIZED HEALTH CARE EDUCATION/TRAINING PROGRAM

## 2023-08-24 PROCEDURE — 43239 EGD BIOPSY SINGLE/MULTIPLE: CPT | Performed by: STUDENT IN AN ORGANIZED HEALTH CARE EDUCATION/TRAINING PROGRAM

## 2023-08-24 PROCEDURE — 82948 REAGENT STRIP/BLOOD GLUCOSE: CPT

## 2023-08-24 PROCEDURE — 88305 TISSUE EXAM BY PATHOLOGIST: CPT | Performed by: STUDENT IN AN ORGANIZED HEALTH CARE EDUCATION/TRAINING PROGRAM

## 2023-08-24 RX ORDER — PROPOFOL 10 MG/ML
INJECTION, EMULSION INTRAVENOUS CONTINUOUS PRN
Status: DISCONTINUED | OUTPATIENT
Start: 2023-08-24 | End: 2023-08-24

## 2023-08-24 RX ORDER — PROPOFOL 10 MG/ML
INJECTION, EMULSION INTRAVENOUS AS NEEDED
Status: DISCONTINUED | OUTPATIENT
Start: 2023-08-24 | End: 2023-08-24

## 2023-08-24 RX ORDER — SODIUM CHLORIDE, SODIUM LACTATE, POTASSIUM CHLORIDE, CALCIUM CHLORIDE 600; 310; 30; 20 MG/100ML; MG/100ML; MG/100ML; MG/100ML
75 INJECTION, SOLUTION INTRAVENOUS CONTINUOUS
Status: DISCONTINUED | OUTPATIENT
Start: 2023-08-24 | End: 2023-08-24

## 2023-08-24 RX ORDER — LIDOCAINE HYDROCHLORIDE 20 MG/ML
INJECTION, SOLUTION EPIDURAL; INFILTRATION; INTRACAUDAL; PERINEURAL AS NEEDED
Status: DISCONTINUED | OUTPATIENT
Start: 2023-08-24 | End: 2023-08-24

## 2023-08-24 RX ORDER — SIMETHICONE 20 MG/.3ML
EMULSION ORAL AS NEEDED
Status: COMPLETED | OUTPATIENT
Start: 2023-08-24 | End: 2023-08-24

## 2023-08-24 RX ADMIN — PROPOFOL 100 MG: 10 INJECTION, EMULSION INTRAVENOUS at 08:33

## 2023-08-24 RX ADMIN — PROPOFOL 50 MG: 10 INJECTION, EMULSION INTRAVENOUS at 08:43

## 2023-08-24 RX ADMIN — PROPOFOL 130 MCG/KG/MIN: 10 INJECTION, EMULSION INTRAVENOUS at 08:49

## 2023-08-24 RX ADMIN — PROPOFOL 50 MG: 10 INJECTION, EMULSION INTRAVENOUS at 08:36

## 2023-08-24 RX ADMIN — PROPOFOL 50 MG: 10 INJECTION, EMULSION INTRAVENOUS at 08:38

## 2023-08-24 RX ADMIN — SODIUM CHLORIDE, SODIUM LACTATE, POTASSIUM CHLORIDE, AND CALCIUM CHLORIDE 75 ML/HR: .6; .31; .03; .02 INJECTION, SOLUTION INTRAVENOUS at 08:20

## 2023-08-24 RX ADMIN — Medication 40 MG: at 08:52

## 2023-08-24 RX ADMIN — LIDOCAINE HYDROCHLORIDE 80 MG: 20 INJECTION, SOLUTION EPIDURAL; INFILTRATION; INTRACAUDAL; PERINEURAL at 08:33

## 2023-08-24 RX ADMIN — PROPOFOL 50 MG: 10 INJECTION, EMULSION INTRAVENOUS at 08:51

## 2023-08-24 NOTE — ANESTHESIA POSTPROCEDURE EVALUATION
Post-Op Assessment Note    CV Status:  Stable    Pain management: adequate     Mental Status:  Alert and awake   Hydration Status:  Euvolemic   PONV Controlled:  Controlled   Airway Patency:  Patent      Post Op Vitals Reviewed: Yes      Staff: CRNA         No notable events documented.     /71 (08/24/23 0920)    Temp 97.5 °F (36.4 °C) (08/24/23 0920)    Pulse 75 (08/24/23 0920)   Resp 16 (08/24/23 0920)    SpO2 98 % (08/24/23 0920)

## 2023-08-24 NOTE — H&P
History and Physical -  [facility]  Dorie Claude 39 y.o. male MRN: [id number]        HPI: Dorie Claude is a 39y.o. year old male who presents for evaluation of iron deficiency anemia. REVIEW OF SYSTEMS: Per the HPI, and otherwise unremarkable.     Historical Information   Past Medical History:   Diagnosis Date   • Cardiomegaly    • Diabetes mellitus ([address])    • Pneumonia due to COVID-19 virus 5/6/2020   • Status post cystoscopy with ureteral stent placement 6/5/2019     Past Surgical History:   Procedure Laterality Date   • EYE SURGERY      FOR DECREASED VISION, PTERYGIUM   • OH CYSTO/URETERO W/LITHOTRIPSY &INDWELL STENT INSRT Right 5/28/2019    Procedure: CYSTOSCOPY, URETEROSCOPY, STONE BASKET RETRIEVAL, AND INSERTION STENT URETERAL;  Surgeon: Ming Barakat MD;  Location: [facility];  Service: Urology     Social History   Social History     Substance and Sexual Activity   Alcohol Use Not Currently     Social History     Substance and Sexual Activity   Drug Use Never     Social History     Tobacco Use   Smoking Status Former   Smokeless Tobacco Former   Tobacco Comments    pt "quit 5 years ago"     Family History   Problem Relation Age of Onset   • Diabetes Mother    • Kidney disease Mother    • Other Mother         UTERINE CYST   • No Known Problems Father        Meds/Allergies       Current Outpatient Medications:   •  atorvastatin (LIPITOR) 20 mg tablet  •  Empagliflozin (JARDIANCE) 10 MG TABS tablet  •  lisinopril (ZESTRIL) 2.5 mg tablet  •  metFORMIN (GLUCOPHAGE) 1000 MG tablet  •  ferrous sulfate 324 (65 Fe) mg  •  omeprazole (PriLOSEC) 20 mg delayed release capsule  •  polyethylene glycol (GOLYTELY) 4000 mL solution    Current Facility-Administered Medications:   •  lactated ringers infusion, 75 mL/hr, Intravenous, Continuous    Allergies   Allergen Reactions   • Fish Allergy - Food Allergy Anaphylaxis     Tilapia-facial swelling   • Other      Annotation - [id number]: tilapia fish - swelling of throat/eyes       Objective     /82   Pulse 58   Temp (!) 96.9 °F (36.1 °C) (Temporal)   Resp 18   Ht 5' 1.02" (1.55 m)   Wt 77.1 kg (170 lb)   SpO2 99%   BMI 32.10 kg/m²       PHYSICAL EXAM    Gen: NAD  Head: NCAT  CV: RRR  CHEST: Clear  ABD: soft, NT/ND  EXT: no edema      ASSESSMENT/PLAN:  This is a 39y.o. year old male here for EGD/Colonoscopy, and he is stable and optimized for his procedure.

## 2023-08-24 NOTE — ANESTHESIA PREPROCEDURE EVALUATION
Procedure:  EGD  COLONOSCOPY    Relevant Problems   CARDIO   (+) HTN (hypertension)   (+) Mixed hyperlipidemia      ENDO   (+) Type 2 diabetes mellitus without complication, without long-term current use of insulin (HCC)      GI/HEPATIC   (+) Gastroesophageal reflux disease without esophagitis      HEMATOLOGY   (+) Anemia      MUSCULOSKELETAL   (+) Acute bilateral low back pain without sciatica        Physical Exam    Airway    Mallampati score: II  TM Distance: >3 FB  Neck ROM: full     Dental       Cardiovascular  Rhythm: regular, Rate: normal,     Pulmonary  Breath sounds clear to auscultation,     Other Findings        Anesthesia Plan  ASA Score- 2     Anesthesia Type- IV sedation with anesthesia with ASA Monitors. Additional Monitors:   Airway Plan:           Plan Factors-    Chart reviewed. Patient is not a current smoker. Induction- intravenous. Postoperative Plan-     Informed Consent- Anesthetic plan and risks discussed with patient. I personally reviewed this patient with the CRNA. Discussed and agreed on the Anesthesia Plan with the CRNA. Albertina Iraheta

## 2023-08-26 NOTE — ASSESSMENT & PLAN NOTE
Discussed general health recommendations and screening guidelines. Recommend routine dental and eye exams. Up to date on screening labs. Up to date on immunizations. Next physical one year. Report given to [facility], [address] [place] [address]. SBAR discussed. All questions answered. RN verbalized understanding.      Joy Randolph RN  08/26/23 0628

## 2023-08-28 PROCEDURE — 88305 TISSUE EXAM BY PATHOLOGIST: CPT | Performed by: STUDENT IN AN ORGANIZED HEALTH CARE EDUCATION/TRAINING PROGRAM

## 2023-08-30 DIAGNOSIS — A04.8 H. PYLORI INFECTION: ICD-10-CM

## 2023-08-30 DIAGNOSIS — A04.8 H. PYLORI INFECTION: Primary | ICD-10-CM

## 2023-08-30 RX ORDER — OMEPRAZOLE 40 MG/1
40 CAPSULE, DELAYED RELEASE ORAL 2 TIMES DAILY
Qty: 28 CAPSULE | Refills: 0 | Status: SHIPPED | OUTPATIENT
Start: 2023-08-30 | End: 2023-09-13

## 2023-08-30 RX ORDER — TETRACYCLINE HYDROCHLORIDE 500 MG/1
500 CAPSULE ORAL 4 TIMES DAILY
Qty: 56 CAPSULE | Refills: 0 | Status: SHIPPED | OUTPATIENT
Start: 2023-08-30 | End: 2023-09-13

## 2023-08-30 RX ORDER — METRONIDAZOLE 250 MG/1
250 TABLET ORAL 4 TIMES DAILY
Qty: 56 TABLET | Refills: 0 | Status: SHIPPED | OUTPATIENT
Start: 2023-08-30 | End: 2023-09-13

## 2023-08-30 RX ORDER — BISMUTH SUBSALICYLATE 262 MG/1
524 TABLET, CHEWABLE ORAL
Qty: 30 TABLET | Refills: 0 | Status: SHIPPED | OUTPATIENT
Start: 2023-08-30

## 2023-08-30 RX ORDER — OMEPRAZOLE 40 MG/1
CAPSULE, DELAYED RELEASE ORAL
Qty: 1 CAPSULE | Refills: 0 | OUTPATIENT
Start: 2023-08-30

## 2023-09-19 ENCOUNTER — OFFICE VISIT (OUTPATIENT)
Dept: GASTROENTEROLOGY | Facility: CLINIC | Age: 43
End: 2023-09-19

## 2023-09-19 VITALS
WEIGHT: 180.8 LBS | HEIGHT: 61 IN | DIASTOLIC BLOOD PRESSURE: 88 MMHG | BODY MASS INDEX: 34.14 KG/M2 | TEMPERATURE: 97.6 F | SYSTOLIC BLOOD PRESSURE: 132 MMHG

## 2023-09-19 DIAGNOSIS — K76.0 NAFLD (NONALCOHOLIC FATTY LIVER DISEASE): Primary | ICD-10-CM

## 2023-09-19 DIAGNOSIS — A04.8 H. PYLORI INFECTION: ICD-10-CM

## 2023-09-19 DIAGNOSIS — R79.89 ABNORMAL LIVER FUNCTION TESTS: ICD-10-CM

## 2023-09-19 DIAGNOSIS — E10.69 TYPE 1 DIABETES MELLITUS WITH OTHER SPECIFIED COMPLICATION (HCC): ICD-10-CM

## 2023-09-19 PROCEDURE — 99214 OFFICE O/P EST MOD 30 MIN: CPT | Performed by: STUDENT IN AN ORGANIZED HEALTH CARE EDUCATION/TRAINING PROGRAM

## 2023-09-19 NOTE — PROGRESS NOTES
[facility] - Outpatient Consultation  Ricardo Hoffmann 39 y.o. male MRN: [id number]  Encounter: [id number]    PCP:  Jcarlos Maciel, [phone]  Referring Provider:  No ref. provider found,     Patient: Ricardo Hoffmann, 2/6/1897  Reason for Referral: NAFLD    ASSESSMENT/PLAN:  39 y.o. male with  history of T2DM, HLD, and GERD who presents today for follow-up regarding NAFLD and iron deficiency anemia. He established care in hepatology initially for abnormal liver tests with hepatic steatosis. His serologic work-up was unrevealing and etiology was felt to be due to NAFLD given his metabolic risk factors. He had an ultrasound elastography which showed F0-F1 disease. Course has been complicated by iron-deficiency anemia for which EGD/COY was performed and was notable for H. Pylori gastritis. He was initially planned for quadruple therapy but was noted to be only taking Flagyl and PPI today. May need to consider switching over to triple therapy if he does not have adequate insurance coverage. Otherwise, we discussed that maintaining a lean body weight and aggressive modification of his metabolic risk factors are [place] in prevent progression of disease. Ideally, would recommend a GLP1a given history of uncontrolled DM as this has been shown to improve steatohepatitis and cause regression of fibrosis in patients with SEWELL. Will communicate these recommendations to his endocrinologist. He should also avoid interval weight gain and excessive EtOH consumption. He should return to clinic in 1 year or sooner if needed. Thank you for the opportunity to consult in his care.     - US elastography in 1-2 years  - Will [facility] pharmacy to determine if he needs to be switched to triple therapy  - Recommend Ozempic or Wegovy for DM management   - Needs HBV vaccination with PCP     Federico Kingston MD  Division of Gastroenterology and Hepatology  [facility]    ============================================================================  CC/HPI: 39 y.o. male with history of T2DM, HLD, and GERD who presents today for follow-up regarding NAFLD and iron deficiency anemia. Interval history  - US elastography showed F0-F1 disease  - EGD/COY showed gastritis with gastric biopsies showing H. Pylori. Started on quadruple therapy but reviewed medications and he is only taking Flagyl and his PPI   - Started on victoza and insulin for uncontrolled DM but unclear if he is taking these medications     Extended liver history  He was noted to abnormal liver tests dating to 2017 with peak ALT 130s. Serologic workup negative for HBV and HCV infection and he had a RUQ U/S which showed hepatic steatosis.     He denies excessive EtOH and denies any recent changes in medications. Regarding his weight, his peak weight is 180 lbs which has been stable over the years. He was born in [place] and emigrated to the [address] [place] [address] fifteen years ago. 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 He denies a family history of liver disease or liver cancer.      ROS: Complete review of systems otherwise negative.      PAST MEDICAL/SURGICAL HISTORY:  Past Medical History:   Diagnosis Date   • Cardiomegaly    • Diabetes mellitus ([address])    • Pneumonia due to COVID-19 virus 5/6/2020   • Status post cystoscopy with ureteral stent placement 6/5/2019        Past Surgical History:   Procedure Laterality Date   • EYE SURGERY      FOR DECREASED VISION, PTERYGIUM   • CT CYSTO/URETERO W/LITHOTRIPSY &INDWELL STENT INSRT Right 5/28/2019    Procedure: CYSTOSCOPY, URETEROSCOPY, STONE BASKET RETRIEVAL, AND INSERTION STENT URETERAL;  Surgeon: Ronal Pimentel MD;  Location[facility];  Service: Urology       FAMILY/SOCIAL HISTORY:  Family History   Problem Relation Age of Onset   • Diabetes Mother    • Kidney disease Mother    • Other Mother         UTERINE CYST   • No Known Problems Father        Social History Tobacco Use   • Smoking status: Former   • Smokeless tobacco: Former   • Tobacco comments:     pt "quit 5 years ago"   Vaping Use   • Vaping Use: Never used   Substance Use Topics   • Alcohol use: Not Currently   • Drug use: Never       MEDICATIONS:  Current Outpatient Medications on File Prior to Visit   Medication Sig Dispense Refill   • atorvastatin (LIPITOR) 20 mg tablet Take 1 tablet (20 mg total) by mouth daily 90 tablet 2   • bismuth subsalicylate (PEPTO BISMOL) 262 MG chewable tablet Chew 2 tablets (524 mg total) 4 (four) times a day (before meals and at bedtime) 30 tablet 0   • Empagliflozin (JARDIANCE) 10 MG TABS tablet Take 1 tablet (10 mg total) by mouth daily 90 tablet 3   • ferrous sulfate 324 (65 Fe) mg Take 1 tablet (324 mg total) by mouth every other day 45 tablet 2   • lisinopril (ZESTRIL) 2.5 mg tablet Take 1 tablet (2.5 mg total) by mouth daily 90 tablet 2   • metFORMIN (GLUCOPHAGE) 1000 MG tablet Take 1 tablet (1,000 mg total) by mouth 2 (two) times a day with meals 180 tablet 3   • omeprazole (PriLOSEC) 40 MG capsule Take 1 capsule (40 mg total) by mouth 2 (two) times a day for 14 days 28 capsule 0     No current facility-administered medications on file prior to visit.        Allergies   Allergen Reactions   • Fish Allergy - Food Allergy Anaphylaxis     Tilapia-facial swelling   • Other      Annotation - [id number]: tilapia fish - swelling of throat/eyes       PHYSICAL EXAM:  /88 (BP Location: Right arm, Patient Position: Sitting, Cuff Size: Standard)   Temp 97.6 °F (36.4 °C) (Tympanic)   Ht 5' 1.02" (1.55 m)   Wt 82 kg (180 lb 12.8 oz)   BMI 34.14 kg/m²   GENERAL: NAD, AAO  HEENT: anicteric, OP clear, MMM  PULMONARY: CTAB  CARDIAC: RRR, no murmurs  ABDOMEN: S/ND/NT, normoactive BS, no hepatomegaly, spleen not palpable  EXTREMITIES: no edema  SKIN: no rashes, no palmar erythema, no spider angiomata   NEURO: normal gait, no tremor, no asterixis     LABS/RADIOLOGY/ENDOSCOPY:  Lab Results   Component Value Date    WBC 7.87 08/02/2023    HGB 13.8 08/02/2023    HCT 41.0 08/02/2023     08/02/2023    GLUF 163 (H) 08/02/2023    BUN 11 08/02/2023    CREATININE 0.78 08/02/2023    K 4.1 08/02/2023     08/02/2023    CO2 25 08/02/2023    ALKPHOS 109 08/02/2023    ALT 61 08/02/2023    AST 35 08/02/2023    CALCIUM 9.2 08/02/2023    EGFR 109 08/02/2023    TRIG 208 (H) 08/02/2023    HDL 47 08/02/2023       Computed MELD 3.0 unavailable. Necessary lab results were not found in the last year. Computed MELD-Na unavailable. Necessary lab results were not found in the last year. [phone]

## 2023-09-19 NOTE — Clinical Note
Hi this patient did not get the PA for tetracycline and has been taking Flagyl and PPI only for H. Pylori. Can we please look into the PA or switch him over to triple therapy?

## 2023-09-20 DIAGNOSIS — A04.8 H. PYLORI INFECTION: Primary | ICD-10-CM

## 2023-09-20 NOTE — TELEPHONE ENCOUNTER
No insurance information in pt's chart  Called pharmacy  Pt has no insurance, only a discount card  PA for tetracycline cannot be submitted. Pt would have to pay out of pocket with the discount card.

## 2023-09-22 ENCOUNTER — TELEPHONE (OUTPATIENT)
Dept: GASTROENTEROLOGY | Facility: CLINIC | Age: 43
End: 2023-09-22

## 2023-09-22 RX ORDER — CLARITHROMYCIN 500 MG/1
500 TABLET, COATED ORAL EVERY 12 HOURS SCHEDULED
Qty: 28 TABLET | Refills: 0 | Status: SHIPPED | OUTPATIENT
Start: 2023-09-22 | End: 2023-10-06

## 2023-09-22 RX ORDER — AMOXICILLIN 500 MG/1
1000 CAPSULE ORAL EVERY 12 HOURS SCHEDULED
Qty: 56 CAPSULE | Refills: 0 | Status: SHIPPED | OUTPATIENT
Start: 2023-09-22 | End: 2023-10-06

## 2023-09-22 NOTE — TELEPHONE ENCOUNTER
Left voicemail in Bengali for patient to call office. I don't see any insurance coverage in his chart so I need to clarify what insurance he has so [facility]

## 2023-09-22 NOTE — TELEPHONE ENCOUNTER
----- Message from Bryan Mata MD sent at 9/19/2023 10:03 AM EDT -----  Hi this patient did not get the PA for tetracycline and has been taking Flagyl and PPI only for H. Pylori. Can we please look into the PA or switch him over to triple therapy?

## 2023-09-25 NOTE — TELEPHONE ENCOUNTER
Dr Festus Schilling placed orders for amoxicillin and Biaxin to replace original order of tetracycline for H pylori. GI POD RN's can advise on instructions if needed.

## 2023-09-28 NOTE — TELEPHONE ENCOUNTER
GORANM for pt using  #[id number] to call back to review H Pylori treatment with triple abx as prescribed by Dr Madelyn Melo on 9/22/23

## 2023-11-02 ENCOUNTER — TELEPHONE (OUTPATIENT)
Dept: INTERNAL MEDICINE CLINIC | Facility: CLINIC | Age: 43
End: 2023-11-02

## 2024-03-28 DIAGNOSIS — E78.2 MIXED HYPERLIPIDEMIA: ICD-10-CM

## 2024-03-29 RX ORDER — ATORVASTATIN CALCIUM 20 MG/1
20 TABLET, FILM COATED ORAL DAILY
Qty: 90 TABLET | Refills: 2 | Status: SHIPPED | OUTPATIENT
Start: 2024-03-29

## 2024-04-24 ENCOUNTER — OFFICE VISIT (OUTPATIENT)
Dept: INTERNAL MEDICINE CLINIC | Facility: CLINIC | Age: 44
End: 2024-04-24

## 2024-04-24 DIAGNOSIS — A04.8 H. PYLORI INFECTION: ICD-10-CM

## 2024-04-24 DIAGNOSIS — E61.1 LOW SERUM IRON: ICD-10-CM

## 2024-04-24 DIAGNOSIS — G89.29 CHRONIC BILATERAL LOW BACK PAIN WITHOUT SCIATICA: ICD-10-CM

## 2024-04-24 DIAGNOSIS — E78.2 MIXED HYPERLIPIDEMIA: ICD-10-CM

## 2024-04-24 DIAGNOSIS — E66.09 CLASS 1 OBESITY DUE TO EXCESS CALORIES WITH SERIOUS COMORBIDITY AND BODY MASS INDEX (BMI) OF 34.0 TO 34.9 IN ADULT: ICD-10-CM

## 2024-04-24 DIAGNOSIS — E11.9 TYPE 2 DIABETES MELLITUS WITHOUT COMPLICATION, WITHOUT LONG-TERM CURRENT USE OF INSULIN (HCC): Primary | ICD-10-CM

## 2024-04-24 DIAGNOSIS — R80.9 MICROALBUMINURIA: ICD-10-CM

## 2024-04-24 DIAGNOSIS — Z23 ENCOUNTER FOR IMMUNIZATION: ICD-10-CM

## 2024-04-24 DIAGNOSIS — M54.50 CHRONIC BILATERAL LOW BACK PAIN WITHOUT SCIATICA: ICD-10-CM

## 2024-04-24 PROBLEM — I10 HTN (HYPERTENSION): Status: RESOLVED | Noted: 2023-08-24 | Resolved: 2024-04-24

## 2024-04-24 PROBLEM — D64.9 ANEMIA: Status: RESOLVED | Noted: 2023-08-24 | Resolved: 2024-04-24

## 2024-04-24 PROBLEM — Z00.00 ANNUAL PHYSICAL EXAM: Status: RESOLVED | Noted: 2023-08-02 | Resolved: 2024-04-24

## 2024-04-24 LAB — SL AMB POCT HEMOGLOBIN AIC: 9.8 (ref ?–6.5)

## 2024-04-24 PROCEDURE — 90471 IMMUNIZATION ADMIN: CPT | Performed by: PHYSICIAN ASSISTANT

## 2024-04-24 PROCEDURE — 99214 OFFICE O/P EST MOD 30 MIN: CPT | Performed by: PHYSICIAN ASSISTANT

## 2024-04-24 PROCEDURE — 83036 HEMOGLOBIN GLYCOSYLATED A1C: CPT | Performed by: PHYSICIAN ASSISTANT

## 2024-04-24 PROCEDURE — 90746 HEPB VACCINE 3 DOSE ADULT IM: CPT | Performed by: PHYSICIAN ASSISTANT

## 2024-04-24 RX ORDER — LISINOPRIL 2.5 MG/1
2.5 TABLET ORAL DAILY
Qty: 90 TABLET | Refills: 2 | Status: SHIPPED | OUTPATIENT
Start: 2024-04-24

## 2024-04-24 RX ORDER — FERROUS SULFATE 324(65)MG
324 TABLET, DELAYED RELEASE (ENTERIC COATED) ORAL EVERY OTHER DAY
Qty: 45 TABLET | Refills: 2 | Status: SHIPPED | OUTPATIENT
Start: 2024-04-24

## 2024-04-24 RX ORDER — ATORVASTATIN CALCIUM 20 MG/1
20 TABLET, FILM COATED ORAL DAILY
Qty: 90 TABLET | Refills: 2 | Status: SHIPPED | OUTPATIENT
Start: 2024-04-24 | End: 2024-05-06 | Stop reason: SDUPTHER

## 2024-04-30 ENCOUNTER — TELEPHONE (OUTPATIENT)
Dept: INTERNAL MEDICINE CLINIC | Facility: CLINIC | Age: 44
End: 2024-04-30

## 2024-04-30 VITALS
HEIGHT: 61 IN | WEIGHT: 180 LBS | SYSTOLIC BLOOD PRESSURE: 125 MMHG | BODY MASS INDEX: 33.99 KG/M2 | TEMPERATURE: 98.6 F | HEART RATE: 79 BPM | OXYGEN SATURATION: 98 % | DIASTOLIC BLOOD PRESSURE: 76 MMHG

## 2024-04-30 PROBLEM — Z23 ENCOUNTER FOR IMMUNIZATION: Status: ACTIVE | Noted: 2024-04-30

## 2024-04-30 NOTE — ASSESSMENT & PLAN NOTE
Lab Results   Component Value Date    HGBA1C 9.8 (A) 04/24/2024     Has been out of medication for up to a month. Likely contributing to increase in A1c, from 8.4%. Restart metformin 1000 mg BID. Discussed adding on a second oral medication. He is agreeable. I will talk with the medication assistance program, Ninoska Killian. Reviewed nutrition and exercise recommendations. Up to date on eye exam, will obtain records from [facility]. Up to date on foot exam. Urine micro due today. Will recheck A1c in 3 months, return sooner if needed.

## 2024-04-30 NOTE — ASSESSMENT & PLAN NOTE
Symptoms significantly improved. Recommended rechecking H. Pylori stool. He was agreeable. No PPI for at least 7 days prior. Reviewed anti-GERD diet.

## 2024-04-30 NOTE — ASSESSMENT & PLAN NOTE
Lab Results   Component Value Date    WBC 7.87 08/02/2023    HGB 13.8 08/02/2023    HCT 41.0 08/02/2023    MCV 84 08/02/2023     08/02/2023      Lab Results   Component Value Date    IRON 54 (L) 08/02/2023    TIBC 494 (H) 08/02/2023    FERRITIN 16 (L) 08/02/2023        Continue ferrous sulfate every other day.

## 2024-04-30 NOTE — ASSESSMENT & PLAN NOTE
Lab Results   Component Value Date    CHOLESTEROL 171 08/02/2023    CHOLESTEROL 138 09/16/2022    CHOLESTEROL 157 10/11/2017     Lab Results   Component Value Date    HDL 47 08/02/2023    HDL 43 09/16/2022    HDL 40 10/11/2017     Lab Results   Component Value Date    TRIG 208 (H) 08/02/2023    TRIG 196 (H) 09/16/2022    TRIG 146 10/11/2017     Lab Results   Component Value Date    NONHDLC 124 08/02/2023    NONHDLC 95 09/16/2022      Lab Results   Component Value Date    LDLCALC 82 08/02/2023      Due for repeat lipid panel next visit. Continue atorvastatin 20 mg daily. Low fat diet.

## 2024-04-30 NOTE — ASSESSMENT & PLAN NOTE
GI recommended Hepatitis B immunization series. First given today. Will give second at next visit. Tolerated well.

## 2024-04-30 NOTE — ASSESSMENT & PLAN NOTE
Controlled. Continue supportive treatment. Warm/cool compresses. Stretch throughout the day. Use good body mechanics. Diclofenac gel as needed. Return if symptoms worsen.

## 2024-04-30 NOTE — PROGRESS NOTES
Name: Xu Long      : 1980      MRN: [id number]  Encounter Provider: Sandra Bui PA-C  Encounter Date: 2024   Encounter department: [facility]    Assessment & Plan     1. Type 2 diabetes mellitus without complication, without long-term current use of insulin (HCC)  Assessment & Plan:    Lab Results   Component Value Date    HGBA1C 9.8 (A) 2024     Has been out of medication for up to a month. Likely contributing to increase in A1c, from 8.4%. Restart metformin 1000 mg BID. Discussed adding on a second oral medication. He is agreeable. I will talk with the medication assistance program, Ninoska Killian. Reviewed nutrition and exercise recommendations. Up to date on eye exam, will obtain records from [facility]. Up to date on foot exam. Urine micro due today. Will recheck A1c in 3 months, return sooner if needed.     Orders:  -     POCT hemoglobin A1c  -     metFORMIN (GLUCOPHAGE) 1000 MG tablet; Take 1 tablet (1,000 mg total) by mouth 2 (two) times a day with meals  -     Albumin / creatinine urine ratio; Future    2. Mixed hyperlipidemia  Assessment & Plan:  Lab Results   Component Value Date    CHOLESTEROL 171 2023    CHOLESTEROL 138 2022    CHOLESTEROL 157 10/11/2017     Lab Results   Component Value Date    HDL 47 2023    HDL 43 2022    HDL 40 10/11/2017     Lab Results   Component Value Date    TRIG 208 (H) 2023    TRIG 196 (H) 2022    TRIG 146 10/11/2017     Lab Results   Component Value Date    NONHDLC 124 2023    NONHDLC 95 2022      Lab Results   Component Value Date    LDLCALC 82 2023      Due for repeat lipid panel next visit. Continue atorvastatin 20 mg daily. Low fat diet.     Orders:  -     atorvastatin (LIPITOR) 20 mg tablet; Take 1 tablet (20 mg total) by mouth daily    3. Microalbuminuria  Assessment & Plan:  Due to repeat urine micro. Continue lisinopril 2.5  mg daily.     Orders:  -     lisinopril (ZESTRIL) 2.5 mg tablet; Take 1 tablet (2.5 mg total) by mouth daily    4. Chronic bilateral low back pain without sciatica  Assessment & Plan:  Controlled. Continue supportive treatment. Warm/cool compresses. Stretch throughout the day. Use good body mechanics. Diclofenac gel as needed. Return if symptoms worsen.      5. Low serum iron  Assessment & Plan:  Lab Results   Component Value Date    WBC 7.87 08/02/2023    HGB 13.8 08/02/2023    HCT 41.0 08/02/2023    MCV 84 08/02/2023     08/02/2023      Lab Results   Component Value Date    IRON 54 (L) 08/02/2023    TIBC 494 (H) 08/02/2023    FERRITIN 16 (L) 08/02/2023        Continue ferrous sulfate every other day.    Orders:  -     ferrous sulfate 324 (65 Fe) mg; Take 1 tablet (324 mg total) by mouth every other day    6. H. pylori infection  Assessment & Plan:  Symptoms significantly improved. Recommended rechecking H. Pylori stool. He was agreeable. No PPI for at least 7 days prior. Reviewed anti-GERD diet.     Orders:  -     H. pylori antigen, stool; Future    7. Encounter for immunization  Assessment & Plan:  GI recommended Hepatitis B immunization series. First given today. Will give second at next visit. Tolerated well.     Orders:  -     HEPATITIS B VACCINE ADULT IM    8. Class 1 obesity due to excess calories with serious comorbidity and body mass index (BMI) of 34.0 to 34.9 in adult      BMI Counseling: Body mass index is 34.01 kg/m². The BMI is above normal. Nutrition recommendations include decreasing portion sizes, encouraging healthy choices of fruits and vegetables, decreasing fast food intake, consuming healthier snacks, limiting drinks that contain sugar, moderation in carbohydrate intake, increasing intake of lean protein, reducing intake of saturated and trans fat and reducing intake of cholesterol. Exercise recommendations include moderate physical activity 150 minutes/week, exercising 3-5 times per  week and strength training exercises. No pharmacotherapy was ordered. Rationale for BMI follow-up plan is due to patient being overweight or obese.     Depression Screening and Follow-up Plan: Patient was screened for depression during today's encounter. They screened negative with a PHQ-2 score of 1.        Subjective      Patient is a 44 year old male with a PMH of chronic back pain, T2DM, microalbuminuria, GERD, and HLD presenting for follow up. He states he has been compliant with his medications, but did run out of medication several weeks ago. Denies any medication side effects. He is not currently checking his sugar, but he denies any hypoglycemic symptoms. He states overall he is feeling well. His chronic back pain has been controlled with use of diclofenac gel. He has a history of GERD and was treated for GERD about 6-8 months ago. States his overall symptoms are improved, but has occasional reflux or indigestion. Usually food induced. He has no complaints today.   Use of  services was utilized during visit.       Review of Systems   Constitutional:  Negative for appetite change, chills, diaphoresis, fatigue, fever and unexpected weight change.   Eyes:  Negative for visual disturbance.   Respiratory:  Negative for cough, chest tightness, shortness of breath and wheezing.    Cardiovascular:  Negative for chest pain, palpitations and leg swelling.   Gastrointestinal:  Negative for abdominal pain, blood in stool, constipation, diarrhea, nausea and vomiting.   Endocrine: Negative for cold intolerance, heat intolerance, polydipsia, polyphagia and polyuria.   Skin:  Negative for rash.   Neurological:  Negative for dizziness, weakness, light-headedness, numbness and headaches.   Hematological:  Negative for adenopathy.   Psychiatric/Behavioral:  Negative for dysphoric mood, self-injury, sleep disturbance and suicidal ideas. The patient is not nervous/anxious.        No current outpatient medications on  "file prior to visit.       Objective     /76 (BP Location: Left arm, Patient Position: Sitting, Cuff Size: Large)   Pulse 79   Temp 98.6 °F (37 °C) (Temporal)   Ht 5' 1\" (1.549 m)   Wt 81.6 kg (180 lb)   SpO2 98%   BMI 34.01 kg/m²     Physical Exam  Vitals and nursing note reviewed.   Constitutional:       General: He is awake. He is not in acute distress.     Appearance: Normal appearance. He is well-developed, well-groomed and overweight. He is not ill-appearing.   HENT:      Head: Normocephalic and atraumatic.   Eyes:      General: No scleral icterus.     Conjunctiva/sclera: Conjunctivae normal.   Cardiovascular:      Rate and Rhythm: Normal rate and regular rhythm.      Pulses: Normal pulses.           Radial pulses are 2+ on the right side and 2+ on the left side.      Heart sounds: Normal heart sounds. No murmur heard.  Pulmonary:      Effort: Pulmonary effort is normal. No respiratory distress.      Breath sounds: Normal breath sounds and air entry. No decreased air movement. No decreased breath sounds, wheezing, rhonchi or rales.   Abdominal:      General: Abdomen is flat. Bowel sounds are normal. There is no distension.      Palpations: Abdomen is soft. There is no mass.      Tenderness: There is no abdominal tenderness. There is no guarding or rebound.      Hernia: No hernia is present.   Musculoskeletal:         General: Normal range of motion.      Cervical back: Neck supple.      Right lower leg: No edema.      Left lower leg: No edema.   Lymphadenopathy:      Cervical: No cervical adenopathy.   Skin:     General: Skin is warm.      Coloration: Skin is not jaundiced.      Findings: No rash.   Neurological:      General: No focal deficit present.      Mental Status: He is alert and oriented to person, place, and time. Mental status is at baseline.      Motor: Motor function is intact.      Coordination: Coordination is intact.      Gait: Gait is intact.   Psychiatric:         Attention and " Perception: Attention normal.         Mood and Affect: Mood and affect normal.         Speech: Speech normal.         Behavior: Behavior normal. Behavior is cooperative.       Sandra Bui PA-C

## 2024-04-30 NOTE — TELEPHONE ENCOUNTER
Jonas Xiao,   This patient does not have a SSN. I would to have him apply for medication assistance. Likely a [employer] medication. I did discuss with the patient at his recent visit and he would like to proceed. Thank you!

## 2024-04-30 NOTE — PATIENT INSTRUCTIONS

## 2024-05-06 DIAGNOSIS — E11.9 TYPE 2 DIABETES MELLITUS WITHOUT COMPLICATION, WITHOUT LONG-TERM CURRENT USE OF INSULIN (HCC): ICD-10-CM

## 2024-05-06 DIAGNOSIS — E78.2 MIXED HYPERLIPIDEMIA: ICD-10-CM

## 2024-05-06 RX ORDER — ATORVASTATIN CALCIUM 20 MG/1
20 TABLET, FILM COATED ORAL DAILY
Qty: 90 TABLET | Refills: 2 | Status: SHIPPED | OUTPATIENT
Start: 2024-05-06

## 2024-05-06 NOTE — TELEPHONE ENCOUNTER
Received call from patient. Interpretor ID [id number]. Patient stated he does not have any of his medications. Patient stated the pharmacy does not have them.     Spoke with pharmacy on the phone at . Introduced self and role. Pharmacist updated patient does not have insurance. Discount card applied. Pharmacy has script for lisinopril. Iron OTC.     Resent scripts for metformin and atorvastatin to [facility] off [address].     Attempted to reach patient back with an update. No answer. Unable to leave a voice message.

## 2024-05-07 ENCOUNTER — PATIENT OUTREACH (OUTPATIENT)
Dept: INTERNAL MEDICINE CLINIC | Facility: CLINIC | Age: 44
End: 2024-05-07

## 2024-05-07 NOTE — PROGRESS NOTES
Medication Patient Assistance Program (MPAP) Specialist  received IB message from clinician requesting patient assistance for Glyxambi. MPAP specialist reviewed patient chart prior to outreach. MPAP specialist attempted to reach patient via  ID # [id number]. MPAP specialist successfully reached patient. MPAP specialist introduced self and explained her role. MPAP specialist determined patient's eligibility for program. MPAP specialist explained required documentation for patient assistance program for Glyxambi through [facility]. MPAP specialist detailed acceptable forms of income verification. Patient stated he can bring a copy of income verification. MPAP specialist explained application may be sent to patient or MPAP may help patient complete it. Patient requested MPAP specialist assistance with application. MPAP specialist has made an appointment for patient to meet with her on 05/14/2024 at 8:00. MPAP specialist has added appointment to her calendar. MPAP specialist provided patient with her number and encouraged patient to call with any questions. Patient thanked MPAP specialist. MPAP specialist thanked patient . MPAP specialist has placed personal reminder.

## 2024-05-14 ENCOUNTER — PATIENT OUTREACH (OUTPATIENT)
Dept: INTERNAL MEDICINE CLINIC | Facility: CLINIC | Age: 44
End: 2024-05-14

## 2024-05-14 NOTE — PROGRESS NOTES
Medication Patient Assistance Program (MPAP) Specialist  met with patient to fill out application for Glyxambi through [facility] patient assistance program. MPAP specialist reviewed patient chart prior to outreach. MPAP specialist spoke with patient Via  ID # [id number]. MPAP specialist introduced herself. MPAP specialist helped patient to complete his application. MPAP specialist explained each section. MPAP specialist provided a [place] application to patient. MPAP specialist answered questions patient had regarding each section of the application. MPAP specialist received income verification from patient. MPAP specialist reviewed completed patient portion of application. MPAP specialist explained [facility] patient assistance program and its refill process. MPAP specialist provided patient [facility] number to call refills. MPAP specialist asked if patient had any questions. Patient stated he did not have questions about the the program. Patient stated he had not been able to  his medications at his pharmacy. Patient stated pharmacy said they were not there. MPAP specialist spoke with RN. RN Note in chart states medication was called to pharmacy. MPAP specialist explained she will follow up with pharmacy. Patient stated understanding. MPAP specialist stated she will call patient with results of her conversation with Pharmacy. MPAP specialist provided patient her card and encouraged him to call with any question. MPAP specialist thanked patient. Patient thanked MPAP specialist. MPAP specialist gave clinician portion to clinician. MPAP specialist received clinician's portion of application. MPAP specialist faxed completed application to [facility]. MPAP specialist will follow up with [facility] in 7-10 business days. MPAP specialist has placed a personal reminder.MPAP specialist has updated care coordination note. MPAP specialist has updated patient tracking sheet. MPAP specialist has scanned completed faxed  application into [employer].   MPAP specialist reached out to [facility] to [facility]. [facility]el. Per pharmacy tech patient does not have a prescription available. [facility] asked to speak to pharmacist. Pharmacist Fausto stated he needed a prescription and would take a verbal order. [facility] had RN speak with pharmacist. Per RN patient prescription is available under a different name. MPAP specialist thanked RN and Pharmacist.   [facility] called patient via  ID # [id number] and explained he needed to use his first name and Ramírez and give his address and date of birth to receive his prescription. Patient stated understanding. MPAP specialist thanked patient.

## 2024-05-28 ENCOUNTER — PATIENT OUTREACH (OUTPATIENT)
Dept: INTERNAL MEDICINE CLINIC | Facility: CLINIC | Age: 44
End: 2024-05-28

## 2024-05-28 NOTE — PROGRESS NOTES
Medication Patient Assistance Program (MPAP) Specialist  received personal reminder notification regarding patient's application for Glyxambi through [facility] patient assistance program. MPAP specialist reviewed patient chart prior to outreach. MPAP specialist spoke with [facility] Florin. Per [facility] patient's income needs to be on a letter head with patient name and date of birth. Per [facility] clinician's office may provide verification. MPAP specialist thanked [employer] rep. MPAP specialist will compose letter and fax to [facility]. MPAP specialist has placed Personal reminder.

## 2024-06-06 ENCOUNTER — PATIENT OUTREACH (OUTPATIENT)
Dept: INTERNAL MEDICINE CLINIC | Facility: CLINIC | Age: 44
End: 2024-06-06

## 2024-06-06 NOTE — PROGRESS NOTES
Medication Patient Assistance Program (MPAP) Specialist  received personal reminder notification regarding patient's application for Glyxambi through [facility] patient assistance program. MPAP specialist reviewed patient chart prior to outreach. MPAP specialist has composed letter and faxed to [facility]. MPAP specialist will follow up with  cares in 5-7 business days to determine patient's application status. MPAP specialist has placed a personal reminder.

## 2024-06-06 NOTE — LETTER
June 6, 2024     [facility] Patient assistance program    Patient: Xu Long   YOB: 1980   Date of Visit: 6/6/2024     To whom it may concern,     Xu Long has provided his income verification for the patient assistance program. Per the letter furnished by his employer [employer], the patient  works part time and his current earnings are $300 per week. I am enclosing the letter signed by his employer.   Thank you for your time and consideration.   Sincerely,           Ninoska Killian  Medication Assistance   [phone]    CC: No Recipients

## 2024-06-24 ENCOUNTER — PATIENT OUTREACH (OUTPATIENT)
Dept: INTERNAL MEDICINE CLINIC | Facility: CLINIC | Age: 44
End: 2024-06-24

## 2024-06-24 NOTE — PROGRESS NOTES
Medication Patient Assistance Program (MPAP) Specialist  received personal reminder notification regarding patient's application for Glyxambi through [facility] patient assistance program. MPAP specialist reviewed patient chart prior to outreach. Per chart review patient has been approved for Glyxambi through [facility] patient assistance program. MPAP specialist reached out to [facility] to confirm approval dates and Glyxambi shipment. MPAP specialist spoke with Katelynn, [facility] representative. Per [facility] rep patient is approved from 05/28/2024-05/28/2025. [facility] rep stated patient's Glyxambi was delivered on 06/11/2024. [facility] rep stated patient's next shipment is available 9/10/2024. Patient must call to request refill. Patient may renew application on 02/28/2025. MPAP specialist has placed personal reminders regarding patient's refills and renewal. MPAP specialist has added patient to her [facility] patient list. MPAP specialist has updated Care Coordination note. MPAP specialist has updated patient tracking sheet. MPAP specialist will remain available for patient assistance program questions.

## 2024-07-17 ENCOUNTER — TELEPHONE (OUTPATIENT)
Dept: INTERNAL MEDICINE CLINIC | Facility: CLINIC | Age: 44
End: 2024-07-17

## 2024-07-17 NOTE — TELEPHONE ENCOUNTER
----- Message from Sandra Bui PA-C sent at 7/16/2024  2:21 PM EDT -----  Please call patient to schedule annual physical for 8/2 or later. Thank you

## 2024-08-08 ENCOUNTER — TELEPHONE (OUTPATIENT)
Dept: INTERNAL MEDICINE CLINIC | Facility: CLINIC | Age: 44
End: 2024-08-08

## 2024-08-08 PROBLEM — Z23 ENCOUNTER FOR IMMUNIZATION: Status: RESOLVED | Noted: 2024-04-30 | Resolved: 2024-08-08

## 2024-08-08 NOTE — TELEPHONE ENCOUNTER
Patient is calling regarding cancelling an appointment.    Date/Time:8/8/2024 @ 0800    Patient was rescheduled: YES [] NO [x]    Patient requesting call back to reschedule: YES [x] NO []   Please call him back to reschedule  his physical appointment. He has to work this morning. He was looking for something on next Wednesday.

## 2024-08-21 ENCOUNTER — OFFICE VISIT (OUTPATIENT)
Dept: INTERNAL MEDICINE CLINIC | Facility: CLINIC | Age: 44
End: 2024-08-21

## 2024-08-21 VITALS
SYSTOLIC BLOOD PRESSURE: 117 MMHG | HEART RATE: 58 BPM | TEMPERATURE: 98.5 F | HEIGHT: 65 IN | DIASTOLIC BLOOD PRESSURE: 71 MMHG | WEIGHT: 167.31 LBS | OXYGEN SATURATION: 99 % | BODY MASS INDEX: 27.88 KG/M2

## 2024-08-21 DIAGNOSIS — E78.2 MIXED HYPERLIPIDEMIA: ICD-10-CM

## 2024-08-21 DIAGNOSIS — E11.9 TYPE 2 DIABETES MELLITUS WITHOUT COMPLICATION, WITHOUT LONG-TERM CURRENT USE OF INSULIN (HCC): ICD-10-CM

## 2024-08-21 DIAGNOSIS — E61.1 LOW SERUM IRON: ICD-10-CM

## 2024-08-21 DIAGNOSIS — R80.9 MICROALBUMINURIA: ICD-10-CM

## 2024-08-21 DIAGNOSIS — Z00.00 ANNUAL PHYSICAL EXAM: Primary | ICD-10-CM

## 2024-08-21 DIAGNOSIS — Z12.5 SCREENING FOR MALIGNANT NEOPLASM OF PROSTATE: ICD-10-CM

## 2024-08-21 DIAGNOSIS — E66.3 OVERWEIGHT WITH BODY MASS INDEX (BMI) OF 27 TO 27.9 IN ADULT: ICD-10-CM

## 2024-08-21 DIAGNOSIS — Z23 ENCOUNTER FOR IMMUNIZATION: ICD-10-CM

## 2024-08-21 PROBLEM — A04.8 H. PYLORI INFECTION: Status: RESOLVED | Noted: 2024-04-24 | Resolved: 2024-08-21

## 2024-08-21 LAB
LEFT EYE DIABETIC RETINOPATHY: NORMAL
LEFT EYE IMAGE QUALITY: NORMAL
LEFT EYE MACULAR EDEMA: NORMAL
LEFT EYE OTHER RETINOPATHY: NORMAL
RIGHT EYE DIABETIC RETINOPATHY: NORMAL
RIGHT EYE IMAGE QUALITY: NORMAL
RIGHT EYE MACULAR EDEMA: NORMAL
RIGHT EYE OTHER RETINOPATHY: NORMAL
SEVERITY (EYE EXAM): NORMAL
SL AMB POCT HEMOGLOBIN AIC: 9.7 (ref ?–6.5)

## 2024-08-21 PROCEDURE — 90471 IMMUNIZATION ADMIN: CPT | Performed by: PHYSICIAN ASSISTANT

## 2024-08-21 PROCEDURE — 83036 HEMOGLOBIN GLYCOSYLATED A1C: CPT | Performed by: PHYSICIAN ASSISTANT

## 2024-08-21 PROCEDURE — 90746 HEPB VACCINE 3 DOSE ADULT IM: CPT | Performed by: PHYSICIAN ASSISTANT

## 2024-08-21 PROCEDURE — 99396 PREV VISIT EST AGE 40-64: CPT | Performed by: PHYSICIAN ASSISTANT

## 2024-08-21 RX ORDER — EMPAGLIFLOZIN AND LINAGLIPTIN 10; 5 MG/1; MG/1
1 TABLET, FILM COATED ORAL DAILY
COMMUNITY

## 2024-08-21 RX ORDER — FERROUS SULFATE 324(65)MG
324 TABLET, DELAYED RELEASE (ENTERIC COATED) ORAL EVERY OTHER DAY
Qty: 45 TABLET | Refills: 2 | Status: SHIPPED | OUTPATIENT
Start: 2024-08-21

## 2024-08-21 RX ORDER — ATORVASTATIN CALCIUM 20 MG/1
20 TABLET, FILM COATED ORAL DAILY
Qty: 90 TABLET | Refills: 2 | Status: SHIPPED | OUTPATIENT
Start: 2024-08-21

## 2024-08-21 RX ORDER — LISINOPRIL 2.5 MG/1
2.5 TABLET ORAL DAILY
Qty: 90 TABLET | Refills: 2 | Status: SHIPPED | OUTPATIENT
Start: 2024-08-21

## 2024-08-21 NOTE — PATIENT INSTRUCTIONS
"Patient Education     Routine physical for adults   The Basics   Written by the doctors and editors at [facility]   What is a physical? -- A physical is a routine visit, or \"check-up,\" with your doctor. You might also hear it called a \"wellness visit\" or \"preventive visit.\"  During each visit, the doctor will:   Ask about your physical and mental health   Ask about your habits, behaviors, and lifestyle   Do an exam   Give you vaccines if needed   Talk to you about any medicines you take   Give advice about your health   Answer your questions  Getting regular check-ups is an important part of taking care of your health. It can help your doctor find and treat any problems you have. But it's also important for preventing health problems.  A routine physical is different from a \"sick visit.\" A sick visit is when you see a doctor because of a health concern or problem. Since physicals are scheduled ahead of time, you can think about what you want to ask the doctor.  How often should I get a physical? -- It depends on your age and health. In general, for people age 21 years and older:   If you are younger than 50 years, you might be able to get a physical every 3 years.   If you are 50 years or older, your doctor might recommend a physical every year.  If you have an ongoing health condition, like diabetes or high blood pressure, your doctor will probably want to see you more often.  What happens during a physical? -- In general, each visit will include:   Physical exam - The doctor or nurse will check your height, weight, heart rate, and blood pressure. They will also look at your eyes and ears. They will ask about how you are feeling and whether you have any symptoms that bother you.   Medicines - It's a good idea to bring a list of all the medicines you take to each doctor visit. Your doctor will talk to you about your medicines and answer any questions. Tell them if you are having any side effects that bother you. You " "should also tell them if you are having trouble paying for any of your medicines.   Habits and behaviors - This includes:   Your diet   Your exercise habits   Whether you smoke, drink alcohol, or use drugs   Whether you are sexually active   Whether you feel safe at home  Your doctor will talk to you about things you can do to improve your health and lower your risk of health problems. They will also offer help and support. For example, if you want to quit smoking, they can give you advice and might prescribe medicines. If you want to improve your diet or get more physical activity, they can help you with this, too.   Lab tests, if needed - The tests you get will depend on your age and situation. For example, your doctor might want to check your:   Cholesterol   Blood sugar   Iron level   Vaccines - The recommended vaccines will depend on your age, health, and what vaccines you already had. Vaccines are very important because they can prevent certain serious or deadly infections.   Discussion of screening - \"Screening\" means checking for diseases or other health problems before they cause symptoms. Your doctor can recommend screening based on your age, risk, and preferences. This might include tests to check for:   Cancer, such as breast, prostate, cervical, ovarian, colorectal, prostate, lung, or skin cancer   Sexually transmitted infections, such as chlamydia and gonorrhea   Mental health conditions like depression and anxiety  Your doctor will talk to you about the different types of screening tests. They can help you decide which screenings to have. They can also explain what the results might mean.   Answering questions - The physical is a good time to ask the doctor or nurse questions about your health. If needed, they can refer you to other doctors or specialists, too.  Adults older than 65 years often need other care, too. As you get older, your doctor will talk to you about:   How to prevent falling at " home   Hearing or vision tests   Memory testing   How to take your medicines safely   Making sure that you have the help and support you need at home  All topics are updated as new evidence becomes available and our peer review process is complete.  This topic retrieved from [employer] on: May 02, 2024.  Topic 131486 Version 1.0  Release: 32.4.3 - C32.122  © 2024 UpToDate, Inc. and/or its affiliates. All rights reserved.  Consumer Information Use and Disclaimer   Disclaimer: This generalized information is a limited summary of diagnosis, treatment, and/or medication information. It is not meant to be comprehensive and should be used as a tool to help the user understand and/or assess potential diagnostic and treatment options. It does NOT include all information about conditions, treatments, medications, side effects, or risks that may apply to a specific patient. It is not intended to be medical advice or a substitute for the medical advice, diagnosis, or treatment of a health care provider based on the health care provider's examination and assessment of a patient's specific and unique circumstances. Patients must speak with a health care provider for complete information about their health, medical questions, and treatment options, including any risks or benefits regarding use of medications. This information does not endorse any treatments or medications as safe, effective, or approved for treating a specific patient. UpToDate, Inc. and its affiliates disclaim any warranty or liability relating to this information or the use thereof.The use of this information is governed by the Terms of Use, available at https://www.woltersLaunchCyteuwer.com/en/know/clinical-effectiveness-terms. 2024© UpToDate, Inc. and its affiliates and/or licensors. All rights reserved.  Copyright   © 2024 UpToDate, Inc. and/or its affiliates. All rights reserved.

## 2024-08-21 NOTE — PROGRESS NOTES
Adult Annual Physical  Name: Xu Long      : 1980      MRN: [id number]  Encounter Provider: Sandra Bui PA-C  Encounter Date: 2024   Encounter department: [facility]    Assessment & Plan   1. Annual physical exam  Assessment & Plan:  Discussed general health recommendations and screening guidelines. Recommend routine dental and eye exams. Due for screening labs. Hepatitis B immunization given. Tolerated well. Up to date on all other recommended immunizations. Next physical one year.   2. Type 2 diabetes mellitus without complication, without long-term current use of insulin (HCC)  Assessment & Plan:    Lab Results   Component Value Date    HGBA1C 9.7 (A) 2024     Slight improvement from 9.8%. Goal A1c less than 7.0%. Has been out of medication for up to a month. Patient frequently runs out of medications. Discussed that before he runs out, he needs to call the pharmacy for a refill. If there is no refill the pharmacy or the patient can contact the office.  Restart metformin 1000 mg BID. Continue Glyxambi 10-5 mg daily. Reviewed nutrition and exercise recommendations. Foot and eye exam done today. Urine micro due today. Will recheck A1c in 3 months, return sooner if needed.   Orders:  -     POCT hemoglobin A1c  -     metFORMIN (GLUCOPHAGE) 1000 MG tablet; Take 1 tablet (1,000 mg total) by mouth 2 (two) times a day with meals  -     Comprehensive metabolic panel; Future  -     TSH, 3rd generation with Free T4 reflex; Future  -     Albumin / creatinine urine ratio; Future  -     IRIS Diabetic eye exam  3. Microalbuminuria  Assessment & Plan:  Due to repeat urine micro. Continue lisinopril 2.5 mg daily.   Orders:  -     lisinopril (ZESTRIL) 2.5 mg tablet; Take 1 tablet (2.5 mg total) by mouth daily  -     Albumin / creatinine urine ratio; Future  4. Mixed hyperlipidemia  Assessment & Plan:  Lab Results   Component Value Date    CHOLESTEROL 171  08/02/2023    CHOLESTEROL 138 09/16/2022    CHOLESTEROL 157 10/11/2017     Lab Results   Component Value Date    HDL 47 08/02/2023    HDL 43 09/16/2022    HDL 40 10/11/2017     Lab Results   Component Value Date    TRIG 208 (H) 08/02/2023    TRIG 196 (H) 09/16/2022    TRIG 146 10/11/2017     Lab Results   Component Value Date    NONHDLC 124 08/02/2023    NONHDLC 95 09/16/2022      Lab Results   Component Value Date    LDLCALC 82 08/02/2023      Due for repeat lipid panel. Continue atorvastatin 20 mg daily. Low fat diet.   Orders:  -     atorvastatin (LIPITOR) 20 mg tablet; Take 1 tablet (20 mg total) by mouth daily  -     Lipid panel; Future  5. Low serum iron  Assessment & Plan:  Lab Results   Component Value Date    WBC 7.87 08/02/2023    HGB 13.8 08/02/2023    HCT 41.0 08/02/2023    MCV 84 08/02/2023     08/02/2023      Lab Results   Component Value Date    IRON 54 (L) 08/02/2023    TIBC 494 (H) 08/02/2023    FERRITIN 16 (L) 08/02/2023      Due for repeat iron panel and CBC. Continue ferrous sulfate every other day.   Orders:  -     ferrous sulfate 324 (65 Fe) mg; Take 1 tablet (324 mg total) by mouth every other day  -     CBC and differential; Future  -     Iron Panel (Includes Ferritin, Iron Sat%, Iron, and TIBC); Future  6. Screening for malignant neoplasm of prostate  -     PSA, Total Screen; Future  7. Encounter for immunization  -     HEPATITIS B VACCINE ADULT IM  8. Overweight with body mass index (BMI) of 27 to 27.9 in adult    Immunizations and preventive care screenings were discussed with patient today. Appropriate education was printed on patient's after visit summary.    Discussed risks and benefits of prostate cancer screening. We discussed the controversial history of PSA screening for prostate cancer in the [place] as well as the risk of over detection and over treatment of prostate cancer by way of PSA screening.  The patient understands that PSA blood testing is an imperfect way to  screen for prostate cancer and that elevated PSA levels in the blood may also be caused by infection, inflammation, prostatic trauma or manipulation, urological procedures, or by benign prostatic enlargement.    The role of the digital rectal examination in prostate cancer screening was also discussed and I discussed with him that there is large interobserver variability in the findings of digital rectal examination.    Counseling:  Alcohol/drug use: discussed moderation in alcohol intake, the recommendations for healthy alcohol use, and avoidance of illicit drug use.  Dental Health: discussed importance of regular tooth brushing, flossing, and dental visits.  Injury prevention: discussed safety/seat belts, safety helmets, smoke detectors, carbon dioxide detectors, and smoking near bedding or upholstery.  Sexual health: discussed sexually transmitted diseases, partner selection, use of condoms, avoidance of unintended pregnancy, and contraceptive alternatives.  Exercise: the importance of regular exercise/physical activity was discussed. Recommend exercise 3-5 times per week for at least 30 minutes.     BMI Counseling: Body mass index is 27.84 kg/m². The BMI is above normal. Nutrition recommendations include decreasing portion sizes, encouraging healthy choices of fruits and vegetables, decreasing fast food intake, consuming healthier snacks, limiting drinks that contain sugar, moderation in carbohydrate intake, increasing intake of lean protein, reducing intake of saturated and trans fat and reducing intake of cholesterol. Exercise recommendations include moderate physical activity 150 minutes/week, exercising 3-5 times per week and strength training exercises. No pharmacotherapy was ordered. Rationale for BMI follow-up plan is due to patient being overweight or obese.         History of Present Illness     Adult Annual Physical:  Patient presents for annual physical.     Diet and Physical Activity:  -  Diet/Nutrition: well balanced diet and limited junk food.  - Exercise: no formal exercise.    General Health:  - Sleep: sleeps well and 7-8 hours of sleep on average.  - Hearing: normal hearing bilateral ears.  - Vision: no vision problems and most recent eye exam > 1 year ago.  - Dental: brushes teeth twice daily and no dental visits for > 1 year.     Health:  - History of STDs: no.   - Urinary symptoms: none.     Advanced Care Planning:  - Has an advanced directive?: no    - Has a durable medical POA?: no    - ACP document given to patient?: no      Review of Systems   Constitutional:  Negative for appetite change, chills, diaphoresis, fatigue, fever and unexpected weight change.   HENT:  Negative for congestion, hearing loss, sore throat, tinnitus and trouble swallowing.    Eyes:  Negative for visual disturbance.   Respiratory:  Negative for cough, chest tightness, shortness of breath and wheezing.    Cardiovascular:  Negative for chest pain, palpitations and leg swelling.   Gastrointestinal:  Negative for abdominal pain, blood in stool, constipation, diarrhea, nausea and vomiting.   Endocrine: Negative for cold intolerance, heat intolerance, polydipsia, polyphagia and polyuria.   Genitourinary:  Negative for decreased urine volume, difficulty urinating, dysuria, flank pain, frequency, hematuria, testicular pain and urgency.   Musculoskeletal:  Negative for arthralgias and myalgias.   Skin:  Negative for rash.   Neurological:  Negative for dizziness, weakness, light-headedness, numbness and headaches.   Hematological:  Negative for adenopathy.   Psychiatric/Behavioral:  Negative for dysphoric mood, self-injury, sleep disturbance and suicidal ideas. The patient is not nervous/anxious.      Past Medical History   Past Medical History:   Diagnosis Date    Cardiomegaly     Diabetes mellitus (HCC)     H. pylori infection 04/24/2024    Pneumonia due to COVID-19 virus 05/06/2020    Status post cystoscopy with ureteral  "stent placement 06/05/2019     Past Surgical History:   Procedure Laterality Date    EYE SURGERY      FOR DECREASED VISION, PTERYGIUM    SD CYSTO/URETERO W/LITHOTRIPSY &INDWELL STENT INSRT Right 5/28/2019    Procedure: CYSTOSCOPY, URETEROSCOPY, STONE BASKET RETRIEVAL, AND INSERTION STENT URETERAL;  Surgeon: Javed Barraza MD;  Location: [facility];  Service: Urology     Family History   Problem Relation Age of Onset    Diabetes Mother     Kidney disease Mother     Other Mother         UTERINE CYST    No Known Problems Father      Current Outpatient Medications on File Prior to Visit   Medication Sig Dispense Refill    Empagliflozin-linaGLIPtin (Glyxambi) 10-5 MG TABS Take 1 tablet by mouth in the morning       No current facility-administered medications on file prior to visit.     Allergies   Allergen Reactions    Fish Allergy - Food Allergy Anaphylaxis     Tilapia-facial swelling    Other      Annotation - [id number]: tilapia fish - swelling of throat/eyes      Current Outpatient Medications on File Prior to Visit   Medication Sig Dispense Refill    Empagliflozin-linaGLIPtin (Glyxambi) 10-5 MG TABS Take 1 tablet by mouth in the morning       No current facility-administered medications on file prior to visit.      Social History     Tobacco Use    Smoking status: Former    Smokeless tobacco: Former    Tobacco comments:     pt \"quit 5 years ago\"   Vaping Use    Vaping status: Never Used   Substance and Sexual Activity    Alcohol use: Not Currently    Drug use: Never    Sexual activity: Not Currently     Comment: SEXUAL ABSTINENCE       Objective     /71 (BP Location: Left arm, Patient Position: Sitting, Cuff Size: Standard)   Pulse 58   Temp 98.5 °F (36.9 °C) (Temporal)   Ht 5' 5\" (1.651 m)   Wt 75.9 kg (167 lb 5 oz)   SpO2 99%   BMI 27.84 kg/m²     Physical Exam  Vitals and nursing note reviewed.   Constitutional:       General: He is awake. He is not in acute distress.     Appearance: Normal appearance. " He is well-developed, well-groomed and overweight. He is not ill-appearing.   HENT:      Head: Normocephalic and atraumatic.      Right Ear: Tympanic membrane, ear canal and external ear normal.      Left Ear: Tympanic membrane, ear canal and external ear normal.      Nose: Nose normal.      Mouth/Throat:      Mouth: Mucous membranes are moist.      Pharynx: Oropharynx is clear. Uvula midline. No oropharyngeal exudate or posterior oropharyngeal erythema.   Eyes:      General: No scleral icterus.     Extraocular Movements: Extraocular movements intact.      Conjunctiva/sclera: Conjunctivae normal.      Pupils: Pupils are equal, round, and reactive to light.   Neck:      Vascular: No carotid bruit, hepatojugular reflux or JVD.   Cardiovascular:      Rate and Rhythm: Normal rate and regular rhythm.      Pulses: Normal pulses. no weak pulses.           Radial pulses are 2+ on the right side and 2+ on the left side.        Dorsalis pedis pulses are 2+ on the right side and 2+ on the left side.        Posterior tibial pulses are 2+ on the right side and 2+ on the left side.      Heart sounds: Normal heart sounds. No murmur heard.  Pulmonary:      Effort: Pulmonary effort is normal. No respiratory distress.      Breath sounds: Normal breath sounds and air entry. No decreased air movement. No decreased breath sounds, wheezing, rhonchi or rales.   Abdominal:      General: Abdomen is flat. Bowel sounds are normal. There is no distension.      Palpations: Abdomen is soft.      Tenderness: There is no abdominal tenderness. There is no right CVA tenderness, left CVA tenderness, guarding or rebound.      Hernia: No hernia is present.   Musculoskeletal:         General: Normal range of motion.      Cervical back: Neck supple.      Right lower leg: No edema.      Left lower leg: No edema.        Feet:    Feet:      Right foot:      Skin integrity: No ulcer, skin breakdown, erythema, warmth, callus or dry skin.      Left foot:       Skin integrity: No ulcer, skin breakdown, erythema, warmth, callus or dry skin.   Lymphadenopathy:      Cervical: No cervical adenopathy.   Skin:     General: Skin is warm.      Coloration: Skin is not jaundiced.      Findings: No rash.   Neurological:      General: No focal deficit present.      Mental Status: He is alert and oriented to person, place, and time. Mental status is at baseline.      Motor: Motor function is intact.      Coordination: Coordination is intact.      Gait: Gait is intact.   Psychiatric:         Attention and Perception: Attention normal.         Mood and Affect: Mood and affect normal.         Speech: Speech normal.         Behavior: Behavior normal. Behavior is cooperative.     Patient's shoes and socks removed.    Right Foot/Ankle   Right Foot Inspection  Skin Exam: skin normal and skin intact. No dry skin, no warmth, no callus, no erythema, no maceration, no abnormal color, no pre-ulcer, no ulcer and no callus.     Toe Exam: ROM and strength within normal limits.     Sensory   Proprioception: intact  Monofilament testing: intact    Vascular  Capillary refills: < 3 seconds  The right DP pulse is 2+. The right PT pulse is 2+.     Left Foot/Ankle  Left Foot Inspection  Skin Exam: skin normal and skin intact. No dry skin, no warmth, no erythema, no maceration, normal color, no pre-ulcer, no ulcer and no callus.     Toe Exam: ROM and strength within normal limits.     Sensory   Proprioception: intact  Monofilament testing: intact    Vascular  Capillary refills: < 3 seconds  The left DP pulse is 2+. The left PT pulse is 2+.     Assign Risk Category  No deformity present  No loss of protective sensation  No weak pulses  Risk: 0         Administrative Statements   I have spent a total time of 40 minutes in caring for this patient on the day of the visit/encounter including Diagnostic results, Prognosis, Risks and benefits of tx options, Instructions for management, Patient and family education,  Importance of tx compliance, Risk factor reductions, Impressions, Counseling / Coordination of care, Reviewing / ordering tests, medicine, procedures  , and Obtaining or reviewing history  .

## 2024-08-28 ENCOUNTER — TELEPHONE (OUTPATIENT)
Dept: INTERNAL MEDICINE CLINIC | Facility: CLINIC | Age: 44
End: 2024-08-28

## 2024-08-28 NOTE — ASSESSMENT & PLAN NOTE
Discussed general health recommendations and screening guidelines. Recommend routine dental and eye exams. Due for screening labs. Hepatitis B immunization given. Tolerated well. Up to date on all other recommended immunizations. Next physical one year.

## 2024-08-28 NOTE — ASSESSMENT & PLAN NOTE
Lab Results   Component Value Date    HGBA1C 9.7 (A) 08/21/2024     Slight improvement from 9.8%. Goal A1c less than 7.0%. Has been out of medication for up to a month. Patient frequently runs out of medications. Discussed that before he runs out, he needs to call the pharmacy for a refill. If there is no refill the pharmacy or the patient can contact the office.  Restart metformin 1000 mg BID. Continue Glyxambi 10-5 mg daily. Reviewed nutrition and exercise recommendations. Foot and eye exam done today. Urine micro due today. Will recheck A1c in 3 months, return sooner if needed.

## 2024-08-28 NOTE — TELEPHONE ENCOUNTER
This patient was having difficulty with getting his Glyxambi refills. Can you touch base with him to go over it again? Thank you

## 2024-08-28 NOTE — ASSESSMENT & PLAN NOTE
Lab Results   Component Value Date    WBC 7.87 08/02/2023    HGB 13.8 08/02/2023    HCT 41.0 08/02/2023    MCV 84 08/02/2023     08/02/2023      Lab Results   Component Value Date    IRON 54 (L) 08/02/2023    TIBC 494 (H) 08/02/2023    FERRITIN 16 (L) 08/02/2023      Due for repeat iron panel and CBC. Continue ferrous sulfate every other day.

## 2024-08-28 NOTE — ASSESSMENT & PLAN NOTE
Lab Results   Component Value Date    CHOLESTEROL 171 08/02/2023    CHOLESTEROL 138 09/16/2022    CHOLESTEROL 157 10/11/2017     Lab Results   Component Value Date    HDL 47 08/02/2023    HDL 43 09/16/2022    HDL 40 10/11/2017     Lab Results   Component Value Date    TRIG 208 (H) 08/02/2023    TRIG 196 (H) 09/16/2022    TRIG 146 10/11/2017     Lab Results   Component Value Date    NONHDLC 124 08/02/2023    NONHDLC 95 09/16/2022      Lab Results   Component Value Date    LDLCALC 82 08/02/2023      Due for repeat lipid panel. Continue atorvastatin 20 mg daily. Low fat diet.

## 2024-08-30 ENCOUNTER — PATIENT OUTREACH (OUTPATIENT)
Dept: INTERNAL MEDICINE CLINIC | Facility: CLINIC | Age: 44
End: 2024-08-30

## 2024-08-30 NOTE — PROGRESS NOTES
Medication Patient Assistance Program (MPAP) Specialist  received personal reminder regarding patient's refill from [facility] patient assistance program for Glyxambi. MPAP specialist received In Basket (IB) message from clinician. Per IB message, patient is unclear how to receive refills. MPAP specialist reviewed patient chart prior to outreach.  MPAP specialist reached out to [facility] regarding patient's Glyxambi refill. MPAP specialist spoke with [facility] representative  Carole. Per [facility] rep, patient has not requested Glyxambi refill. MPAP specialist requested refill. Per [facility] rep patient will receive refill in 7-10 business days. Refill may be delayed by a day due to Holiday (09/02/2024). MPAP specialist confirmed refill process. MPAP specialist thanked [facility] rep.   MPAP specialist reached out to patient via [email or web address]  ID # [id number]. MPAP specialist successfully reached patient. MPAP specialist introduced self and explained she was calling regarding patient's Glyxambi refill. Patient confirmed he had not called in refill. MPAP specialist explained she had requested refill from [facility]. MPAP specialist explained patient should call [facility]  to request refills when he has 2 weeks of medication left. MPAP specialist provided patient [facility] number [phone]. MPAP specialist explained patient will need to press #8 for [place]. Patient verbalized understanding. MPAP specialist provided patient her contact information and encouraged patient to call if he has any question about his Glyxambi refills or shipping.Patient stated he would. MPAP specialist has scheduled outreach for 3 months to confirm next refill is placed.

## 2024-11-21 ENCOUNTER — OFFICE VISIT (OUTPATIENT)
Dept: INTERNAL MEDICINE CLINIC | Facility: CLINIC | Age: 44
End: 2024-11-21

## 2024-11-21 ENCOUNTER — PATIENT OUTREACH (OUTPATIENT)
Dept: INTERNAL MEDICINE CLINIC | Facility: CLINIC | Age: 44
End: 2024-11-21

## 2024-11-21 VITALS
DIASTOLIC BLOOD PRESSURE: 78 MMHG | HEIGHT: 65 IN | WEIGHT: 165 LBS | BODY MASS INDEX: 27.49 KG/M2 | TEMPERATURE: 97.2 F | SYSTOLIC BLOOD PRESSURE: 122 MMHG | HEART RATE: 69 BPM

## 2024-11-21 DIAGNOSIS — R80.9 MICROALBUMINURIA: ICD-10-CM

## 2024-11-21 DIAGNOSIS — E11.9 TYPE 2 DIABETES MELLITUS WITHOUT COMPLICATION, WITHOUT LONG-TERM CURRENT USE OF INSULIN (HCC): Primary | ICD-10-CM

## 2024-11-21 DIAGNOSIS — Z23 ENCOUNTER FOR IMMUNIZATION: ICD-10-CM

## 2024-11-21 DIAGNOSIS — E61.1 LOW SERUM IRON: ICD-10-CM

## 2024-11-21 DIAGNOSIS — Z78.9 NEEDS ASSISTANCE WITH COMMUNITY RESOURCES: Primary | ICD-10-CM

## 2024-11-21 DIAGNOSIS — E78.2 MIXED HYPERLIPIDEMIA: ICD-10-CM

## 2024-11-21 DIAGNOSIS — K21.9 GASTROESOPHAGEAL REFLUX DISEASE WITHOUT ESOPHAGITIS: ICD-10-CM

## 2024-11-21 DIAGNOSIS — E66.3 OVERWEIGHT WITH BODY MASS INDEX (BMI) OF 27 TO 27.9 IN ADULT: ICD-10-CM

## 2024-11-21 PROBLEM — Z00.00 ANNUAL PHYSICAL EXAM: Status: RESOLVED | Noted: 2023-08-02 | Resolved: 2024-11-21

## 2024-11-21 LAB — SL AMB POCT HEMOGLOBIN AIC: 11.8 (ref ?–6.5)

## 2024-11-21 PROCEDURE — 99214 OFFICE O/P EST MOD 30 MIN: CPT | Performed by: PHYSICIAN ASSISTANT

## 2024-11-21 PROCEDURE — 83036 HEMOGLOBIN GLYCOSYLATED A1C: CPT | Performed by: PHYSICIAN ASSISTANT

## 2024-11-21 PROCEDURE — 90471 IMMUNIZATION ADMIN: CPT | Performed by: PHYSICIAN ASSISTANT

## 2024-11-21 PROCEDURE — 90673 RIV3 VACCINE NO PRESERV IM: CPT | Performed by: PHYSICIAN ASSISTANT

## 2024-11-21 RX ORDER — EMPAGLIFLOZIN AND LINAGLIPTIN 10; 5 MG/1; MG/1
1 TABLET, FILM COATED ORAL DAILY
Qty: 90 TABLET | Refills: 3 | Status: SHIPPED | OUTPATIENT
Start: 2024-11-21

## 2024-11-21 NOTE — PROGRESS NOTES
SW has met with pt as per PCP request as pt has been having difficulty obtaining his medications.    Pt is known to SW from previous Interventions.  SW has spoken to pt via    ID # [id number].  Pt is originally form [facility].     He is not eligible for ongoing MA / FOREST.    Pt has been enrolled in our [facility] Assistance Programs.  Pt to go to our [place] [employer] [facility] after his appointment and re-apply,  Sw has also reviewed with him the Hospital Assistance program as has provided him the # to call.    SW has also placed a SECURE EMAIL to both Financial counselors Teams to ask they assist pt with same.     Pt has been unable to work recently and has not been getting his medications .    This has been an issue in the past as well.    Pt is being assisted by Ninoska Killian of the MPAP Program and Provider has given pt the Info to call ans re-order his Glyxambi.    His other medication are not too expensive but was not getting them due to not working.    He is returning now and feels he can get them. Sw has provided him info and cards for the GOOD [facility] Program.    Pt shares he resided he resides with his  brother who helps with housing and food.    He was aslo open to info and referral to local FOOD adame.    SW has reviewed the FIND Help application available on MY CHART  and with pt's  permission has made referrals  [facility] as well as [place] and has provided written info as well.    In addition, SW has the [employer] PROGRAM  which assist Immigrants with community  resources. SW has provided contact info re same.     Pt shares  he walks or used the bus to get to work or appointments.     Patient does not have any further questions, concerns, or other needs at this time.  Patient has my contact # and PCP office # if needed.  Social Work to remain available to assist as indicated.    Please re-consult Social Work if needed.

## 2024-11-21 NOTE — PROGRESS NOTES
Name: Xu Long      : 1980      MRN: [id number]  Encounter Provider: Sandra Bui PA-C  Encounter Date: 2024   Encounter department: [facility] [place]  :  Assessment & Plan  Type 2 diabetes mellitus without complication, without long-term current use of insulin (HCC)    Lab Results   Component Value Date    HGBA1C 11.8 (A) 2024     A1c increased from 9.7%. Goal A1c less than 7.0%. Has been out of medications. Patient frequently runs out of medications. Discussed that before he runs out, he needs to call the pharmacy for a refill. If there is no refill the pharmacy or the patient can contact the office.  Restart metformin 1000 mg BID. Restart Glyxambi 10-5 mg daily. Also had patient meet with AMBREEN Asif, to ensure he is able to afford his medications. Reviewed nutrition and exercise recommendations. Foot and eye exam up to date. Urine micro due today, ordered previous visit as well and blood work. Reminded patient to schedule. Will recheck A1c in 3 months, return sooner if needed.     Orders:    POCT hemoglobin A1c    Empagliflozin-linaGLIPtin (Glyxambi) 10-5 MG TABS; Take 1 tablet by mouth in the morning    Mixed hyperlipidemia  Lab Results   Component Value Date    CHOLESTEROL 171 2023    CHOLESTEROL 138 2022    CHOLESTEROL 157 10/11/2017     Lab Results   Component Value Date    HDL 47 2023    HDL 43 2022    HDL 40 10/11/2017     Lab Results   Component Value Date    TRIG 208 (H) 2023    TRIG 196 (H) 2022    TRIG 146 10/11/2017     Lab Results   Component Value Date    NONHDLC 124 2023    NONHDLC 95 2022      Lab Results   Component Value Date    LDLCALC 82 2023      Due for recheck, ordered at previous visit. Continue atorvastatin 20 mg once daily.        Microalbuminuria  Due to repeat urine micro, ordered previuosly. Continue lisinopril 2.5 mg daily.        Low serum iron  Due to recheck CBC  and iron panel. Ordered at previous visit, reminded him to schedule.        Gastroesophageal reflux disease without esophagitis  Denies any symptoms at this time. Reviewed anti-GERD diet. Will continue to monitor.       Encounter for immunization  Influenza immunization given. Tolerated well.   Orders:    influenza vaccine, recombinant, PF, 0.5 mL IM (Flublok)    Overweight with body mass index (BMI) of 27 to 27.9 in adult                History of Present Illness     Patient is a 44 year old male with a PMH of chronic back pain, T2DM, microalbuminuria, GERD, and HLD presenting for follow up. He states he has been out of his medications. Denies any medication side effects. He is not currently checking his sugar, but he denies any hypoglycemic symptoms. He states overall he is feeling well. His chronic back pain has been controlled with use of diclofenac gel. He has a history of GERD. States his overall symptoms are improved, but has occasional reflux or indigestion. Usually food induced. He has no complaints today.   Use of  services was utilized during visit.       Review of Systems   Constitutional:  Negative for appetite change, chills, diaphoresis, fatigue, fever and unexpected weight change.   HENT:  Negative for congestion, hearing loss, sore throat, tinnitus and trouble swallowing.    Eyes:  Negative for visual disturbance.   Respiratory:  Negative for cough, chest tightness, shortness of breath and wheezing.    Cardiovascular:  Negative for chest pain, palpitations and leg swelling.   Gastrointestinal:  Negative for abdominal pain, blood in stool, constipation, diarrhea, nausea and vomiting.   Endocrine: Negative for cold intolerance, heat intolerance, polydipsia, polyphagia and polyuria.   Genitourinary:  Negative for decreased urine volume, difficulty urinating, dysuria, flank pain, frequency, hematuria, testicular pain and urgency.   Musculoskeletal:  Negative for arthralgias and myalgias.    Skin:  Negative for rash.   Neurological:  Negative for dizziness, weakness, light-headedness, numbness and headaches.   Hematological:  Negative for adenopathy.   Psychiatric/Behavioral:  Negative for dysphoric mood, self-injury, sleep disturbance and suicidal ideas. The patient is not nervous/anxious.      Past Medical History   Past Medical History:   Diagnosis Date    Cardiomegaly     Diabetes mellitus (HCC)     H. pylori infection 04/24/2024    Pneumonia due to COVID-19 virus 05/06/2020    Status post cystoscopy with ureteral stent placement 06/05/2019     Past Surgical History:   Procedure Laterality Date    EYE SURGERY      FOR DECREASED VISION, PTERYGIUM    NC CYSTO/URETERO W/LITHOTRIPSY &INDWELL STENT INSRT Right 5/28/2019    Procedure: CYSTOSCOPY, URETEROSCOPY, STONE BASKET RETRIEVAL, AND INSERTION STENT URETERAL;  Surgeon: Javed Barraza MD;  Location: [facility];  Service: Urology     Family History   Problem Relation Age of Onset    Diabetes Mother     Kidney disease Mother     Other Mother         UTERINE CYST    No Known Problems Father       reports that he has quit smoking. He has quit using smokeless tobacco. He reports that he does not currently use alcohol. He reports that he does not use drugs.  Current Outpatient Medications on File Prior to Visit   Medication Sig Dispense Refill    atorvastatin (LIPITOR) 20 mg tablet Take 1 tablet (20 mg total) by mouth daily 90 tablet 2    ferrous sulfate 324 (65 Fe) mg Take 1 tablet (324 mg total) by mouth every other day 45 tablet 2    lisinopril (ZESTRIL) 2.5 mg tablet Take 1 tablet (2.5 mg total) by mouth daily 90 tablet 2    metFORMIN (GLUCOPHAGE) 1000 MG tablet Take 1 tablet (1,000 mg total) by mouth 2 (two) times a day with meals 180 tablet 3     No current facility-administered medications on file prior to visit.     Allergies   Allergen Reactions    Fish Allergy - Food Allergy Anaphylaxis     Tilapia-facial swelling    Other      Annotation -  "[id number]: tilapia fish - swelling of throat/eyes      Current Outpatient Medications on File Prior to Visit   Medication Sig Dispense Refill    atorvastatin (LIPITOR) 20 mg tablet Take 1 tablet (20 mg total) by mouth daily 90 tablet 2    ferrous sulfate 324 (65 Fe) mg Take 1 tablet (324 mg total) by mouth every other day 45 tablet 2    lisinopril (ZESTRIL) 2.5 mg tablet Take 1 tablet (2.5 mg total) by mouth daily 90 tablet 2    metFORMIN (GLUCOPHAGE) 1000 MG tablet Take 1 tablet (1,000 mg total) by mouth 2 (two) times a day with meals 180 tablet 3     No current facility-administered medications on file prior to visit.      Social History     Tobacco Use    Smoking status: Former    Smokeless tobacco: Former    Tobacco comments:     pt \"quit 5 years ago\"   Vaping Use    Vaping status: Never Used   Substance and Sexual Activity    Alcohol use: Not Currently    Drug use: Never    Sexual activity: Not Currently     Comment: SEXUAL ABSTINENCE        Objective   /78 (BP Location: Right arm, Patient Position: Sitting, Cuff Size: Large)   Pulse 69   Temp (!) 97.2 °F (36.2 °C) (Temporal)   Ht 5' 5\" (1.651 m)   Wt 74.8 kg (165 lb)   BMI 27.46 kg/m²      Physical Exam  Vitals and nursing note reviewed.   Constitutional:       General: He is awake. He is not in acute distress.     Appearance: Normal appearance. He is well-developed, well-groomed and overweight. He is not ill-appearing.   HENT:      Head: Normocephalic and atraumatic.      Right Ear: Tympanic membrane, ear canal and external ear normal.      Left Ear: Tympanic membrane, ear canal and external ear normal.      Nose: Nose normal.      Mouth/Throat:      Mouth: Mucous membranes are moist.      Pharynx: Oropharynx is clear. No oropharyngeal exudate or posterior oropharyngeal erythema.   Eyes:      General: No scleral icterus.     Extraocular Movements: Extraocular movements intact.      Conjunctiva/sclera: Conjunctivae normal.      Pupils: Pupils are " equal, round, and reactive to light.   Neck:      Vascular: No carotid bruit, hepatojugular reflux or JVD.   Cardiovascular:      Rate and Rhythm: Normal rate and regular rhythm.      Pulses: Normal pulses.           Radial pulses are 2+ on the right side and 2+ on the left side.      Heart sounds: Normal heart sounds. No murmur heard.  Pulmonary:      Effort: Pulmonary effort is normal. No respiratory distress.      Breath sounds: Normal breath sounds and air entry. No decreased air movement. No decreased breath sounds, wheezing, rhonchi or rales.   Abdominal:      General: Abdomen is flat. Bowel sounds are normal. There is no distension.      Palpations: Abdomen is soft.      Tenderness: There is no abdominal tenderness. There is no right CVA tenderness, left CVA tenderness, guarding or rebound.      Hernia: No hernia is present.   Musculoskeletal:         General: Normal range of motion.      Cervical back: Neck supple.      Right lower leg: No edema.      Left lower leg: No edema.   Lymphadenopathy:      Cervical: No cervical adenopathy.   Skin:     General: Skin is warm.      Coloration: Skin is not jaundiced.      Findings: No rash.   Neurological:      General: No focal deficit present.      Mental Status: He is alert and oriented to person, place, and time. Mental status is at baseline.      Motor: Motor function is intact.      Coordination: Coordination is intact.      Gait: Gait is intact.   Psychiatric:         Attention and Perception: Attention normal.         Mood and Affect: Mood and affect normal.         Speech: Speech normal.         Behavior: Behavior normal. Behavior is cooperative.       Administrative Statements   I have spent a total time of 30 minutes in caring for this patient on the day of the visit/encounter including Diagnostic results, Prognosis, Risks and benefits of tx options, Instructions for management, Patient and family education, Importance of tx compliance, Risk factor  reductions, Impressions, Counseling / Coordination of care, Reviewing / ordering tests, medicine, procedures  , and Obtaining or reviewing history  . Topics discussed with the patient / family include symptom assessment and management, medication review, goals of care, and anticipatory guidance.

## 2024-11-21 NOTE — ASSESSMENT & PLAN NOTE
Lab Results   Component Value Date    HGBA1C 11.8 (A) 11/21/2024     A1c increased from 9.7%. Goal A1c less than 7.0%. Has been out of medications. Patient frequently runs out of medications. Discussed that before he runs out, he needs to call the pharmacy for a refill. If there is no refill the pharmacy or the patient can contact the office.  Restart metformin 1000 mg BID. Restart Glyxambi 10-5 mg daily. Also had patient meet with AMBREEN Asif, to ensure he is able to afford his medications. Reviewed nutrition and exercise recommendations. Foot and eye exam up to date. Urine micro due today, ordered previous visit as well and blood work. Reminded patient to schedule. Will recheck A1c in 3 months, return sooner if needed.     Orders:    POCT hemoglobin A1c    Empagliflozin-linaGLIPtin (Glyxambi) 10-5 MG TABS; Take 1 tablet by mouth in the morning

## 2024-11-21 NOTE — PROGRESS NOTES
SW has met with pt as per PCP request as [t has been having difficulty obtaining his medications.  Pt is known to SW from previous Interventions.  SW has spoken to pt via  ID # [id number].  Pt is originally form [facility].   He is not eligible for ongoing MA / FOREST.  Pt has been enrolled in our [facility] Assistance Programs.  Pt to go there after his appointment and re-apply,  Sw has also reviewed wit him the Hospital Assistance program as has provided him the # to call.  Pt has been unable to work recently and has not been getting his medications .  This has been an issue ibthe past as well.  Pt is being assisted by melani Killian of the MPAP Program and Provider has given pt the Info to call ans re-order his Glyxambi.  His other medication are not too expensive but was not getting them due to not working.  He is returning now asn feels he can get them. Ambreen has provided him info asn cards for  [employer] RX Program.  Pt shares he resided he resifdes tristen lemus whp helps withhousing asn food.  He ws aslo open to info asn refereal to local FOOD banlks AMBREEN has reviewed the FIND Helpappplicationasn withhis permissioonmade referral an provide xwritten info as well.

## 2024-11-21 NOTE — ASSESSMENT & PLAN NOTE
Due to repeat urine micro, ordered previuosly. Continue lisinopril 2.5 mg daily.         ,[email or web address]

## 2024-11-29 ENCOUNTER — PATIENT OUTREACH (OUTPATIENT)
Dept: INTERNAL MEDICINE CLINIC | Facility: CLINIC | Age: 44
End: 2024-11-29

## 2024-11-29 NOTE — PROGRESS NOTES
Medication Patient Assistance Program (MPAP) Specialist received personal reminder regarding patient's Glyxambi refill from [facility]. MPAP specialist reviewed patient chart prior to outreach. Per chart review patient's Glyxambi is available for refill request. MPAP specialist attempted to reach [facility] to determine refill status. Per [facility] automated message, [facility] is closed for the holiday. MPAP specialist will follow up with  cares next week to determine refill status. MPAP specialist has placed a personal reminder.

## 2024-12-03 ENCOUNTER — PATIENT OUTREACH (OUTPATIENT)
Dept: INTERNAL MEDICINE CLINIC | Facility: CLINIC | Age: 44
End: 2024-12-03

## 2024-12-03 NOTE — PROGRESS NOTES
Medication Patient Assistance Program (MPAP) Specialist  received personal reminder notification regarding patient's refill of Glyxambi through [facility] patient assistance program.MPAP specialist reviewed patient chart prior to outreach. MPAP specialist spoke with [facility] representative Jessica. Per [facility], patient is approved for program until 05/20/2025. Per [facility] patient may reapply  in April for program. [facility] rep stated patient's refill has not been requested. MPAP specialist requested patient's 90 day refill of Glyxambi. [facility] stated patient shipment should arrive within 7-10 business days. Per [facility] rep patient has 2 refills remaining. [facility]. MPAP specialist confirmed patient will receive auto refills starting with his next shipment. MPAP specialist thanked [facility] cares rep. MPAP specialist has placed a personal reminder regarding patient's next shipment.

## 2025-02-14 ENCOUNTER — APPOINTMENT (OUTPATIENT)
Dept: LAB | Facility: CLINIC | Age: 45
End: 2025-02-14

## 2025-02-14 ENCOUNTER — OFFICE VISIT (OUTPATIENT)
Dept: INTERNAL MEDICINE CLINIC | Facility: CLINIC | Age: 45
End: 2025-02-14

## 2025-02-14 VITALS
HEART RATE: 65 BPM | SYSTOLIC BLOOD PRESSURE: 111 MMHG | OXYGEN SATURATION: 98 % | TEMPERATURE: 97.9 F | HEIGHT: 65 IN | BODY MASS INDEX: 27.59 KG/M2 | DIASTOLIC BLOOD PRESSURE: 69 MMHG | WEIGHT: 165.6 LBS | RESPIRATION RATE: 16 BRPM

## 2025-02-14 DIAGNOSIS — E11.9 TYPE 2 DIABETES MELLITUS WITHOUT COMPLICATION, WITHOUT LONG-TERM CURRENT USE OF INSULIN (HCC): ICD-10-CM

## 2025-02-14 DIAGNOSIS — R80.9 MICROALBUMINURIA: ICD-10-CM

## 2025-02-14 DIAGNOSIS — E78.2 MIXED HYPERLIPIDEMIA: ICD-10-CM

## 2025-02-14 DIAGNOSIS — M54.50 ACUTE BILATERAL LOW BACK PAIN WITHOUT SCIATICA: Primary | ICD-10-CM

## 2025-02-14 DIAGNOSIS — E61.1 LOW SERUM IRON: ICD-10-CM

## 2025-02-14 LAB
EST. AVERAGE GLUCOSE BLD GHB EST-MCNC: 217 MG/DL
HBA1C MFR BLD: 9.2 %

## 2025-02-14 PROCEDURE — 83036 HEMOGLOBIN GLYCOSYLATED A1C: CPT

## 2025-02-14 PROCEDURE — 99214 OFFICE O/P EST MOD 30 MIN: CPT | Performed by: PHYSICIAN ASSISTANT

## 2025-02-14 PROCEDURE — 36415 COLL VENOUS BLD VENIPUNCTURE: CPT

## 2025-02-14 RX ORDER — METHOCARBAMOL 750 MG/1
750 TABLET, FILM COATED ORAL EVERY 6 HOURS PRN
Qty: 60 TABLET | Refills: 2 | Status: SHIPPED | OUTPATIENT
Start: 2025-02-14

## 2025-02-14 RX ORDER — NAPROXEN 500 MG/1
500 TABLET ORAL 2 TIMES DAILY PRN
Qty: 60 TABLET | Refills: 2 | Status: SHIPPED | OUTPATIENT
Start: 2025-02-14

## 2025-02-14 NOTE — ASSESSMENT & PLAN NOTE
Lab Results   Component Value Date    HGBA1C 11.8 (A) 11/21/2024     Goal A1c less than 7.0%. Has been non-adherent with medications. Patient frequently runs out of medications. Discussed that before he runs out, he needs to call the pharmacy for a refill. If there is no refill the pharmacy or the patient can contact the office.  Restart metformin 1000 mg BID. Restart Glyxambi 10-5 mg daily. Also had patient meet with AMBREEN Asif, to ensure he is able to afford his medications. Reviewed nutrition and exercise recommendations. Foot and eye exam up to date. Labs ordered at previous visit. Will add A1c to orders, reminded patient to complete. Will recheck A1c in 3 months, return sooner if needed.   Orders:    Hemoglobin A1C; Future

## 2025-02-14 NOTE — ASSESSMENT & PLAN NOTE
Likely musculoskeletal. Declines imaging today. Recommend supportive treatment. Warm/cool compresses. Stretch throughout the day. Use good body mechanics. Hand out given with demonstrations. Tylenol as needed. Naproxen 500 mg BID as needed with food. Methocarbamol 750 mg every 6 hours as needed. Discussed possible side effects. Do not drive if taking. Return to care if symptoms worsen or fail to improve.   Orders:    naproxen (NAPROSYN) 500 mg tablet; Take 1 tablet (500 mg total) by mouth 2 (two) times a day as needed for moderate pain (with meals)    methocarbamol (Robaxin-750) 750 mg tablet; Take 1 tablet (750 mg total) by mouth every 6 (six) hours as needed for muscle spasms (back pain (may cause drowsiness))

## 2025-02-14 NOTE — ASSESSMENT & PLAN NOTE
Due to recheck CBC and iron panel. Ordered at previous visit, reminded him to schedule. Continue ferrous sulfate 324 mg every other day.

## 2025-02-14 NOTE — ASSESSMENT & PLAN NOTE
Lab Results   Component Value Date    CHOLESTEROL 171 08/02/2023    CHOLESTEROL 138 09/16/2022    CHOLESTEROL 157 10/11/2017     Lab Results   Component Value Date    HDL 47 08/02/2023    HDL 43 09/16/2022    HDL 40 10/11/2017     Lab Results   Component Value Date    TRIG 208 (H) 08/02/2023    TRIG 196 (H) 09/16/2022    TRIG 146 10/11/2017     Lab Results   Component Value Date    NONHDLC 124 08/02/2023    NONHDLC 95 09/16/2022      Lab Results   Component Value Date    LDLCALC 82 08/02/2023      Due for recheck, ordered at previous visit. Continue atorvastatin 20 mg once daily.

## 2025-02-14 NOTE — PROGRESS NOTES
Name: Xu Long      : 1980      MRN: [id number]  Encounter Provider: Sandra Charles PA-C  Encounter Date: 2025   Encounter department: [facility] [place]  :  Assessment & Plan  Acute bilateral low back pain without sciatica  Likely musculoskeletal. Declines imaging today. Recommend supportive treatment. Warm/cool compresses. Stretch throughout the day. Use good body mechanics. Hand out given with demonstrations. Tylenol as needed. Naproxen 500 mg BID as needed with food. Methocarbamol 750 mg every 6 hours as needed. Discussed possible side effects. Do not drive if taking. Return to care if symptoms worsen or fail to improve.   Orders:    naproxen (NAPROSYN) 500 mg tablet; Take 1 tablet (500 mg total) by mouth 2 (two) times a day as needed for moderate pain (with meals)    methocarbamol (Robaxin-750) 750 mg tablet; Take 1 tablet (750 mg total) by mouth every 6 (six) hours as needed for muscle spasms (back pain (may cause drowsiness))    Type 2 diabetes mellitus without complication, without long-term current use of insulin ([facility])    Lab Results   Component Value Date    HGBA1C 11.8 (A) 2024     Goal A1c less than 7.0%. Has been non-adherent with medications. Patient frequently runs out of medications. Discussed that before he runs out, he needs to call the pharmacy for a refill. If there is no refill the pharmacy or the patient can contact the office.  Restart metformin 1000 mg BID. Restart Glyxambi 10-5 mg daily. Also had patient meet with AMBREEN Asif, to ensure he is able to afford his medications. Reviewed nutrition and exercise recommendations. Foot and eye exam up to date. Labs ordered at previous visit. Will add A1c to orders, reminded patient to complete. Will recheck A1c in 3 months, return sooner if needed.   Orders:    Hemoglobin A1C; Future    Mixed hyperlipidemia  Lab Results   Component Value Date    CHOLESTEROL 171 2023    CHOLESTEROL  138 09/16/2022    CHOLESTEROL 157 10/11/2017     Lab Results   Component Value Date    HDL 47 08/02/2023    HDL 43 09/16/2022    HDL 40 10/11/2017     Lab Results   Component Value Date    TRIG 208 (H) 08/02/2023    TRIG 196 (H) 09/16/2022    TRIG 146 10/11/2017     Lab Results   Component Value Date    NONHDLC 124 08/02/2023    NONHDLC 95 09/16/2022      Lab Results   Component Value Date    LDLCALC 82 08/02/2023      Due for recheck, ordered at previous visit. Continue atorvastatin 20 mg once daily.        Microalbuminuria  Due to repeat urine micro, ordered previuosly. Continue lisinopril 2.5 mg daily.        Low serum iron  Due to recheck CBC and iron panel. Ordered at previous visit, reminded him to schedule. Continue ferrous sulfate 324 mg every other day.           BMI Counseling: Body mass index is 27.56 kg/m². The BMI is above normal. Nutrition recommendations include decreasing portion sizes, encouraging healthy choices of fruits and vegetables, decreasing fast food intake, consuming healthier snacks, limiting drinks that contain sugar, moderation in carbohydrate intake, increasing intake of lean protein, reducing intake of saturated and trans fat and reducing intake of cholesterol. Exercise recommendations include moderate physical activity 150 minutes/week, exercising 3-5 times per week and strength training exercises. No pharmacotherapy was ordered. Rationale for BMI follow-up plan is due to patient being overweight or obese.     Depression Screening and Follow-up Plan: Patient was screened for depression during today's encounter. They screened negative with a PHQ-2 score of 0.        History of Present Illness   Patient is a 44 year old male with a PMH of chronic back pain, T2DM, microalbuminuria, GERD, and HLD presenting for follow up. States he has not been taking some of his medications. Denies any medication side effects. He is not currently checking his sugar, but he denies any hypoglycemic  "symptoms. He states overall he is feeling well. He is complaining of low back pain for 3 days. Started on the left, now both sides are painful. Denies radiation. Started after lifting heavy boxes at work. He can not describe the pain. Currently 7/10 pain. Denies numbness, tingling, or weakness. Denies saddle anesthesia and fecal/urinary incontinence. He tried a medication yesterday. Unsure of the name. Gave temporary relief.   Use of  services was utilized during visit.       Review of Systems   Constitutional:  Negative for appetite change, chills, diaphoresis, fatigue, fever and unexpected weight change.   HENT:  Negative for congestion, hearing loss, sore throat, tinnitus and trouble swallowing.    Eyes:  Negative for visual disturbance.   Respiratory:  Negative for cough, chest tightness, shortness of breath and wheezing.    Cardiovascular:  Negative for chest pain, palpitations and leg swelling.   Gastrointestinal:  Negative for abdominal pain, blood in stool, constipation, diarrhea, nausea and vomiting.   Endocrine: Negative for cold intolerance, heat intolerance, polydipsia, polyphagia and polyuria.   Genitourinary:  Negative for decreased urine volume, difficulty urinating, dysuria, flank pain, frequency, hematuria, testicular pain and urgency.   Musculoskeletal:  Positive for back pain. Negative for arthralgias, gait problem, joint swelling and myalgias.   Skin:  Negative for rash.   Neurological:  Negative for dizziness, weakness, light-headedness, numbness and headaches.   Hematological:  Negative for adenopathy.   Psychiatric/Behavioral:  Negative for dysphoric mood, self-injury, sleep disturbance and suicidal ideas. The patient is not nervous/anxious.        Objective   /69 (BP Location: Left arm, Patient Position: Sitting, Cuff Size: Large)   Pulse 65   Temp 97.9 °F (36.6 °C) (Temporal)   Resp 16   Ht 5' 5\" (1.651 m)   Wt 75.1 kg (165 lb 9.6 oz)   SpO2 98%   BMI 27.56 kg/m²    "   Physical Exam  Vitals and nursing note reviewed.   Constitutional:       General: He is awake. He is not in acute distress.     Appearance: Normal appearance. He is well-developed, well-groomed and overweight. He is not ill-appearing.   HENT:      Head: Normocephalic and atraumatic.      Right Ear: Tympanic membrane, ear canal and external ear normal.      Left Ear: Tympanic membrane, ear canal and external ear normal.      Nose: Nose normal.      Mouth/Throat:      Mouth: Mucous membranes are moist.      Pharynx: Oropharynx is clear. No oropharyngeal exudate or posterior oropharyngeal erythema.   Eyes:      General: No scleral icterus.     Extraocular Movements: Extraocular movements intact.      Conjunctiva/sclera: Conjunctivae normal.      Pupils: Pupils are equal, round, and reactive to light.   Neck:      Vascular: No carotid bruit, hepatojugular reflux or JVD.   Cardiovascular:      Rate and Rhythm: Normal rate and regular rhythm.      Pulses: Normal pulses.           Radial pulses are 2+ on the right side and 2+ on the left side.      Heart sounds: Normal heart sounds. No murmur heard.  Pulmonary:      Effort: Pulmonary effort is normal. No respiratory distress.      Breath sounds: Normal breath sounds and air entry. No decreased air movement. No decreased breath sounds, wheezing, rhonchi or rales.   Abdominal:      General: Abdomen is flat. Bowel sounds are normal. There is no distension.      Palpations: Abdomen is soft. There is no mass.      Tenderness: There is no abdominal tenderness. There is no guarding or rebound.      Hernia: No hernia is present.   Musculoskeletal:         General: Normal range of motion.      Cervical back: Neck supple.      Thoracic back: Normal.      Lumbar back: Tenderness (bilateral paraspinals) present. No swelling, edema, deformity, signs of trauma, lacerations, spasms or bony tenderness. Normal range of motion. Negative right straight leg raise test and negative left  straight leg raise test. No scoliosis.      Right lower leg: No edema.      Left lower leg: No edema.   Lymphadenopathy:      Cervical: No cervical adenopathy.   Skin:     General: Skin is warm.      Coloration: Skin is not jaundiced.      Findings: No rash.   Neurological:      General: No focal deficit present.      Mental Status: He is alert and oriented to person, place, and time. Mental status is at baseline.      Motor: Motor function is intact.      Coordination: Coordination is intact.      Gait: Gait is intact.   Psychiatric:         Attention and Perception: Attention normal.         Mood and Affect: Mood and affect normal.         Speech: Speech normal.         Behavior: Behavior normal. Behavior is cooperative.       Administrative Statements   I have spent a total time of 20 minutes in caring for this patient on the day of the visit/encounter including Diagnostic results, Prognosis, Risks and benefits of tx options, Instructions for management, Patient and family education, Importance of tx compliance, Risk factor reductions, Impressions, Counseling / Coordination of care, Reviewing / ordering tests, medicine, procedures  , and Obtaining or reviewing history  . Topics discussed with the patient / family include symptom assessment and management, medication review, goals of care, and supportive listening.

## 2025-02-17 ENCOUNTER — RESULTS FOLLOW-UP (OUTPATIENT)
Dept: INTERNAL MEDICINE CLINIC | Facility: CLINIC | Age: 45
End: 2025-02-17

## 2025-03-11 ENCOUNTER — PATIENT OUTREACH (OUTPATIENT)
Dept: INTERNAL MEDICINE CLINIC | Facility: CLINIC | Age: 45
End: 2025-03-11

## 2025-03-11 NOTE — PROGRESS NOTES
Medication Patient Assistance Program (MPAP) Specialist  received personal reminder regarding patient's Glyxambi refill. .MMPAP specialist successfully reached out to [facility] representative, Anabella. Per  cares rep patient's program will end on 05/28/2025. Patient's last refill was processed in December of 2024. [facility] rep placed request for refill. Patient's refill should arrive at patient's house within 7-10 business days. Patient has 1 refill remaining. MPAP specialist will follow up with [facility] if patient's shipment is not received. MPAP specialist has placed personal reminder.

## 2025-05-06 ENCOUNTER — APPOINTMENT (OUTPATIENT)
Dept: LAB | Facility: CLINIC | Age: 45
End: 2025-05-06

## 2025-05-06 ENCOUNTER — OFFICE VISIT (OUTPATIENT)
Dept: INTERNAL MEDICINE CLINIC | Facility: CLINIC | Age: 45
End: 2025-05-06

## 2025-05-06 VITALS
TEMPERATURE: 97.8 F | WEIGHT: 167 LBS | DIASTOLIC BLOOD PRESSURE: 72 MMHG | SYSTOLIC BLOOD PRESSURE: 113 MMHG | BODY MASS INDEX: 27.82 KG/M2 | HEART RATE: 62 BPM | HEIGHT: 65 IN

## 2025-05-06 DIAGNOSIS — R80.9 MICROALBUMINURIA: ICD-10-CM

## 2025-05-06 DIAGNOSIS — E61.1 LOW SERUM IRON: ICD-10-CM

## 2025-05-06 DIAGNOSIS — E78.2 MIXED HYPERLIPIDEMIA: ICD-10-CM

## 2025-05-06 DIAGNOSIS — E11.9 TYPE 2 DIABETES MELLITUS WITHOUT COMPLICATION, WITHOUT LONG-TERM CURRENT USE OF INSULIN (HCC): Primary | ICD-10-CM

## 2025-05-06 LAB — SL AMB POCT HEMOGLOBIN AIC: 10.1 (ref ?–6.5)

## 2025-05-06 RX ORDER — LISINOPRIL 2.5 MG/1
2.5 TABLET ORAL DAILY
Qty: 90 TABLET | Refills: 2 | Status: SHIPPED | OUTPATIENT
Start: 2025-05-06

## 2025-05-06 RX ORDER — ATORVASTATIN CALCIUM 20 MG/1
20 TABLET, FILM COATED ORAL DAILY
Qty: 90 TABLET | Refills: 2 | Status: SHIPPED | OUTPATIENT
Start: 2025-05-06

## 2025-05-06 NOTE — ASSESSMENT & PLAN NOTE
Continue low dose ace await labs   Orders:    lisinopril (ZESTRIL) 2.5 mg tablet; Take 1 tablet (2.5 mg total) by mouth daily

## 2025-05-06 NOTE — ASSESSMENT & PLAN NOTE
We reviewed low fat diet in detail , he will work on this , get more exercise and continue statin   Orders:    atorvastatin (LIPITOR) 20 mg tablet; Take 1 tablet (20 mg total) by mouth daily

## 2025-05-06 NOTE — PROGRESS NOTES
Name: Xu Long      : 1980      MRN: [id number]  Encounter Provider: Devi Srinivasan MD  Encounter Date: 2025   Enc[facility]    Assessment & Plan  Mixed hyperlipidemia  We reviewed low fat diet in detail , he will work on this , get more exercise and continue statin   Orders:    atorvastatin (LIPITOR) 20 mg tablet; Take 1 tablet (20 mg total) by mouth daily    Microalbuminuria  Continue low dose ace await labs   Orders:    lisinopril (ZESTRIL) 2.5 mg tablet; Take 1 tablet (2.5 mg total) by mouth daily    Low serum iron  Last check 2023 ferritin level 16 , due for follow up labs he will et these done        Type 2 diabetes mellitus without complication, without long-term current use of insulin (HCC)  Continue to follow diet watchful of carbohydrates and control portions . Patient is feeling well he has been taking glyxambi  x 2 months , hgba1c today value  10.1 ( last value 9.2) he has bene eating more rice not paying as good of attention to low carb diet , he will make start to be more strict with low carb diet , portion control and regular exercis ,continue current medications , diabetic eye exam and podiatry visits completed or are scheduled     Lab Results   Component Value Date    HGBA1C 10.1 (A) 2025       Orders:    metFORMIN (GLUCOPHAGE) 1000 MG tablet; Take 1 tablet (1,000 mg total) by mouth 2 (two) times a day with meals    POCT hemoglobin A1c    BMI Counseling: Body mass index is 27.79 kg/m². The BMI is above normal. Nutrition recommendations include decreasing portion sizes, encouraging healthy choices of fruits and vegetables, decreasing fast food intake, consuming healthier snacks, limiting drinks that contain sugar and reducing intake of saturated and trans fat. Exercise recommendations include exercising 3-5 times per week. Rationale for BMI follow-up plan is due to patient being overweight or obese.          History of Present Illness     HPIPatient here for follow up DM type 2 uncontrolled and hyperlipidemia , interpretor # [id number] present during office visit   Last hgba1c of 2/14 9.2 had been 11.8 11/2024 he had been out of meds restarted in November   Since last here he has been taking medications since last here , getting glyxambi through the office , he has been taking it x 2 months  he is due for fasting labs and urine microalbumin order has been in the system .  He has not been as careful with his diet , he has been eating more rice he works more outside [place] so is walking a lot more   Review of Systems   Constitutional:  Negative for chills and fever.   HENT:  Negative for ear pain and sore throat.    Eyes:  Negative for pain and visual disturbance.   Respiratory:  Negative for cough and shortness of breath.    Cardiovascular:  Negative for chest pain and palpitations.   Gastrointestinal:  Negative for abdominal pain and vomiting.   Genitourinary:  Negative for dysuria and hematuria.   Musculoskeletal:  Negative for arthralgias and back pain.   Skin:  Negative for color change and rash.   Neurological:  Negative for seizures and syncope.   All other systems reviewed and are negative.    Past Medical History:   Diagnosis Date    Cardiomegaly     Diabetes mellitus (HCC)     H. pylori infection 04/24/2024    Pneumonia due to COVID-19 virus 05/06/2020    Status post cystoscopy with ureteral stent placement 06/05/2019     Past Surgical History:   Procedure Laterality Date    EYE SURGERY      FOR DECREASED VISION, PTERYGIUM    DC CYSTO/URETERO W/LITHOTRIPSY &INDWELL STENT INSRT Right 5/28/2019    Procedure: CYSTOSCOPY, URETEROSCOPY, STONE BASKET RETRIEVAL, AND INSERTION STENT URETERAL;  Surgeon: Javed Barraza MD;  Location: [facility];  Service: Urology     Family History   Problem Relation Age of Onset    Diabetes Mother     Kidney disease Mother     Other Mother         UTERINE CYST    No Known Problems  "Father      Social History     Tobacco Use    Smoking status: Former    Smokeless tobacco: Former    Tobacco comments:     pt \"quit 5 years ago\"   Vaping Use    Vaping status: Never Used   Substance and Sexual Activity    Alcohol use: Not Currently    Drug use: Never    Sexual activity: Not Currently     Comment: SEXUAL ABSTINENCE     Current Outpatient Medications on File Prior to Visit   Medication Sig    Empagliflozin-linaGLIPtin (Glyxambi) 10-5 MG TABS Take 1 tablet by mouth in the morning    ferrous sulfate 324 (65 Fe) mg Take 1 tablet (324 mg total) by mouth every other day    methocarbamol (Robaxin-750) 750 mg tablet Take 1 tablet (750 mg total) by mouth every 6 (six) hours as needed for muscle spasms (back pain (may cause drowsiness))    naproxen (NAPROSYN) 500 mg tablet Take 1 tablet (500 mg total) by mouth 2 (two) times a day as needed for moderate pain (with meals)    [DISCONTINUED] atorvastatin (LIPITOR) 20 mg tablet Take 1 tablet (20 mg total) by mouth daily    [DISCONTINUED] lisinopril (ZESTRIL) 2.5 mg tablet Take 1 tablet (2.5 mg total) by mouth daily    [DISCONTINUED] metFORMIN (GLUCOPHAGE) 1000 MG tablet Take 1 tablet (1,000 mg total) by mouth 2 (two) times a day with meals     Allergies   Allergen Reactions    Fish Allergy - Food Allergy Anaphylaxis     Tilapia-facial swelling    Other      Annotation - [id number]: tilapia fish - swelling of throat/eyes     Immunization History   Administered Date(s) Administered    Hep B, adult 04/24/2024, 08/21/2024    Influenza Recombinant Preservative Free Im 11/21/2024    Influenza, recombinant, quadrivalent,injectable, preservative free 09/30/2020, 10/19/2021    Pneumococcal Conjugate Vaccine 20-valent (Pcv20), Polysace 06/08/2022    Pneumococcal Polysaccharide PPV23 01/13/2021    Tdap 01/13/2021     Objective   /72 (BP Location: Right arm, Patient Position: Sitting, Cuff Size: Large)   Pulse 62   Temp 97.8 °F (36.6 °C) (Temporal)   Ht 5' 5\" (1.651 m) "   Wt 75.8 kg (167 lb)   BMI 27.79 kg/m²     Physical Exam  Vitals and nursing note reviewed.   Constitutional:       General: He is not in acute distress.     Appearance: He is well-developed.      Comments: Skin with good color turgor , well hydrated ,no distress noted     HENT:      Head: Normocephalic and atraumatic.      Right Ear: No decreased hearing noted. No middle ear effusion. There is no impacted cerumen.      Left Ear: No decreased hearing noted.  No middle ear effusion. There is no impacted cerumen.      Mouth/Throat:      Pharynx: Oropharynx is clear.   Eyes:      Conjunctiva/sclera: Conjunctivae normal.   Neck:      Thyroid: No thyromegaly.   Cardiovascular:      Rate and Rhythm: Normal rate and regular rhythm.      Heart sounds: Normal heart sounds. No murmur heard.  Pulmonary:      Effort: Pulmonary effort is normal. No respiratory distress.      Breath sounds: Normal breath sounds.   Abdominal:      Palpations: Abdomen is soft.      Tenderness: There is no abdominal tenderness.   Musculoskeletal:         General: No swelling.      Cervical back: Neck supple.   Lymphadenopathy:      Cervical:      Right cervical: No superficial cervical adenopathy.     Left cervical: No superficial cervical adenopathy.   Skin:     General: Skin is warm and dry.      Capillary Refill: Capillary refill takes less than 2 seconds.   Neurological:      Mental Status: He is alert.      Comments: Non focal exam    Psychiatric:         Attention and Perception: Attention normal.         Mood and Affect: Mood normal.         Speech: Speech normal.         Behavior: Behavior normal.         Thought Content: Thought content normal.

## 2025-05-06 NOTE — ASSESSMENT & PLAN NOTE
Continue to follow diet watchful of carbohydrates and control portions . Patient is feeling well he has been taking glyxambi  x 2 months , hgba1c today value  10.1 ( last value 9.2) he has bene eating more rice not paying as good of attention to low carb diet , he will make start to be more strict with low carb diet , portion control and regular exercis ,continue current medications , diabetic eye exam and podiatry visits completed or are scheduled     Lab Results   Component Value Date    HGBA1C 10.1 (A) 05/06/2025       Orders:    metFORMIN (GLUCOPHAGE) 1000 MG tablet; Take 1 tablet (1,000 mg total) by mouth 2 (two) times a day with meals    POCT hemoglobin A1c

## 2025-07-21 ENCOUNTER — PATIENT OUTREACH (OUTPATIENT)
Dept: INTERNAL MEDICINE CLINIC | Facility: CLINIC | Age: 45
End: 2025-07-21

## 2025-07-21 NOTE — PROGRESS NOTES
Pharmaceutical Assistance Programs ( [facility]) received personal reminder notification.  has reviewed patient chart prior to outreach. Patient's patient assistance program for Glyxambi ended on 05/28/2025.  Pharmaceutical Assistance Programs is closing referral to Medication Patient Assistant Program at this time. has referred patient to clinician and clinical staff for further medication questions.

## (undated) DEVICE — GLOVE SRG BIOGEL 7

## (undated) DEVICE — GUIDEWIRE ANGLE TIP 0.038 IN SOLO PLUS

## (undated) DEVICE — BASKET SPECIMEN RETRIVAL 1.9FR 120CM

## (undated) DEVICE — 3M™ TEGADERM™ TRANSPARENT FILM DRESSING FRAME STYLE, 1624W, 2-3/8 IN X 2-3/4 IN (6 CM X 7 CM), 100/CT 4CT/CASE: Brand: 3M™ TEGADERM™

## (undated) DEVICE — CATH URETERAL 5FR X 70 CM FLEX TIP POLYUR BARD

## (undated) DEVICE — PACK TUR

## (undated) DEVICE — SYRINGE 10ML LL

## (undated) DEVICE — GUIDEWIRE STRGHT TIP 0.035 IN  SOLO PLUS

## (undated) DEVICE — SPECIMEN CONTAINER STERILE PEEL PACK